# Patient Record
Sex: FEMALE | Race: ASIAN | HISPANIC OR LATINO | Employment: FULL TIME | URBAN - METROPOLITAN AREA
[De-identification: names, ages, dates, MRNs, and addresses within clinical notes are randomized per-mention and may not be internally consistent; named-entity substitution may affect disease eponyms.]

---

## 2017-06-11 ENCOUNTER — TRANSCRIBE ORDERS (OUTPATIENT)
Dept: LAB | Facility: HOSPITAL | Age: 72
End: 2017-06-11

## 2017-06-11 ENCOUNTER — APPOINTMENT (OUTPATIENT)
Dept: LAB | Facility: HOSPITAL | Age: 72
End: 2017-06-11
Payer: COMMERCIAL

## 2017-06-11 DIAGNOSIS — J45.909 UNCOMPLICATED ASTHMA: ICD-10-CM

## 2017-06-11 DIAGNOSIS — R73.01 IMPAIRED FASTING GLUCOSE: ICD-10-CM

## 2017-06-11 DIAGNOSIS — E78.5 HYPERLIPIDEMIA: ICD-10-CM

## 2017-06-11 DIAGNOSIS — K21.9 GASTRO-ESOPHAGEAL REFLUX DISEASE WITHOUT ESOPHAGITIS: ICD-10-CM

## 2017-06-11 DIAGNOSIS — M85.80 OTHER SPECIFIED DISORDERS OF BONE DENSITY AND STRUCTURE, UNSPECIFIED SITE: ICD-10-CM

## 2017-06-11 LAB
ALBUMIN SERPL BCP-MCNC: 3.6 G/DL (ref 3.5–5)
ALP SERPL-CCNC: 104 U/L (ref 46–116)
ALT SERPL W P-5'-P-CCNC: 26 U/L (ref 12–78)
ANION GAP SERPL CALCULATED.3IONS-SCNC: 5 MMOL/L (ref 4–13)
AST SERPL W P-5'-P-CCNC: 20 U/L (ref 5–45)
BILIRUB SERPL-MCNC: 0.45 MG/DL (ref 0.2–1)
BUN SERPL-MCNC: 17 MG/DL (ref 5–25)
CALCIUM SERPL-MCNC: 8.8 MG/DL (ref 8.3–10.1)
CHLORIDE SERPL-SCNC: 106 MMOL/L (ref 100–108)
CHOLEST SERPL-MCNC: 249 MG/DL (ref 50–200)
CO2 SERPL-SCNC: 30 MMOL/L (ref 21–32)
CREAT SERPL-MCNC: 0.73 MG/DL (ref 0.6–1.3)
EST. AVERAGE GLUCOSE BLD GHB EST-MCNC: 123 MG/DL
GFR SERPL CREATININE-BSD FRML MDRD: >60 ML/MIN/1.73SQ M
GLUCOSE P FAST SERPL-MCNC: 103 MG/DL (ref 65–99)
HBA1C MFR BLD: 5.9 % (ref 4.2–6.3)
HDLC SERPL-MCNC: 50 MG/DL (ref 40–60)
LDLC SERPL CALC-MCNC: 169 MG/DL (ref 0–100)
POTASSIUM SERPL-SCNC: 4.6 MMOL/L (ref 3.5–5.3)
PROT SERPL-MCNC: 7.5 G/DL (ref 6.4–8.2)
SODIUM SERPL-SCNC: 141 MMOL/L (ref 136–145)
TRIGL SERPL-MCNC: 148 MG/DL

## 2017-06-11 PROCEDURE — 80061 LIPID PANEL: CPT

## 2017-06-11 PROCEDURE — 83036 HEMOGLOBIN GLYCOSYLATED A1C: CPT

## 2017-06-11 PROCEDURE — 36415 COLL VENOUS BLD VENIPUNCTURE: CPT

## 2017-06-11 PROCEDURE — 80053 COMPREHEN METABOLIC PANEL: CPT

## 2017-06-12 ENCOUNTER — GENERIC CONVERSION - ENCOUNTER (OUTPATIENT)
Dept: OTHER | Facility: OTHER | Age: 72
End: 2017-06-12

## 2017-07-03 ENCOUNTER — ALLSCRIPTS OFFICE VISIT (OUTPATIENT)
Dept: OTHER | Facility: OTHER | Age: 72
End: 2017-07-03

## 2017-07-03 DIAGNOSIS — Z12.11 ENCOUNTER FOR SCREENING FOR MALIGNANT NEOPLASM OF COLON: ICD-10-CM

## 2017-07-03 DIAGNOSIS — M85.80 OTHER SPECIFIED DISORDERS OF BONE DENSITY AND STRUCTURE, UNSPECIFIED SITE: ICD-10-CM

## 2017-07-03 DIAGNOSIS — Z00.00 ENCOUNTER FOR GENERAL ADULT MEDICAL EXAMINATION WITHOUT ABNORMAL FINDINGS: ICD-10-CM

## 2017-07-03 DIAGNOSIS — K21.9 GASTRO-ESOPHAGEAL REFLUX DISEASE WITHOUT ESOPHAGITIS: ICD-10-CM

## 2017-07-03 DIAGNOSIS — E78.5 HYPERLIPIDEMIA: ICD-10-CM

## 2017-07-03 DIAGNOSIS — Z47.1 AFTERCARE FOLLOWING JOINT REPLACEMENT SURGERY: ICD-10-CM

## 2017-07-03 DIAGNOSIS — R73.01 IMPAIRED FASTING GLUCOSE: ICD-10-CM

## 2017-07-03 DIAGNOSIS — J45.909 UNCOMPLICATED ASTHMA: ICD-10-CM

## 2017-07-11 ENCOUNTER — GENERIC CONVERSION - ENCOUNTER (OUTPATIENT)
Dept: OTHER | Facility: OTHER | Age: 72
End: 2017-07-11

## 2017-07-17 ENCOUNTER — GENERIC CONVERSION - ENCOUNTER (OUTPATIENT)
Dept: OTHER | Facility: OTHER | Age: 72
End: 2017-07-17

## 2018-01-12 VITALS
TEMPERATURE: 98.3 F | WEIGHT: 136 LBS | SYSTOLIC BLOOD PRESSURE: 136 MMHG | BODY MASS INDEX: 25.03 KG/M2 | DIASTOLIC BLOOD PRESSURE: 72 MMHG | HEART RATE: 92 BPM | HEIGHT: 62 IN | OXYGEN SATURATION: 98 %

## 2018-01-12 NOTE — RESULT NOTES
Verified Results  (1) LIPID PANEL FASTING W DIRECT LDL REFLEX 11Jun2017 09:06AM Hermann Olguin Order Number: [id number]     Test Name Result Flag Reference   CHOLESTEROL 249 mg/dL H    LDL CHOLESTEROL CALCULATED 169 mg/dL H 0-100   - Patient Instructions: This is a fasting blood test  Water, black tea or black coffee only after 9:00pm the night before test   Drink 2 glasses of water the morning of test       Triglyceride:         Normal              <150 mg/dl       Borderline High    150-199 mg/dl       High               200-499 mg/dl       Very High          >499 mg/dl  Cholesterol:         Desirable        <200 mg/dl      Borderline High  200-239 mg/dl      High             >239 mg/dl  HDL Cholesterol:        High    >59 mg/dL      Low     <41 mg/dL  LDL Cholesterol:        Optimal          <100 mg/dl        Near Optimal     100-129 mg/dl        Above Optimal          Borderline High   130-159 mg/dl          High              160-189 mg/dl          Very High        >189 mg/dl  LDL CALCULATED:    This screening LDL is a calculated result  It does not have the accuracy of the Direct Measured LDL in the monitoring of patients with hyperlipidemia and/or statin therapy  Direct Measure LDL (LOL640) must be ordered separately in these patients  TRIGLYCERIDES 148 mg/dL  <=150   Specimen collection should occur prior to N-Acetylcysteine or Metamizole administration due to the potential for falsely depressed results  HDL,DIRECT 50 mg/dL  40-60   Specimen collection should occur prior to Metamizole administration due to the potential for falsely depressed results       (1) COMPREHENSIVE METABOLIC PANEL [id number] 31:79CO Hermann Olguin Order Number: [id number]     Test Name Result Flag Reference   SODIUM 141 mmol/L  136-145   POTASSIUM 4 6 mmol/L  3 5-5 3   CHLORIDE 106 mmol/L  100-108   CARBON DIOXIDE 30 mmol/L  21-32   ANION GAP (CALC) 5 mmol/L  4-13   BLOOD UREA NITROGEN 17 mg/dL  5-25   CREATININE 0 73 mg/dL  0 60-1 30   Standardized to IDMS reference method   CALCIUM 8 8 mg/dL  8 3-10 1   BILI, TOTAL 0 45 mg/dL  0 20-1 00   ALK PHOSPHATAS 104 U/L     ALT (SGPT) 26 U/L  12-78   AST(SGOT) 20 U/L  5-45   ALBUMIN 3 6 g/dL  3 5-5 0   TOTAL PROTEIN 7 5 g/dL  6 4-8 2   eGFR Non-African American      >60 0 ml/min/1 73sq [facility] Disease Education Program recommendations are as follows:  GFR calculation is accurate only with a steady state creatinine  Chronic Kidney disease less than 60 ml/min/1 73 sq  meters  Kidney failure less than 15 ml/min/1 73 sq  meters  GLUCOSE FASTING 103 mg/dL H 65-99     (1) HEMOGLOBIN A1C 11Jun2017 09:06AM Alex Diaz Order Number: [id number]     Test Name Result Flag Reference   HEMOGLOBIN A1C 5 9 %  4 2-6 3   EST  AVG   GLUCOSE 123 mg/dl         Signatures   Electronically signed by : PRABHU Calixto ; Jun 12 2017 11:18AM EST                       (Author)

## 2018-01-31 ENCOUNTER — OFFICE VISIT (OUTPATIENT)
Dept: FAMILY MEDICINE CLINIC | Facility: CLINIC | Age: 73
End: 2018-01-31
Payer: COMMERCIAL

## 2018-01-31 VITALS
HEART RATE: 94 BPM | WEIGHT: 138.8 LBS | BODY MASS INDEX: 26.21 KG/M2 | DIASTOLIC BLOOD PRESSURE: 82 MMHG | OXYGEN SATURATION: 97 % | RESPIRATION RATE: 16 BRPM | HEIGHT: 61 IN | TEMPERATURE: 98.3 F | SYSTOLIC BLOOD PRESSURE: 120 MMHG

## 2018-01-31 DIAGNOSIS — J45.901 EXACERBATION OF ASTHMA, UNSPECIFIED ASTHMA SEVERITY, UNSPECIFIED WHETHER PERSISTENT: Primary | ICD-10-CM

## 2018-01-31 DIAGNOSIS — J45.991 COUGH VARIANT ASTHMA: ICD-10-CM

## 2018-01-31 PROCEDURE — 99213 OFFICE O/P EST LOW 20 MIN: CPT | Performed by: FAMILY MEDICINE

## 2018-01-31 RX ORDER — ALBUTEROL SULFATE 90 UG/1
2 AEROSOL, METERED RESPIRATORY (INHALATION)
COMMUNITY
Start: 2011-12-21 | End: 2020-07-07 | Stop reason: SDUPTHER

## 2018-01-31 RX ORDER — PRAVASTATIN SODIUM 20 MG
1 TABLET ORAL
COMMUNITY
Start: 2011-08-04 | End: 2018-02-15 | Stop reason: SDUPTHER

## 2018-01-31 RX ORDER — ASCORBIC ACID 250 MG
TABLET,CHEWABLE ORAL
COMMUNITY
Start: 2014-07-29 | End: 2021-12-13

## 2018-01-31 RX ORDER — BROMPHENIRAMINE MALEATE, PSEUDOEPHEDRINE HYDROCHLORIDE, AND DEXTROMETHORPHAN HYDROBROMIDE 2; 30; 10 MG/5ML; MG/5ML; MG/5ML
5 SYRUP ORAL 3 TIMES DAILY PRN
Qty: 120 ML | Refills: 0 | Status: SHIPPED | OUTPATIENT
Start: 2018-01-31 | End: 2018-07-30 | Stop reason: ALTCHOICE

## 2018-01-31 RX ORDER — ASPIRIN 81 MG/1
TABLET, CHEWABLE ORAL
COMMUNITY
End: 2021-12-01

## 2018-01-31 RX ORDER — CALCIUM CARBONATE 500(1250)
1 TABLET ORAL DAILY
COMMUNITY
Start: 2016-04-18

## 2018-01-31 RX ORDER — BENZONATATE 100 MG/1
100 CAPSULE ORAL 3 TIMES DAILY PRN
Qty: 20 CAPSULE | Refills: 0 | Status: SHIPPED | OUTPATIENT
Start: 2018-01-31 | End: 2018-07-30 | Stop reason: ALTCHOICE

## 2018-01-31 RX ORDER — CHLORAL HYDRATE 500 MG
CAPSULE ORAL
COMMUNITY

## 2018-01-31 RX ORDER — METHYLPREDNISOLONE ACETATE 40 MG/ML
40 INJECTION, SUSPENSION INTRA-ARTICULAR; INTRALESIONAL; INTRAMUSCULAR; SOFT TISSUE ONCE
Status: COMPLETED | OUTPATIENT
Start: 2018-01-31 | End: 2018-01-31

## 2018-01-31 RX ORDER — OMEPRAZOLE 20 MG/1
1 CAPSULE, DELAYED RELEASE ORAL
COMMUNITY
Start: 2016-04-18 | End: 2018-02-15 | Stop reason: SDUPTHER

## 2018-01-31 RX ADMIN — METHYLPREDNISOLONE ACETATE 40 MG: 40 INJECTION, SUSPENSION INTRA-ARTICULAR; INTRALESIONAL; INTRAMUSCULAR; SOFT TISSUE at 11:48

## 2018-01-31 NOTE — PATIENT INSTRUCTIONS
Asma   CUIDADO AMBULATORIO:   Asma  es kristofer enfermedad pulmonar que dificulta la respiración  La inflamación crónica y las reacciones a los desencadenantes estrechan las vías respiratorias en los pulmones  El asma puede ser de peligro mortal si no se mantiene bajo control  El asma con tos variante  es un tipo de asma que causa kristofer tos seca que reaparece continuamente  La tos seca podría ser apodaca único síntoma o es posible que también sienta opresión en el pecho  Estos síntomas podrían ser provocados por el ejercicio o por la exposición a olores, alérgenos o infecciones del tracto respiratorio  El asma con tos variante se trata de la misma manera que el asma típico    Los síntomas más comunes incluyen los siguientes:   · Toser     · Sibilancias     · Dificultad para respirar     · Opresión en el pecho  Busque atención médica de inmediato si:   · Usted tiene falta de aliento severa  · Lyndsey labios y [place] se ponen azules o grises  · La piel alrededor de apodaca temi y costillas se hunde con cada respiración  · Usted tiene falta de [place], incluso después de gina apodaca medicamento a corto plazo según lo indicado  · Las lecturas numéricas del medidor de apodaca flujo astrid están en la leonardo jerri de apodaca plan de acción para el asma  Pregúntele a apodaca Keiry Kam vitaminas y minerales son adecuados para usted  · A usted se le acaba el medicamento antes de la fecha de apodaca próximo abastecimiento  · Lyndsey síntomas empeoran  · Usted necesita gina más medicamento que lo acostumbrado para controlar lyndsey síntomas  · Usted tiene preguntas o inquietudes acerca de apodaca condición o cuidado  El tratamiento para el asma  dependerá de la gravedad del asma  Es posible que los medicamentos disminuyan la inflamación, thony las vías respiratorias y faciliten apodaca respiración  Los medicamentos se pueden inhalar, gina en forma de píldora o ser inyectados  Los medicamentos a corto plazo alivian lyndsey síntomas con Cecil Pica   Los medicamentos a deonte plazo sirven para evitar ataques en un futuro  Es posible que además necesite medicamento para ayudar a controlar las alergias  Controle y evite ataques futuros de asma:   · Siga apodaca plan de acción para el asma  [place] es un plan por escrito que usted y apodaca médico wells creado  Explica cuáles medicamentos necesita usted y cuándo cambiar las dosis si fuera necesario  Además explica brent usted puede monitorear paul síntomas y usar un medidor del flujo astrid  El medidor mide qué tan rodney están funcionando los pulmones  · Controle otras afecciones de [place] , brent las [place], el reflujo estomacal y la apnea de sueño  · Identifique y evite factores desencadenantes  Estos podrían [employer], los ácaros del [facility], el moho y las cucarachas  · No fume o esté alrededor de personas que fuman  La nicotina y otras sustancias químicas que contienen los cigarrillos y cigarros pueden dañar los pulmones  Pida información a apodaca médico si usted actualmente fuma y necesita ayuda para dejar de fumar  Los cigarrillos electrónicos o tabaco sin humo todavía contienen nicotina  Consulte con apodaca médico antes de [employer]  · Pregunte sobre la vacuna contra la gripe  La gripe puede empeorar apodaca asma  Puede que usted necesite recibir kristofer vacuna anual contra la gripe  Acuda a paul consultas de control con apodaca médico según le indicaron  Usted necesitará regresar para asegurarse que apodaca medicamento está funcionando y paul síntomas están bajo control  Es posible que lo Nayeli Crate a un especialista del asma o de las [place]  A usted podrían pedirle que [address] [place] [address] Street un registro de los valores de apodaca flujo astrid y que lo traiga a paul citas  Anote paul preguntas para que se acuerde de hacerlas edwige paul visitas  © 2017 [address] Information is for End User's use only and may not be sold, redistributed or otherwise used for commercial purposes   All illustrations and images included in [facility] are the copyrighted property of A D A M , Inc  or [address]  Esta información es sólo para uso en educación  Apodaca intención no es darle un consejo médico sobre enfermedades o tratamientos  Colsulte con apodaca Michelle Donn farmacéutico antes de seguir cualquier régimen médico para saber si es seguro y efectivo para usted

## 2018-01-31 NOTE — PROGRESS NOTES
Assessment/Plan:    No problem-specific Assessment & Plan notes found for this encounter  Diagnoses and all orders for this visit:    Exacerbation of asthma, unspecified asthma severity, unspecified whether persistent  -     methylPREDNISolone acetate (DEPO-MEDROL) injection 40 mg; Inject 1 mL (40 mg total) into the shoulder, thigh, or buttocks once   -     brompheniramine-pseudoephedrine-DM 30-2-10 MG/5ML syrup; Take 5 mL by mouth 3 (three) times a day as needed for congestion or cough  -     benzonatate (TESSALON PERLES) 100 mg capsule; Take 1 capsule (100 mg total) by mouth 3 (three) times a day as needed for cough    Cough variant asthma  -     brompheniramine-pseudoephedrine-DM 30-2-10 MG/5ML syrup; Take 5 mL by mouth 3 (three) times a day as needed for congestion or cough  -     benzonatate (TESSALON PERLES) 100 mg capsule; Take 1 capsule (100 mg total) by mouth 3 (three) times a day as needed for cough    Other orders  -     aspirin 81 mg chewable tablet; Chew  -     Calcium 500 MG tablet; Take 1 tablet by mouth daily  -     Fluticasone Propionate, Inhal, (FLOVENT DISKUS) 250 MCG/BLIST AEPB; Inhale 2 (two) times a day  -     Omega-3 1000 MG CAPS; Take by mouth  -     omeprazole (PriLOSEC) 20 mg delayed release capsule; Take 1 capsule by mouth  -     pravastatin (PRAVACHOL) 20 mg tablet; Take 1 tablet by mouth  -     albuterol (VENTOLIN HFA) 90 mcg/act inhaler; Inhale 2 puffs  -     Ascorbic Acid (VITAMIN C) 250 MG CHEW; Chew  -     cholecalciferol (VITAMIN D3) 1,000 units tablet; Take 1 tablet by mouth daily        Subjective: Patient complain of cough with phlegm since Sunday  Patient says when she coughs she has pain in her ribs  Pain level 6/10     Patient ID: Porter Linares is a 71 y o  female  Since Sunday 3 days ago she started noticing a cough, she started taking mucinex  She went to work on Monday but left early bc she continue with cough and feelign tired   She continue taking mucinex and teas but no improvement  She did not get the flu shot this year  No fevers, no chills  No SOB  Nighttime wheezing  Using her usual flovent BID  But not using ventolin bc she didn't know when to using  Cough   This is a new problem  The current episode started in the past 7 days  Associated symptoms include nasal congestion and rhinorrhea  Pertinent negatives include no chest pain, chills, ear congestion, fever, sore throat, shortness of breath or sweats  Nothing aggravates the symptoms  She has tried OTC cough suppressant for the symptoms  The treatment provided no relief  Her past medical history is significant for asthma and environmental allergies  The following portions of the patient's history were reviewed and updated as appropriate: allergies, current medications, past family history, past medical history, past social history, past surgical history and problem list     Review of Systems   Constitutional: Negative for chills and fever  HENT: Positive for rhinorrhea  Negative for sore throat  Respiratory: Positive for cough  Negative for shortness of breath  Cardiovascular: Negative for chest pain  Allergic/Immunologic: Positive for environmental allergies  Objective:  /82   Pulse 94   Temp 98 3 °F (36 8 °C)   Resp 16   Ht 5' 1 42" (1 56 m)   Wt 63 kg (138 lb 12 8 oz)   LMP  (LMP Unknown)   SpO2 97%   Breastfeeding? No   BMI 25 87 kg/m²      Physical Exam   Constitutional: She is oriented to person, place, and time  She appears well-developed and well-nourished  HENT:   Head: Normocephalic  Right Ear: External ear normal    Left Ear: External ear normal    Mouth/Throat: Oropharynx is clear and moist    Eyes: Conjunctivae are normal    Cardiovascular: Normal rate, regular rhythm and normal heart sounds  Pulmonary/Chest: Effort normal and breath sounds normal  No respiratory distress  She has no wheezes  She has no rales     Neurological: She is alert and oriented to person, place, and time  Psychiatric: She has a normal mood and affect   Her behavior is normal

## 2018-02-04 ENCOUNTER — APPOINTMENT (OUTPATIENT)
Dept: LAB | Facility: HOSPITAL | Age: 73
End: 2018-02-04
Payer: COMMERCIAL

## 2018-02-04 DIAGNOSIS — Z00.00 ENCOUNTER FOR GENERAL ADULT MEDICAL EXAMINATION WITHOUT ABNORMAL FINDINGS: ICD-10-CM

## 2018-02-04 DIAGNOSIS — Z47.1 AFTERCARE FOLLOWING JOINT REPLACEMENT SURGERY: ICD-10-CM

## 2018-02-04 DIAGNOSIS — E78.5 HYPERLIPIDEMIA: ICD-10-CM

## 2018-02-04 DIAGNOSIS — Z12.11 ENCOUNTER FOR SCREENING FOR MALIGNANT NEOPLASM OF COLON: ICD-10-CM

## 2018-02-04 DIAGNOSIS — M85.80 OTHER SPECIFIED DISORDERS OF BONE DENSITY AND STRUCTURE: ICD-10-CM

## 2018-02-04 DIAGNOSIS — K21.9 GASTRO-ESOPHAGEAL REFLUX DISEASE WITHOUT ESOPHAGITIS: ICD-10-CM

## 2018-02-04 DIAGNOSIS — R73.01 IMPAIRED FASTING GLUCOSE: ICD-10-CM

## 2018-02-04 DIAGNOSIS — J45.909 UNCOMPLICATED ASTHMA: ICD-10-CM

## 2018-02-04 LAB
ALBUMIN SERPL BCP-MCNC: 3.7 G/DL (ref 3.5–5)
ALP SERPL-CCNC: 122 U/L (ref 46–116)
ALT SERPL W P-5'-P-CCNC: 56 U/L (ref 12–78)
ANION GAP SERPL CALCULATED.3IONS-SCNC: 8 MMOL/L (ref 4–13)
AST SERPL W P-5'-P-CCNC: 39 U/L (ref 5–45)
BILIRUB SERPL-MCNC: 0.47 MG/DL (ref 0.2–1)
BUN SERPL-MCNC: 17 MG/DL (ref 5–25)
CALCIUM SERPL-MCNC: 8.6 MG/DL (ref 8.3–10.1)
CHLORIDE SERPL-SCNC: 104 MMOL/L (ref 100–108)
CHOLEST SERPL-MCNC: 210 MG/DL (ref 50–200)
CO2 SERPL-SCNC: 26 MMOL/L (ref 21–32)
CREAT SERPL-MCNC: 0.76 MG/DL (ref 0.6–1.3)
GFR SERPL CREATININE-BSD FRML MDRD: 79 ML/MIN/1.73SQ M
GLUCOSE P FAST SERPL-MCNC: 95 MG/DL (ref 65–99)
HDLC SERPL-MCNC: 45 MG/DL (ref 40–60)
LDLC SERPL CALC-MCNC: 130 MG/DL (ref 0–100)
POTASSIUM SERPL-SCNC: 4.2 MMOL/L (ref 3.5–5.3)
PROT SERPL-MCNC: 8.2 G/DL (ref 6.4–8.2)
SODIUM SERPL-SCNC: 138 MMOL/L (ref 136–145)
TRIGL SERPL-MCNC: 175 MG/DL

## 2018-02-04 PROCEDURE — 80053 COMPREHEN METABOLIC PANEL: CPT

## 2018-02-04 PROCEDURE — 36415 COLL VENOUS BLD VENIPUNCTURE: CPT

## 2018-02-04 PROCEDURE — 80061 LIPID PANEL: CPT

## 2018-02-15 ENCOUNTER — OFFICE VISIT (OUTPATIENT)
Dept: FAMILY MEDICINE CLINIC | Facility: CLINIC | Age: 73
End: 2018-02-15
Payer: COMMERCIAL

## 2018-02-15 VITALS
OXYGEN SATURATION: 97 % | HEIGHT: 61 IN | BODY MASS INDEX: 25.57 KG/M2 | TEMPERATURE: 98.1 F | RESPIRATION RATE: 18 BRPM | WEIGHT: 135.4 LBS | HEART RATE: 96 BPM | SYSTOLIC BLOOD PRESSURE: 140 MMHG | DIASTOLIC BLOOD PRESSURE: 70 MMHG

## 2018-02-15 DIAGNOSIS — K21.9 GASTROESOPHAGEAL REFLUX DISEASE, ESOPHAGITIS PRESENCE NOT SPECIFIED: ICD-10-CM

## 2018-02-15 DIAGNOSIS — R73.01 IFG (IMPAIRED FASTING GLUCOSE): Primary | ICD-10-CM

## 2018-02-15 DIAGNOSIS — E78.49 OTHER HYPERLIPIDEMIA: ICD-10-CM

## 2018-02-15 DIAGNOSIS — Z23 INFLUENZA VACCINE NEEDED: ICD-10-CM

## 2018-02-15 DIAGNOSIS — M85.80 OSTEOPENIA, UNSPECIFIED LOCATION: ICD-10-CM

## 2018-02-15 PROBLEM — J45.991 COUGH VARIANT ASTHMA: Status: RESOLVED | Noted: 2018-01-31 | Resolved: 2018-02-15

## 2018-02-15 PROCEDURE — 1036F TOBACCO NON-USER: CPT | Performed by: FAMILY MEDICINE

## 2018-02-15 PROCEDURE — 90471 IMMUNIZATION ADMIN: CPT | Performed by: FAMILY MEDICINE

## 2018-02-15 PROCEDURE — 99214 OFFICE O/P EST MOD 30 MIN: CPT | Performed by: FAMILY MEDICINE

## 2018-02-15 PROCEDURE — 90662 IIV NO PRSV INCREASED AG IM: CPT | Performed by: FAMILY MEDICINE

## 2018-02-15 RX ORDER — PRAVASTATIN SODIUM 20 MG
20 TABLET ORAL DAILY
Qty: 90 TABLET | Refills: 1 | Status: SHIPPED | OUTPATIENT
Start: 2018-02-15 | End: 2018-08-20 | Stop reason: SDUPTHER

## 2018-02-15 RX ORDER — OMEPRAZOLE 20 MG/1
20 CAPSULE, DELAYED RELEASE ORAL EVERY OTHER DAY
Qty: 45 CAPSULE | Refills: 1 | Status: SHIPPED | OUTPATIENT
Start: 2018-02-15 | End: 2018-08-20 | Stop reason: SDUPTHER

## 2018-02-15 NOTE — PATIENT INSTRUCTIONS
Enfermedad por reflujo gastroesofágico   CUIDADO AMBULATORIO:   La enfermedad por reflujo gastroesofágico  (Javier Brash) ocurre cuando el ácido y los alimentos en el estómago regresan al esófago  La enfermedad por reflujo gastroesofágico es el reflujo que se produce más de [address] a la semana edwige varias semanas  Generalmente causa acidez y otros síntomas  La ERGE puede causar otros problemas de osmin con el tiempo si no es tratada  Los síntomas comunes incluyen:  La acidez es el síntoma más común de la Javier Brash  Usted podría sentir dolor quemante en el pecho o debajo del esternón  Silvana ocurre generalmente después de las comidas y se extiende al temi, a la mandíbula o al hombro  El dolor se renetta cuando cambia de posición  Usted también podría presentar alguno de los siguientes:  · Sabor amargo o ácido en apodaca boca    · Tos seca    · Dificultad para tragar o dolor al tragar    · Ronquera o dolor de garganta    · Eructar o tener hipo con frecuencia    · Sensación de llenura pronto después de empezar a comer  Busque atención médica de inmediato si:  · Usted siente Ronald Cobian y no puede eructar o vomitar  · Usted siente dolor ne en el pecho y dificultad repentina para respirar  · Lyndsey evacuaciones intestinales son de color jas, con [place], o de apariencia alquitranada  · Apodaca vómito parece brent café molido o contiene cheo  Pregúntele a apodaca Santiago Banker vitaminas y minerales son adecuados para usted  · Vomita grandes cantidades, o vomita con frecuencia  · Tiene dificultad para respirar después de vomitar  · Tiene dificultad para tragar o dolor al tragar  · Usted pierde peso sin proponérselo  · Lyndsey síntomas empeoran o no mejoran con el tratamiento  · Usted tiene preguntas o inquietudes acerca de apodaca condición o cuidado    El tratamiento para la ERGE:  Es probable que apodaca médico le recete medicamentos para disminuir el ácido estomacal  También podría recetarle medicamentos que [facility] a apodaca esófago y estómago a  los alimentos y líquidos a paul intestinos  Se podría requerir de [place] de que otros tratamientos no hayan funcionado  Usted también podría necesitar de cirugía para envolver la parte superior del estómago alrededor del esfínter esofágico  [place] fortalecerá el esfínter y prevendrá el reflujo  Control de la ERGE:   · No consuma alimentos o bebidas que puedan aumentar la [place]  Estos incluyen chocolate, menta, comidas fritas o grasosas, bebidas que contienen cafeína o bebidas gaseosas  Otros alimentos incluyen comidas picantes, cebollas, tomates y alimentos a base de tomate  No consuma alimentos y bebidas que puedan irritar apodaca esófago, brent las frutas cítricas, los jugos y las bebidas alcohólicas  · No ingiera comidas abundantes  Cuando usted come [employer] a la vez, apodaca estómago necesita más ácido para digerirla  Consuma 6 comidas pequeñas al día en vez de 3 comidas grandes y coma lentamente  No consuma alimentos entre 2 y 3 horas antes de [place]  · Eleve la cabecera de apodaca cama  Coloque bloques de 6 pulgadas debajo de la cabecera de la estructura de apodaca cama  También podría usar más kristofer almohada para apoyar apodaca chrystal y hombros mientras duerme  · Mantenga un peso saludable  Si usted tiene sobrepeso, la pérdida de peso podría ayudar a aliviar los síntomas de la [id number]  · No fume  Fumar debilita el esfínter esofágico inferior y [place] el riesgo de [place]  Pida información a apodaca médico si usted actualmente fuma y necesita ayuda para dejar de fumar  Los cigarrillos electrónicos o tabaco sin humo todavía contienen nicotina  Consulte con apodaca médico antes de [employer]  · No use ropa que [place] alrededor de la cintura  La ropa apretada puede ejercer presión [employer] y causar o empeorar los síntomas de la [place]  Acuda a paul consultas de control con apodaca médico según le indicaron    Anote paul preguntas para que se acuerde de hacerlas edwige paul visitas  © 2017 [address]  Information is for End User's use only and may not be sold, redistributed or otherwise used for commercial purposes  All illustrations and images included in CareNotes® are the copyrighted property of A D A M , Inc  or Keshawn Linconl  Esta información es sólo para uso en educación  Apodaca intención no es darle un consejo médico sobre enfermedades o tratamientos  Colsulte con apodaca Judith Bjornstad farmacéutico antes de seguir cualquier régimen médico para saber si es seguro y efectivo para usted

## 2018-02-15 NOTE — PROGRESS NOTES
Assessment/Plan:    No problem-specific Assessment & Plan notes found for this encounter  Diagnoses and all orders for this visit:    IFG (impaired fasting glucose)    Other hyperlipidemia  -     pravastatin (PRAVACHOL) 20 mg tablet; Take 1 tablet (20 mg total) by mouth daily    Osteopenia, unspecified location    Gastroesophageal reflux disease, esophagitis presence not specified  -     omeprazole (PriLOSEC) 20 mg delayed release capsule; Take 1 capsule (20 mg total) by mouth every other day    Influenza vaccine needed  -     Flu Vaccine High Dose Split Preservative Free IM      Follow up 6mo for awv  Subjective: Patient is here for a 6 month follow up with labs   Patient refused the flu vac   Patient given the PHQ 9 today-0     Patient ID: Phillip Chavarria is a 71 y o  female  HPI  Pt presents for follow up chronic and labs  Labs reviewed and discussed  Stable    IFG - A1c 5 9, cont with lifestyle mod  HLD- on pravastatin and omega 3  Reflux- on omeprazole 20mg qd, stable  The following portions of the patient's history were reviewed and updated as appropriate: allergies, current medications, past family history, past medical history, past social history, past surgical history and problem list     Review of Systems   Constitutional: Negative for activity change and appetite change  Respiratory: Negative  Cardiovascular: Negative  Gastrointestinal: Negative  Genitourinary: Negative  Musculoskeletal: Negative for arthralgias  Skin: Negative for rash  Psychiatric/Behavioral: Negative  Objective:    Vitals:    02/15/18 1413   BP: 140/70   Pulse: 96   Resp: 18   Temp: 98 1 °F (36 7 °C)   SpO2: 97%        Physical Exam   Constitutional: She is oriented to person, place, and time  She appears well-developed and well-nourished  HENT:   Head: Normocephalic  Eyes: Conjunctivae are normal    Cardiovascular: Normal rate, regular rhythm and normal heart sounds      Pulmonary/Chest: Effort normal and breath sounds normal  No respiratory distress  She has no wheezes  She has no rales  Abdominal: Soft  Bowel sounds are normal  She exhibits no distension  There is no tenderness  Neurological: She is alert and oriented to person, place, and time  Psychiatric: She has a normal mood and affect   Her behavior is normal

## 2018-06-21 DIAGNOSIS — J45.909 UNCOMPLICATED ASTHMA, UNSPECIFIED ASTHMA SEVERITY, UNSPECIFIED WHETHER PERSISTENT: Primary | ICD-10-CM

## 2018-06-21 RX ORDER — FLUTICASONE PROPIONATE 250 UG/1
POWDER, METERED RESPIRATORY (INHALATION)
Qty: 1 EACH | Refills: 5 | Status: SHIPPED | OUTPATIENT
Start: 2018-06-21 | End: 2019-02-14 | Stop reason: SDUPTHER

## 2018-07-27 NOTE — PROGRESS NOTES
Assessment/Plan:    No problem-specific Assessment & Plan notes found for this encounter  Diagnoses and all orders for this visit:    Osteopenia, unspecified location  -     DXA body comp analysis; Future  -     Lipid Panel with Direct LDL reflex; Future  -     Comprehensive metabolic panel; Future  -     CBC and differential; Future  -     TSH, 3rd generation with T4 reflex; Future    IFG (impaired fasting glucose)  -     Lipid Panel with Direct LDL reflex; Future  -     Comprehensive metabolic panel; Future  -     CBC and differential; Future  -     HEMOGLOBIN A1C W/ EAG ESTIMATION; Future  -     TSH, 3rd generation with T4 reflex; Future    Gastroesophageal reflux disease, esophagitis presence not specified  -     Lipid Panel with Direct LDL reflex; Future  -     Comprehensive metabolic panel; Future  -     CBC and differential; Future  -     TSH, 3rd generation with T4 reflex; Future    Other hyperlipidemia  -     Lipid Panel with Direct LDL reflex; Future  -     Comprehensive metabolic panel; Future  -     CBC and differential; Future  -     TSH, 3rd generation with T4 reflex; Future    Neck pain  -     meloxicam (MOBIC) 7 5 mg tablet; Take 1 tablet (7 5 mg total) by mouth daily  -     Lipid Panel with Direct LDL reflex; Future  -     Comprehensive metabolic panel; Future  -     CBC and differential; Future  -     TSH, 3rd generation with T4 reflex; Future        Follow up 6 month  Subjective:   Pt here for 6 month follow up visit  No labs ordered at last visit  Pt complaining of pain in shoulder and back for about 6 months worse in the morning  Pt complaining of some eye swelling in the morning only  Pt asking for medication for her arthritis     Patient ID: Godfrey Joiner is a 68 y o  female  HPI     Patient presents for chronic condition follow-up  His last labs were in February 2018 and stable  Patient is due for a DEXA scan last 1 was in 2005    Osteopenia- she is currently taking calcium and vitamin D      HLD- pravastatin 20mg qhs  GERD- omeprazole PRN, stable  Asthma controlled on flovent bid  She complains of couple months of upper back, left neck pain  She has tried her daughter's meloxicam and helped  She otherwise is doing well, she still works  She is active  She refuses colonoscopy, discussed risk and benefits, she understand  Prefers cologard, has been already order, needs to do sample  The following portions of the patient's history were reviewed and updated as appropriate: allergies, current medications, past family history, past medical history, past social history, past surgical history and problem list     Review of Systems   Constitutional: Negative for activity change and appetite change  Respiratory: Negative  Cardiovascular: Negative  Gastrointestinal: Negative  Genitourinary: Negative  Musculoskeletal: Positive for arthralgias  Skin: Negative for rash  Psychiatric/Behavioral: Negative  Objective:      /68   Pulse 92   Temp 98 7 °F (37 1 °C)   Ht 5' 1 61" (1 565 m)   Wt 62 6 kg (138 lb)   LMP  (LMP Unknown)   SpO2 98%   BMI 25 56 kg/m²          Physical Exam   Constitutional: She is oriented to person, place, and time  She appears well-developed and well-nourished  No distress  HENT:   Head: Normocephalic  Eyes: Conjunctivae are normal    Cardiovascular: Normal rate, regular rhythm and normal heart sounds  Pulmonary/Chest: Effort normal and breath sounds normal  No respiratory distress  She has no wheezes  She has no rales  Musculoskeletal: Normal range of motion  She exhibits no edema  Neurological: She is alert and oriented to person, place, and time  Psychiatric: She has a normal mood and affect   Her behavior is normal

## 2018-07-30 ENCOUNTER — OFFICE VISIT (OUTPATIENT)
Dept: FAMILY MEDICINE CLINIC | Facility: CLINIC | Age: 73
End: 2018-07-30
Payer: COMMERCIAL

## 2018-07-30 VITALS
WEIGHT: 138 LBS | HEIGHT: 62 IN | SYSTOLIC BLOOD PRESSURE: 140 MMHG | OXYGEN SATURATION: 98 % | TEMPERATURE: 98.7 F | HEART RATE: 92 BPM | DIASTOLIC BLOOD PRESSURE: 68 MMHG | BODY MASS INDEX: 25.4 KG/M2

## 2018-07-30 DIAGNOSIS — E78.49 OTHER HYPERLIPIDEMIA: ICD-10-CM

## 2018-07-30 DIAGNOSIS — M85.80 OSTEOPENIA, UNSPECIFIED LOCATION: Primary | ICD-10-CM

## 2018-07-30 DIAGNOSIS — K21.9 GASTROESOPHAGEAL REFLUX DISEASE, ESOPHAGITIS PRESENCE NOT SPECIFIED: ICD-10-CM

## 2018-07-30 DIAGNOSIS — R73.01 IFG (IMPAIRED FASTING GLUCOSE): ICD-10-CM

## 2018-07-30 DIAGNOSIS — M54.2 NECK PAIN: ICD-10-CM

## 2018-07-30 PROCEDURE — 1160F RVW MEDS BY RX/DR IN RCRD: CPT | Performed by: FAMILY MEDICINE

## 2018-07-30 PROCEDURE — 3008F BODY MASS INDEX DOCD: CPT | Performed by: FAMILY MEDICINE

## 2018-07-30 PROCEDURE — 99214 OFFICE O/P EST MOD 30 MIN: CPT | Performed by: FAMILY MEDICINE

## 2018-07-30 RX ORDER — MELOXICAM 7.5 MG/1
7.5 TABLET ORAL DAILY
Qty: 30 TABLET | Refills: 1 | Status: SHIPPED | OUTPATIENT
Start: 2018-07-30 | End: 2018-11-02 | Stop reason: SDUPTHER

## 2018-08-02 DIAGNOSIS — Z12.11 SCREENING FOR MALIGNANT NEOPLASM OF COLON: Primary | ICD-10-CM

## 2018-08-20 DIAGNOSIS — E78.49 OTHER HYPERLIPIDEMIA: ICD-10-CM

## 2018-08-20 DIAGNOSIS — K21.9 GASTROESOPHAGEAL REFLUX DISEASE, ESOPHAGITIS PRESENCE NOT SPECIFIED: ICD-10-CM

## 2018-08-20 RX ORDER — PRAVASTATIN SODIUM 20 MG
20 TABLET ORAL DAILY
Qty: 90 TABLET | Refills: 1 | Status: SHIPPED | OUTPATIENT
Start: 2018-08-20 | End: 2019-05-20 | Stop reason: HOSPADM

## 2018-08-20 RX ORDER — OMEPRAZOLE 20 MG/1
20 CAPSULE, DELAYED RELEASE ORAL EVERY OTHER DAY
Qty: 45 CAPSULE | Refills: 1 | Status: SHIPPED | OUTPATIENT
Start: 2018-08-20 | End: 2019-05-20 | Stop reason: SDUPTHER

## 2018-11-02 DIAGNOSIS — M54.2 NECK PAIN: ICD-10-CM

## 2018-11-02 RX ORDER — MELOXICAM 7.5 MG/1
TABLET ORAL
Qty: 30 TABLET | Refills: 1 | Status: SHIPPED | OUTPATIENT
Start: 2018-11-02 | End: 2019-02-14 | Stop reason: SDUPTHER

## 2019-02-14 DIAGNOSIS — M54.2 NECK PAIN: ICD-10-CM

## 2019-02-14 DIAGNOSIS — J45.909 UNCOMPLICATED ASTHMA, UNSPECIFIED ASTHMA SEVERITY, UNSPECIFIED WHETHER PERSISTENT: ICD-10-CM

## 2019-02-14 RX ORDER — FLUTICASONE PROPIONATE 250 UG/1
POWDER, METERED RESPIRATORY (INHALATION)
Qty: 1 EACH | Refills: 5 | Status: SHIPPED | OUTPATIENT
Start: 2019-02-14 | End: 2019-05-20 | Stop reason: SDUPTHER

## 2019-02-14 RX ORDER — MELOXICAM 7.5 MG/1
7.5 TABLET ORAL DAILY
Qty: 30 TABLET | Refills: 0 | Status: SHIPPED | OUTPATIENT
Start: 2019-02-14 | End: 2019-04-18 | Stop reason: ALTCHOICE

## 2019-04-18 ENCOUNTER — OFFICE VISIT (OUTPATIENT)
Dept: FAMILY MEDICINE CLINIC | Facility: CLINIC | Age: 74
End: 2019-04-18
Payer: COMMERCIAL

## 2019-04-18 VITALS
WEIGHT: 135.4 LBS | SYSTOLIC BLOOD PRESSURE: 160 MMHG | HEIGHT: 61 IN | HEART RATE: 98 BPM | DIASTOLIC BLOOD PRESSURE: 90 MMHG | OXYGEN SATURATION: 98 % | TEMPERATURE: 98 F | BODY MASS INDEX: 25.57 KG/M2 | RESPIRATION RATE: 16 BRPM

## 2019-04-18 DIAGNOSIS — Z23 NEED FOR IMMUNIZATION AGAINST INFLUENZA: ICD-10-CM

## 2019-04-18 DIAGNOSIS — Z12.11 SCREENING FOR COLON CANCER: ICD-10-CM

## 2019-04-18 DIAGNOSIS — M19.042 PRIMARY OSTEOARTHRITIS OF BOTH HANDS: ICD-10-CM

## 2019-04-18 DIAGNOSIS — Z11.59 ENCOUNTER FOR HEPATITIS C SCREENING TEST FOR LOW RISK PATIENT: ICD-10-CM

## 2019-04-18 DIAGNOSIS — M85.80 OSTEOPENIA, UNSPECIFIED LOCATION: ICD-10-CM

## 2019-04-18 DIAGNOSIS — M19.041 PRIMARY OSTEOARTHRITIS OF BOTH HANDS: ICD-10-CM

## 2019-04-18 DIAGNOSIS — R73.01 IFG (IMPAIRED FASTING GLUCOSE): ICD-10-CM

## 2019-04-18 DIAGNOSIS — Z12.39 SCREENING FOR BREAST CANCER: ICD-10-CM

## 2019-04-18 DIAGNOSIS — Z23 NEED FOR VACCINATION AGAINST STREPTOCOCCUS PNEUMONIAE: ICD-10-CM

## 2019-04-18 DIAGNOSIS — E78.2 MIXED HYPERLIPIDEMIA: Primary | ICD-10-CM

## 2019-04-18 PROCEDURE — 90670 PCV13 VACCINE IM: CPT

## 2019-04-18 PROCEDURE — 99214 OFFICE O/P EST MOD 30 MIN: CPT | Performed by: FAMILY MEDICINE

## 2019-04-18 PROCEDURE — 90472 IMMUNIZATION ADMIN EACH ADD: CPT

## 2019-04-18 PROCEDURE — 90662 IIV NO PRSV INCREASED AG IM: CPT

## 2019-04-18 PROCEDURE — 90471 IMMUNIZATION ADMIN: CPT

## 2019-04-18 PROCEDURE — 1101F PT FALLS ASSESS-DOCD LE1/YR: CPT | Performed by: FAMILY MEDICINE

## 2019-04-18 RX ORDER — NAPROXEN 375 MG/1
375 TABLET ORAL 2 TIMES DAILY WITH MEALS
Qty: 60 TABLET | Refills: 0 | Status: SHIPPED | OUTPATIENT
Start: 2019-04-18 | End: 2019-05-20 | Stop reason: ALTCHOICE

## 2019-04-22 ENCOUNTER — APPOINTMENT (OUTPATIENT)
Dept: LAB | Facility: HOSPITAL | Age: 74
End: 2019-04-22
Payer: COMMERCIAL

## 2019-04-22 DIAGNOSIS — Z12.11 SCREENING FOR COLON CANCER: ICD-10-CM

## 2019-04-22 LAB — HEMOCCULT STL QL IA: NEGATIVE

## 2019-04-22 PROCEDURE — G0328 FECAL BLOOD SCRN IMMUNOASSAY: HCPCS

## 2019-05-17 ENCOUNTER — HOSPITAL ENCOUNTER (OUTPATIENT)
Dept: RADIOLOGY | Age: 74
Discharge: HOME | End: 2019-05-17
Payer: COMMERCIAL

## 2019-05-17 VITALS — BODY MASS INDEX: 25.49 KG/M2 | HEIGHT: 61 IN | WEIGHT: 135 LBS

## 2019-05-17 DIAGNOSIS — Z12.39 SCREENING FOR BREAST CANCER: ICD-10-CM

## 2019-05-17 DIAGNOSIS — M85.80 OSTEOPENIA, UNSPECIFIED LOCATION: ICD-10-CM

## 2019-05-17 PROCEDURE — 77063 BREAST TOMOSYNTHESIS BI: CPT

## 2019-05-17 PROCEDURE — 77080 DXA BONE DENSITY AXIAL: CPT

## 2019-05-17 PROCEDURE — 77067 SCR MAMMO BI INCL CAD: CPT

## 2019-05-19 ENCOUNTER — APPOINTMENT (OUTPATIENT)
Dept: LAB | Facility: HOSPITAL | Age: 74
End: 2019-05-19
Payer: COMMERCIAL

## 2019-05-19 DIAGNOSIS — E78.49 OTHER HYPERLIPIDEMIA: ICD-10-CM

## 2019-05-19 DIAGNOSIS — K21.9 GASTROESOPHAGEAL REFLUX DISEASE, ESOPHAGITIS PRESENCE NOT SPECIFIED: ICD-10-CM

## 2019-05-19 DIAGNOSIS — Z11.59 ENCOUNTER FOR HEPATITIS C SCREENING TEST FOR LOW RISK PATIENT: ICD-10-CM

## 2019-05-19 DIAGNOSIS — E78.2 MIXED HYPERLIPIDEMIA: ICD-10-CM

## 2019-05-19 DIAGNOSIS — M85.80 OSTEOPENIA, UNSPECIFIED LOCATION: ICD-10-CM

## 2019-05-19 DIAGNOSIS — R73.01 IFG (IMPAIRED FASTING GLUCOSE): ICD-10-CM

## 2019-05-19 DIAGNOSIS — M54.2 NECK PAIN: ICD-10-CM

## 2019-05-19 LAB
ALBUMIN SERPL BCP-MCNC: 3.6 G/DL (ref 3.5–5)
ALP SERPL-CCNC: 119 U/L (ref 46–116)
ALT SERPL W P-5'-P-CCNC: 27 U/L (ref 12–78)
ANION GAP SERPL CALCULATED.3IONS-SCNC: 5 MMOL/L (ref 4–13)
AST SERPL W P-5'-P-CCNC: 21 U/L (ref 5–45)
BASOPHILS # BLD AUTO: 0.05 THOUSANDS/ΜL (ref 0–0.1)
BASOPHILS NFR BLD AUTO: 1 % (ref 0–1)
BILIRUB SERPL-MCNC: 0.45 MG/DL (ref 0.2–1)
BUN SERPL-MCNC: 16 MG/DL (ref 5–25)
CALCIUM SERPL-MCNC: 8.8 MG/DL (ref 8.3–10.1)
CHLORIDE SERPL-SCNC: 104 MMOL/L (ref 100–108)
CHOLEST SERPL-MCNC: 240 MG/DL (ref 50–200)
CO2 SERPL-SCNC: 30 MMOL/L (ref 21–32)
CREAT SERPL-MCNC: 0.82 MG/DL (ref 0.6–1.3)
EOSINOPHIL # BLD AUTO: 0.32 THOUSAND/ΜL (ref 0–0.61)
EOSINOPHIL NFR BLD AUTO: 5 % (ref 0–6)
ERYTHROCYTE [DISTWIDTH] IN BLOOD BY AUTOMATED COUNT: 13.3 % (ref 11.6–15.1)
EST. AVERAGE GLUCOSE BLD GHB EST-MCNC: 134 MG/DL
GFR SERPL CREATININE-BSD FRML MDRD: 71 ML/MIN/1.73SQ M
GLUCOSE P FAST SERPL-MCNC: 105 MG/DL (ref 65–99)
HBA1C MFR BLD: 6.3 % (ref 4.2–6.3)
HCT VFR BLD AUTO: 46.6 % (ref 34.8–46.1)
HCV AB SER QL: NORMAL
HDLC SERPL-MCNC: 49 MG/DL (ref 40–60)
HGB BLD-MCNC: 14.8 G/DL (ref 11.5–15.4)
IMM GRANULOCYTES # BLD AUTO: 0.02 THOUSAND/UL (ref 0–0.2)
IMM GRANULOCYTES NFR BLD AUTO: 0 % (ref 0–2)
LDLC SERPL CALC-MCNC: 159 MG/DL (ref 0–100)
LYMPHOCYTES # BLD AUTO: 2.38 THOUSANDS/ΜL (ref 0.6–4.47)
LYMPHOCYTES NFR BLD AUTO: 38 % (ref 14–44)
MCH RBC QN AUTO: 29.3 PG (ref 26.8–34.3)
MCHC RBC AUTO-ENTMCNC: 31.8 G/DL (ref 31.4–37.4)
MCV RBC AUTO: 92 FL (ref 82–98)
MONOCYTES # BLD AUTO: 0.45 THOUSAND/ΜL (ref 0.17–1.22)
MONOCYTES NFR BLD AUTO: 7 % (ref 4–12)
NEUTROPHILS # BLD AUTO: 3.12 THOUSANDS/ΜL (ref 1.85–7.62)
NEUTS SEG NFR BLD AUTO: 49 % (ref 43–75)
NRBC BLD AUTO-RTO: 0 /100 WBCS
PLATELET # BLD AUTO: 319 THOUSANDS/UL (ref 149–390)
PMV BLD AUTO: 10.6 FL (ref 8.9–12.7)
POTASSIUM SERPL-SCNC: 4.6 MMOL/L (ref 3.5–5.3)
PROT SERPL-MCNC: 8 G/DL (ref 6.4–8.2)
RBC # BLD AUTO: 5.05 MILLION/UL (ref 3.81–5.12)
SODIUM SERPL-SCNC: 139 MMOL/L (ref 136–145)
TRIGL SERPL-MCNC: 162 MG/DL
TSH SERPL DL<=0.05 MIU/L-ACNC: 1.15 UIU/ML (ref 0.36–3.74)
WBC # BLD AUTO: 6.34 THOUSAND/UL (ref 4.31–10.16)

## 2019-05-19 PROCEDURE — 80053 COMPREHEN METABOLIC PANEL: CPT

## 2019-05-19 PROCEDURE — 84443 ASSAY THYROID STIM HORMONE: CPT

## 2019-05-19 PROCEDURE — 86803 HEPATITIS C AB TEST: CPT

## 2019-05-19 PROCEDURE — 80061 LIPID PANEL: CPT

## 2019-05-19 PROCEDURE — 85025 COMPLETE CBC W/AUTO DIFF WBC: CPT

## 2019-05-19 PROCEDURE — 83036 HEMOGLOBIN GLYCOSYLATED A1C: CPT

## 2019-05-19 PROCEDURE — 36415 COLL VENOUS BLD VENIPUNCTURE: CPT

## 2019-05-20 ENCOUNTER — OFFICE VISIT (OUTPATIENT)
Dept: FAMILY MEDICINE CLINIC | Facility: CLINIC | Age: 74
End: 2019-05-20
Payer: COMMERCIAL

## 2019-05-20 VITALS
TEMPERATURE: 98.9 F | RESPIRATION RATE: 18 BRPM | SYSTOLIC BLOOD PRESSURE: 150 MMHG | OXYGEN SATURATION: 96 % | HEIGHT: 61 IN | DIASTOLIC BLOOD PRESSURE: 72 MMHG | BODY MASS INDEX: 25.68 KG/M2 | HEART RATE: 103 BPM | WEIGHT: 136 LBS

## 2019-05-20 DIAGNOSIS — M19.041 PRIMARY OSTEOARTHRITIS OF BOTH HANDS: ICD-10-CM

## 2019-05-20 DIAGNOSIS — R73.01 IFG (IMPAIRED FASTING GLUCOSE): ICD-10-CM

## 2019-05-20 DIAGNOSIS — J45.909 UNCOMPLICATED ASTHMA, UNSPECIFIED ASTHMA SEVERITY, UNSPECIFIED WHETHER PERSISTENT: ICD-10-CM

## 2019-05-20 DIAGNOSIS — M19.042 PRIMARY OSTEOARTHRITIS OF BOTH HANDS: ICD-10-CM

## 2019-05-20 DIAGNOSIS — E78.49 OTHER HYPERLIPIDEMIA: Primary | ICD-10-CM

## 2019-05-20 DIAGNOSIS — K21.9 GASTROESOPHAGEAL REFLUX DISEASE, ESOPHAGITIS PRESENCE NOT SPECIFIED: ICD-10-CM

## 2019-05-20 PROCEDURE — 1036F TOBACCO NON-USER: CPT | Performed by: FAMILY MEDICINE

## 2019-05-20 PROCEDURE — 3008F BODY MASS INDEX DOCD: CPT | Performed by: FAMILY MEDICINE

## 2019-05-20 PROCEDURE — 99214 OFFICE O/P EST MOD 30 MIN: CPT | Performed by: FAMILY MEDICINE

## 2019-05-20 PROCEDURE — 1160F RVW MEDS BY RX/DR IN RCRD: CPT | Performed by: FAMILY MEDICINE

## 2019-05-20 RX ORDER — ATORVASTATIN CALCIUM 20 MG/1
20 TABLET, FILM COATED ORAL DAILY
Qty: 90 TABLET | Refills: 1 | Status: SHIPPED | OUTPATIENT
Start: 2019-05-20 | End: 2019-11-21 | Stop reason: SDUPTHER

## 2019-05-20 RX ORDER — NAPROXEN 500 MG/1
500 TABLET ORAL 2 TIMES DAILY WITH MEALS
Qty: 180 TABLET | Refills: 1 | Status: SHIPPED | OUTPATIENT
Start: 2019-05-20 | End: 2019-08-15 | Stop reason: SDUPTHER

## 2019-05-20 RX ORDER — OMEPRAZOLE 20 MG/1
20 CAPSULE, DELAYED RELEASE ORAL EVERY OTHER DAY
Qty: 90 CAPSULE | Refills: 1 | Status: SHIPPED | OUTPATIENT
Start: 2019-05-20 | End: 2021-12-01

## 2019-06-14 ENCOUNTER — TELEPHONE (OUTPATIENT)
Dept: FAMILY MEDICINE CLINIC | Facility: CLINIC | Age: 74
End: 2019-06-14

## 2019-07-19 NOTE — PROGRESS NOTES
Assessment/Plan:    Osteopenia  Patient has a high deductible insurance, and did not meet the deductible  Thus, the Prolia is too expensive  However, patient notes she had Medicare but has not used it because she has insurance with her employer  She will provide her Medicare information so that we may try to prior authorize the Prolia through Medicare  In the meantime, will start patient on Fosamax 70 mg weekly  Encouraged her to continue OTC calcium and vitamin D BID  Chronic bilateral low back pain without sciatica  Will try a course of steroids for the chronic low back pain  Recommend low back exercises, and a handout in Chinese was provided  While on the steroid, patient may take Tylenol OTC as needed  Once steroid is completed, may return to using Naproxen BID PRN  Offered referral to PT, but patient declines  Diagnoses and all orders for this visit:    Osteopenia, unspecified location  -     alendronate (FOSAMAX) 70 mg tablet; Take 1 tablet (70 mg total) by mouth every 7 days    Chronic bilateral low back pain without sciatica  -     methylPREDNISolone 4 MG tablet therapy pack; Use as directed on package          Subjective: Patient is here to discuss treatment for osteopenia  There are no preventive care reminders to display for this patient  Patient ID: Liliana Simmonds is a 69 y o  female  Patient presents to clinic today for a follow-up visit  She was noted to have severe osteopenia with a T-score of -2 4 of the right femoral neck  She is currently taking calcium with vitamin D OTC  Attempted to obtain Prolia for her severe osteopenia, but her insurance has a high deductible that has not been met, and patient would have to pay out of pocket for the medication which is something she is not able to do  Patient also has trouble with low back pain  She has been taking Naproxen BID without much improvement in her symptoms  She does not want to go to PT    She would like to try a different medication for this issue  Patient is currently working  The following portions of the patient's history were reviewed and updated as appropriate: allergies, current medications, past family history, past medical history, past social history, past surgical history and problem list     Review of Systems   Constitutional: Negative for unexpected weight change  Musculoskeletal: Positive for back pain  Objective:      /78 (BP Location: Left arm, Patient Position: Sitting, Cuff Size: Standard)   Pulse 92   Temp 98 2 °F (36 8 °C) (Oral)   Resp 17   Ht 5' 1" (1 549 m)   Wt 62 1 kg (137 lb)   LMP  (LMP Unknown)   SpO2 98%   BMI 25 89 kg/m²          Physical Exam   Constitutional: She is oriented to person, place, and time  She appears well-developed and well-nourished  She is cooperative  HENT:   Head: Normocephalic and atraumatic  Right Ear: Hearing and external ear normal    Left Ear: Hearing and external ear normal    Eyes: Conjunctivae and lids are normal    Cardiovascular: Normal rate  Pulmonary/Chest: Effort normal    Musculoskeletal: Normal range of motion  Lumbar back: She exhibits tenderness (bilateral SI joints)  She exhibits no bony tenderness (over lumbar spine)  Neurological: She is alert and oriented to person, place, and time  She exhibits normal muscle tone  Gait normal    Skin: Skin is warm and dry  Psychiatric: She has a normal mood and affect  Her speech is normal and behavior is normal    Nursing note and vitals reviewed  I have spent 25 minutes with Patient  today in which greater than 50% of this time was spent in counseling/coordination of care regarding Diagnostic results, Prognosis, Risks and benefits of tx options, Intructions for management, Patient and family education and Impressions

## 2019-07-24 ENCOUNTER — OFFICE VISIT (OUTPATIENT)
Dept: FAMILY MEDICINE CLINIC | Facility: CLINIC | Age: 74
End: 2019-07-24
Payer: COMMERCIAL

## 2019-07-24 VITALS
RESPIRATION RATE: 17 BRPM | HEIGHT: 61 IN | HEART RATE: 92 BPM | DIASTOLIC BLOOD PRESSURE: 78 MMHG | SYSTOLIC BLOOD PRESSURE: 150 MMHG | OXYGEN SATURATION: 98 % | TEMPERATURE: 98.2 F | WEIGHT: 137 LBS | BODY MASS INDEX: 25.86 KG/M2

## 2019-07-24 DIAGNOSIS — M85.80 OSTEOPENIA, UNSPECIFIED LOCATION: Primary | ICD-10-CM

## 2019-07-24 DIAGNOSIS — M54.50 CHRONIC BILATERAL LOW BACK PAIN WITHOUT SCIATICA: ICD-10-CM

## 2019-07-24 DIAGNOSIS — G89.29 CHRONIC BILATERAL LOW BACK PAIN WITHOUT SCIATICA: ICD-10-CM

## 2019-07-24 PROCEDURE — 1036F TOBACCO NON-USER: CPT | Performed by: FAMILY MEDICINE

## 2019-07-24 PROCEDURE — 1160F RVW MEDS BY RX/DR IN RCRD: CPT | Performed by: FAMILY MEDICINE

## 2019-07-24 PROCEDURE — 99214 OFFICE O/P EST MOD 30 MIN: CPT | Performed by: FAMILY MEDICINE

## 2019-07-24 PROCEDURE — 3008F BODY MASS INDEX DOCD: CPT | Performed by: FAMILY MEDICINE

## 2019-07-24 RX ORDER — METHYLPREDNISOLONE 4 MG/1
TABLET ORAL
Qty: 21 EACH | Refills: 0 | Status: SHIPPED | OUTPATIENT
Start: 2019-07-24 | End: 2020-07-07 | Stop reason: ALTCHOICE

## 2019-07-24 RX ORDER — ALENDRONATE SODIUM 70 MG/1
70 TABLET ORAL
Qty: 12 TABLET | Refills: 3 | Status: SHIPPED | OUTPATIENT
Start: 2019-07-24 | End: 2020-06-29

## 2019-07-24 NOTE — PATIENT INSTRUCTIONS
Ejercicios para la espalda baja   CUIDADO AMBULATORIO:   Los ejercicios para la espalda baja  ayudan a sanar y a fortalecer los músculos de apodaca espalda para evitar otra lesión  Pregúntele a apodaca médico si usted necesita acudir con un fisioterapeuta para que le indique ejercicios más avanzado  Busque atención médica de inmediato si:   · Usted tiene dolor severo que le impide moverse  Pregúntele a apodaca Marvie Backers vitaminas y minerales son adecuados para usted  · Apodaca dolor empeora  · Usted tiene un dolor nuevo  · Usted tiene preguntas o inquietudes acerca de apodaca condición o cuidado  Realice los ejercicios para la espalda baja de manera sam:   · Zuleyma paul ejercicios sobre kristofer colchoneta o superficie firme  (no en la cama) para lakia soporte a la columna y evitar dolor en la parte baja de la espalda  · Muévase lenta y suavemente  Evite movimientos rápidos o bruscos  · Respire normalmente  No contenga la respiración  · Deténgase si siente dolor  Es normal que sienta cierta molestia al principio  Practicar los ejercicios con regularidad ayudará a disminuir apodaca incomodidad con el paso del [place]  Ejercicios para la espalda baja: Apodaca médico podría recomendarle que realice ejercicios para la espalda de 10 a 30 minutos cada día  También podría recomendarle que zuleyma ejercicios 1 a 3 veces cada día  Pregunte a apodaca médico cuáles ejercicios son los mejores para usted y con qué frecuencia hacerlos  · Bombeo del tobillo:  Acuéstese boca arriba  Levante apodaca pie (con paul dedos apuntando hacia apodaca chrystal)  Luego, baje apodaca pie (con los dedos apuntando lejos de usted)  Repita paige ejercicio 10 veces en cada lado  · Deslizamiento de talón:  Acuéstese boca arriba  Muy despacio doble kristofer pierna y luego enderécela  Luego, doble la otra pierna y enderécela  Repita 10 veces en cada lado  · Inclinación pélvica:  Acuéstese boca arriba con paul rodillas dobladas y paul pies planos sobre el piso   Coloque paul brazos en kristofer posición relajada junto a apodaca cuerpo  Contraiga los músculos de apodaca abdomen y aplane apodaca espalda contra el piso  Sostenga está posición por 5 segundos  Repita 5 veces  · Estiramiento de la espalda:  Acuéstese boca arriba con paul edgar detrás de apodaca chrystal  Doble paul rodillas y gire la mitad de apodaca cuerpo hacia un lado  Mantenga esta posición por 10 segundos  Repita 3 veces en cada lado  · Levantamiento de la pierna estirada:  Acuéstese bocjake Hung con kristofer pierna estirada  Doble la otra rodilla  Contraiga apodaca abdomen y luego levante lentamente la pierna estirada entre 6 a 12 pulgadas del piso  Mantenga esta posición por 1 a 5 segundos  Baje apodaca pierna lentamente  Repita 10 veces en cada pierna  · Rodillas al pecho:  Acuéstese boca arriba con paul rodillas dobladas y paul pies planos sobre el piso  [place] kristofer de las rodillas hacia apodaca pecho y sosténgala por 5 segundos  Regrese apodaca pierna a la posición inicial  Levante la otra rodilla hacia el pecho y sosténgala por 5 segundos  Ghada esto 5 veces en cada lado  · Posición corby camello:  Coloque paul edgar y [employer]  Arquee apodaca espalda Kathy Livan, hacia el techo y [place]  Arquee apodaca lio dorsal lo más posible  Sostenga está posición por 5 segundos  Levante apodaca chrystal hacia arriba y baje apodaca pecho hacia el piso  Sostenga está posición por 5 segundos  Ghada 3 series o brent se le indique  · Posición de cuclillas contra la pared:  Párese con apodaca espalda contra la pared  Contraiga los músculos de apodaca abdomen  Lentamente deslice apodaca cuerpo hasta que paul rodillas queden dobladas en un ángulo de 45 grados  Mantenga esta posición por 5 segundos  Deslice lentamente apodaca espalda hacia arriba hasta quedar de pie  Repita 10 veces  · Posición de acurrucarse:  Acuéstese boca arriba con paul rodillas dobladas y paul pies planos sobre el piso   Coloque palu edgar con las esteban hacia abajo debajo de la curva de la parte baja de myles espalda  Después, con paul codos [employer], levante paul hombros y [place] a 3 pulgadas  Mantenga myles chrystal a la misma altura de paul hombros  Mantenga esta posición por 5 segundos  Cuando usted pueda hacer paige ejercicio sin sentir dolor por 10 a 15 segundos, puede entonces añadir kristofer rotación  Mientras paul hombros y myles pecho estén levantados del piso, voltee levemente hacia la izquierda y [place]  Repita en el otro lado  · Ejercicio pájaro beverley:  Coloque paul edgar y rodillas sobre el piso  Mantenga paul muñecas directamente debajo de paul hombros y paul rodillas directamente debajo de paul caderas  Contraiga myles ombligo hacia adentro en dirección a myles columna  No estire ni arquee myles espalda  Ponga tensos paul músculos abdominales  Levante un brazo extendido para que se alinee con myles chrystal  Luego, levante la pierna opuesta a myles brazo  Mantenga esta posición por 15 segundos  Baje myles Catia Chester y pierna lentamente y [place] de lado  Ghada 5 series  © 2017 [address] [employer]  Information is for End User's use only and may not be sold, redistributed or otherwise used for commercial purposes  All illustrations and images included in CareNotes® are the copyrighted property of A D A M , Inc  or Keshawn Lincoln  Esta información es sólo para uso en educación  Myles intención no es darle un consejo médico sobre enfermedades o tratamientos  Colsulte con myles [employer] farmacéutico antes de seguir cualquier régimen médico para saber si es seguro y efectivo para usted

## 2019-07-24 NOTE — ASSESSMENT & PLAN NOTE
Patient has a high deductible insurance, and did not meet the deductible  Thus, the Prolia is too expensive  However, patient notes she had Medicare but has not used it because she has insurance with her employer  She will provide her Medicare information so that we may try to prior authorize the Prolia through Medicare  In the meantime, will start patient on Fosamax 70 mg weekly  Encouraged her to continue OTC calcium and vitamin D BID

## 2019-07-24 NOTE — ASSESSMENT & PLAN NOTE
Will try a course of steroids for the chronic low back pain  Recommend low back exercises, and a handout in [place] was provided  While on the steroid, patient may take Tylenol OTC as needed  Once steroid is completed, may return to using Naproxen BID PRN  Offered referral to PT, but patient declines

## 2019-08-15 DIAGNOSIS — M19.042 PRIMARY OSTEOARTHRITIS OF BOTH HANDS: ICD-10-CM

## 2019-08-15 DIAGNOSIS — M19.041 PRIMARY OSTEOARTHRITIS OF BOTH HANDS: ICD-10-CM

## 2019-08-15 RX ORDER — NAPROXEN 500 MG/1
500 TABLET ORAL 2 TIMES DAILY WITH MEALS
Qty: 180 TABLET | Refills: 1 | Status: SHIPPED | OUTPATIENT
Start: 2019-08-15 | End: 2021-12-13

## 2019-10-30 ENCOUNTER — TELEPHONE (OUTPATIENT)
Dept: FAMILY MEDICINE CLINIC | Facility: CLINIC | Age: 74
End: 2019-10-30

## 2019-10-30 NOTE — TELEPHONE ENCOUNTER
Patient states she is taking the  naproxen (NAPROSYN) 500 mg tablet as 2 tabs in am and 2 tabs in pm for the pain, she was taking it as prescribed but it was not effective    Can we prescribe it as taking 2 tabs in am and 2 tabs in pm

## 2019-10-30 NOTE — TELEPHONE ENCOUNTER
Absolutely not! Patient should not be taking that much Naproxen  Patient should not take more than 500 mg of Naproxen BID  If she still has pain on that, she may add Tylenol OTC as needed  If the pain persists, she should schedule an acute visit for that issue

## 2019-11-01 NOTE — TELEPHONE ENCOUNTER
Spoke to pt explained to her she cant take 4 naproxen only bid and take tylenol with med    Pt has a f/u 11/20

## 2019-11-20 ENCOUNTER — OFFICE VISIT (OUTPATIENT)
Dept: FAMILY MEDICINE CLINIC | Facility: CLINIC | Age: 74
End: 2019-11-20
Payer: COMMERCIAL

## 2019-11-20 VITALS
BODY MASS INDEX: 26.24 KG/M2 | RESPIRATION RATE: 18 BRPM | HEART RATE: 86 BPM | HEIGHT: 61 IN | TEMPERATURE: 98.6 F | DIASTOLIC BLOOD PRESSURE: 80 MMHG | OXYGEN SATURATION: 98 % | SYSTOLIC BLOOD PRESSURE: 130 MMHG | WEIGHT: 139 LBS

## 2019-11-20 DIAGNOSIS — M25.562 CHRONIC PAIN OF BOTH KNEES: ICD-10-CM

## 2019-11-20 DIAGNOSIS — M54.50 CHRONIC MIDLINE LOW BACK PAIN WITHOUT SCIATICA: Primary | ICD-10-CM

## 2019-11-20 DIAGNOSIS — Z23 NEED FOR IMMUNIZATION AGAINST INFLUENZA: ICD-10-CM

## 2019-11-20 DIAGNOSIS — G89.29 CHRONIC PAIN OF BOTH KNEES: ICD-10-CM

## 2019-11-20 DIAGNOSIS — G89.29 CHRONIC MIDLINE LOW BACK PAIN WITHOUT SCIATICA: Primary | ICD-10-CM

## 2019-11-20 DIAGNOSIS — M25.561 CHRONIC PAIN OF BOTH KNEES: ICD-10-CM

## 2019-11-20 PROCEDURE — 99214 OFFICE O/P EST MOD 30 MIN: CPT | Performed by: FAMILY MEDICINE

## 2019-11-20 PROCEDURE — 1160F RVW MEDS BY RX/DR IN RCRD: CPT

## 2019-11-20 PROCEDURE — 90682 RIV4 VACC RECOMBINANT DNA IM: CPT

## 2019-11-20 PROCEDURE — 90471 IMMUNIZATION ADMIN: CPT

## 2019-11-20 RX ORDER — CELECOXIB 100 MG/1
100 CAPSULE ORAL 2 TIMES DAILY PRN
Qty: 60 CAPSULE | Refills: 1 | Status: SHIPPED | OUTPATIENT
Start: 2019-11-20 | End: 2021-12-01

## 2019-11-20 NOTE — PATIENT INSTRUCTIONS
Artritis   CUIDADO AMBULATORIO:   Artritis  es kristofer enfermedad que provoca inflamación en más de kristofer articulación  Existen varios tipos de artritis, brent la osteoartritis, la artritis reumatoide y la artritis séptica  Algunos tipos se presentan con inflamación en las articulaciones  Otros tipos desgastan el [place] [place] [address] articulaciones  [place] hace que los huesos de las articulaciones se rocen entre sí cuando usted mueve la articulación  [place] síntomas pueden ser constantes o pueden ser intermitentes  La artritis por lo general empeora con el paso del tiempo y puede provocar daños permanentes en la articulación  Signos y síntomas comunes de la artritis:   · Dolor, inflamación o rigidez en la articulación    · Limitación en el rango de movimiento de la articulación    · Calor o enrojecimiento en la articulación    · Sensibilidad al tocar la articulación    · Articulaciones rígidas en las mañanas que luego se relajan con el movimiento    · Un leonardo crujiente o chasquido al  la articulación    · Kiley Cordial o escalofríos  Busque atención médica de inmediato si:   · Usted tiene fiebre y un ne dolor o inflamación en la articulación  · Usted no puede  la articulación afectada  · Usted tiene dolor articular intenso que no puede tolerar  Pregúntele a apodaca Kristen Hefty vitaminas y minerales son adecuados para usted  · Apodaca dolor o inflamación no mejoran con el tratamiento  · Usted tiene preguntas o inquietudes acerca de apodaca condición o cuidado  El tratamiento  dependerá del tipo de artritis que tenga y de apodaca severidad  Es posible que usted necesite alguno de los siguientes:  · El acetaminofén  renetta el dolor y baja la fiebre  Está disponible sin receta médica  Pregunte la cantidad y la frecuencia con que debe tomarlos  Dg 645  El acetaminofén puede causar daño en el hígado cuando no se leonarda de forma correcta      · AINEs (Analgésicos antiinflamatorios no esteroides) brent el ibuprofeno, ayudan a disminuir la inflamación, el dolor y la [place]  Rosalva medicamento esta disponible con o sin kristofer receta médica  Los AINEs pueden causar sangrado estomacal o problemas renales en ciertas personas  Si usted leonarda un medicamento anticoagulante, siempre pregúntele a apodaca médico si los GUNJAN son seguros para usted  Siempre issa la etiqueta de rsoalva medicamento y [place] instrucciones  · Medicamentos esteroideos  sirve para reducir la inflamación y el dolor  · Cirugía  puede ser necesaria para reparar o reemplazar la articulación lesionada  Controle la artritis:   · Descanse apodaca articulación adolorida para que pueda recuperarse  Apodaca médico podría recomendarle que use muletas o un caminador si la articulación afectada está en kristofer pierna  · Aplique hielo o calor a apodaca articulación  Aplique hielo a apodaca articulación  El hielo también puede contribuir a evitar el daño de los tejidos  Use un paquete de hielo o ponga hielo molido dentro de [employer]  Cúbrala con kristofer toalla y póngasela en la articulación adolorida de 15 a 20 minutos [address] indicaciones  Usted puede aplicarse calor edwige 20 minutos cada 2 horas  Para el tratamiento con calor puede usar compresas calientes o kristofer lámpara de calor  · Eleve apodaca articulación  [place] le ayudará a disminuir la inflamación y el dolor  Eleve apodaca articulación por encima del nivel del corazón con la frecuencia que pueda  [place] apodaca articulación adolorida sobre almohadas para mantenerla cómodamente por encima del nivel del corazón  · Asista a terapia física u ocupacional brent se le indique  Un fisioterapeuta le puede enseñar ejercicios para mejorar la flexibilidad y el rango de [place]  También le pueden mostrar ejercicios que no implican soporte de peso y que son adecuados para las articulaciones brent la natación  El ejercicio puede ayudarle con la flexibilidad de las articulaciones y a reducir el dolor   Un terapeuta ocupacional puede ayudarle para que aprenda a hacer paul actividades cotidianas cuando paul articulaciones estén rígidas o adoloridas  · Mantenga un peso saludable  El exceso de peso ejerce presión a paul articulaciones  Pregunte a apodcaa proveedor cuál debería de ser [employer]  Si necesita bajar de [place], él puede ayudarle a crear un plan para perder peso  La pérdida de [employer] puede ayudar a reducir el dolor y aumentar apodaca habilidad para llevar a cabo paul actividades  La cantidad de ejercicio que necesita puede variar cada día dependiendo de paul síntomas  · Use zapatos sin tacones o de tacones bajos  [place] le servirá para aliviar el dolor y a disminuir la presión que se ejerce en las articulaciones de apodaca Dylan Beal y caderas  · Use dispositivos de apoyo brent se le indique  Es posible que le indiquen que use férulas en las edgar para que paul articulaciones descansen y baje la inflamación  Mientras duerme, use kristofer almohada lo suficientemente firme para que le proporcione soporte a apodaca temi y [place]  Otro equipo  que podría ayudarlo a moverse y a evitar caídas:  · Los zapatos o plantillas ortopédicos  sirven para proporcionar soporte a apodaca pies cuando camina  · Unas muletas, un bastón o un caminador  podrían ayudar a reducir el riesgo de [place] Kimmy caída  También disminuyen la presión [employer] articulaciones afectadas  · Los dispositivos para evitar las caídas  incluyen los asientos elevados para el inodoro y las barras de apoyo de la ronna del baño para ayudarlo a levantarse cuando está sentado  Se pueden colocar barandas en las áreas donde usted necesite equilibrio y [de-identified]  Programe kristofer aleshia con apodaca médico o reumatólogo brent se le indique:  Anote paul preguntas para que se acuerde de hacerlas edwige paul visitas  © 2017 [address] Information is for End User's use only and may not be sold, redistributed or otherwise used for commercial purposes   All illustrations and images included in CareNotes® are the copyrighted property of A D A Radha PELLETIER  Esta información es sólo para uso en educación  Apodaca intención no es darle un consejo médico sobre enfermedades o tratamientos  Colsulte con apodaca [place]a [place] farmacéutico antes de seguir cualquier régimen médico para saber si es seguro y efectivo para usted

## 2019-11-20 NOTE — PROGRESS NOTES
Assessment/Plan:    Chronic midline low back pain without sciatica  Patient was prescribed naproxen 500 mg BID for pain  Patient did not feel like this helped enough, so she started taking two of the 500 mg naproxen tablets twice a day  Discussed with patient that she should not take more naproxen than prescribed  Will discontinue the naproxen  Patient has also been on meloxicam and ibuprofen in the past   Will switch to Celebrex 100 mg BID PRN  Discussed with patient that she may take Tylenol OTC as needed with the Celebrex, but no other NSAIDs  Will also refer to PT and orthopedics for further evaluation and management  Chronic pain of both knees  Will switch from naproxen to Celebrex 100 mg BID PRN and OTC Tylenol as needed for pain  Discussed applying heat 20 minutes three times a day  Will refer to PT for evaluation and management  Will also refer to orthopedics for further management recommendations  Diagnoses and all orders for this visit:    Chronic midline low back pain without sciatica  -     celecoxib (CeleBREX) 100 mg capsule; Take 1 capsule (100 mg total) by mouth 2 (two) times a day as needed for mild pain  -     Ambulatory referral to Physical Therapy; Future  -     Ambulatory referral to Orthopedic Surgery; Future    Chronic pain of both knees  -     celecoxib (CeleBREX) 100 mg capsule; Take 1 capsule (100 mg total) by mouth 2 (two) times a day as needed for mild pain  -     Ambulatory referral to Physical Therapy; Future  -     Ambulatory referral to Orthopedic Surgery; Future    Need for immunization against influenza  -     Cancel: influenza vaccine, 1169-1039, high-dose, PF 0 5 mL (FLUZONE HIGH-DOSE)  -     influenza vaccine, [id number], quadrivalent, recombinant, PF, 0 5 mL, for patients 18 yr+ (FLUBLOK)          Subjective:      Patient ID: Po Cabrera is a 69 y o  female  Back Pain   This is a chronic problem  The current episode started more than 1 year ago   The problem occurs constantly  The pain is present in the lumbar spine and sacro-iliac  The pain does not radiate  The pain is moderate  The pain is the same all the time  Pertinent negatives include no fever  She has tried NSAIDs and analgesics for the symptoms  The treatment provided moderate relief  Knee Pain    The pain is present in the left knee and right knee  The quality of the pain is described as aching  The pain is moderate  The pain has been constant since onset  Associated symptoms include muscle weakness (sometimes feels like left knee is weak)  Pertinent negatives include no inability to bear weight  The symptoms are aggravated by movement  She has tried NSAIDs and acetaminophen for the symptoms  The treatment provided moderate relief  The following portions of the patient's history were reviewed and updated as appropriate: allergies, current medications, past family history, past medical history, past social history, past surgical history and problem list     Review of Systems   Constitutional: Negative for fever  Musculoskeletal: Positive for arthralgias (bilateral knees) and back pain  Objective:      /80 (BP Location: Left arm, Patient Position: Sitting, Cuff Size: Standard)   Pulse 86   Temp 98 6 °F (37 °C) (Oral)   Resp 18   Ht 5' 1" (1 549 m)   Wt 63 kg (139 lb)   LMP  (LMP Unknown)   SpO2 98%   BMI 26 26 kg/m²          Physical Exam   Constitutional: She is oriented to person, place, and time  She appears well-developed and well-nourished  She is cooperative  HENT:   Head: Normocephalic and atraumatic  Right Ear: Hearing and external ear normal    Left Ear: Hearing and external ear normal    Eyes: Conjunctivae and lids are normal    Cardiovascular: Normal rate  Pulmonary/Chest: Effort normal  No respiratory distress  Musculoskeletal: Normal range of motion  Neurological: She is alert and oriented to person, place, and time  She exhibits normal muscle tone   Gait normal    Skin: Skin is warm, dry and intact  Psychiatric: She has a normal mood and affect  Her speech is normal and behavior is normal    Nursing note and vitals reviewed        Lab Results   Component Value Date     07/18/2015    SODIUM 139 05/19/2019    K 4 6 05/19/2019     05/19/2019    CO2 30 05/19/2019    ANIONGAP 6 07/18/2015    AGAP 5 05/19/2019    BUN 16 05/19/2019    CREATININE 0 82 05/19/2019    GLUC 103 10/22/2016    GLUF 105 (H) 05/19/2019    CALCIUM 8 8 05/19/2019    AST 21 05/19/2019    ALT 27 05/19/2019    ALKPHOS 119 (H) 05/19/2019    PROT 7 6 07/18/2015    TP 8 0 05/19/2019    BILITOT 0 47 07/18/2015    TBILI 0 45 05/19/2019    EGFR 71 05/19/2019

## 2019-11-21 DIAGNOSIS — E78.49 OTHER HYPERLIPIDEMIA: ICD-10-CM

## 2019-11-21 PROBLEM — M25.562 CHRONIC PAIN OF BOTH KNEES: Status: ACTIVE | Noted: 2019-11-21

## 2019-11-21 PROBLEM — M25.561 CHRONIC PAIN OF BOTH KNEES: Status: ACTIVE | Noted: 2019-11-21

## 2019-11-21 PROBLEM — G89.29 CHRONIC PAIN OF BOTH KNEES: Status: ACTIVE | Noted: 2019-11-21

## 2019-11-21 RX ORDER — ATORVASTATIN CALCIUM 20 MG/1
TABLET, FILM COATED ORAL
Qty: 30 TABLET | Refills: 0 | Status: SHIPPED | OUTPATIENT
Start: 2019-11-21 | End: 2019-12-15 | Stop reason: SDUPTHER

## 2019-11-21 NOTE — ASSESSMENT & PLAN NOTE
Patient was prescribed naproxen 500 mg BID for pain  Patient did not feel like this helped enough, so she started taking two of the 500 mg naproxen tablets twice a day  Discussed with patient that she should not take more naproxen than prescribed  Will discontinue the naproxen  Patient has also been on meloxicam and ibuprofen in the past   Will switch to Celebrex 100 mg BID PRN  Discussed with patient that she may take Tylenol OTC as needed with the Celebrex, but no other NSAIDs  Will also refer to PT and orthopedics for further evaluation and management

## 2019-11-21 NOTE — ASSESSMENT & PLAN NOTE
Will switch from naproxen to Celebrex 100 mg BID PRN and OTC Tylenol as needed for pain  Discussed applying heat 20 minutes three times a day  Will refer to PT for evaluation and management  Will also refer to orthopedics for further management recommendations

## 2019-11-24 ENCOUNTER — APPOINTMENT (OUTPATIENT)
Dept: LAB | Facility: HOSPITAL | Age: 74
End: 2019-11-24
Payer: COMMERCIAL

## 2019-11-24 DIAGNOSIS — E78.49 OTHER HYPERLIPIDEMIA: ICD-10-CM

## 2019-11-24 DIAGNOSIS — R73.01 IFG (IMPAIRED FASTING GLUCOSE): ICD-10-CM

## 2019-11-24 LAB
ALBUMIN SERPL BCP-MCNC: 3.5 G/DL (ref 3.5–5)
ALP SERPL-CCNC: 118 U/L (ref 46–116)
ALT SERPL W P-5'-P-CCNC: 54 U/L (ref 12–78)
ANION GAP SERPL CALCULATED.3IONS-SCNC: 5 MMOL/L (ref 4–13)
AST SERPL W P-5'-P-CCNC: 28 U/L (ref 5–45)
BILIRUB SERPL-MCNC: 0.36 MG/DL (ref 0.2–1)
BUN SERPL-MCNC: 20 MG/DL (ref 5–25)
CALCIUM SERPL-MCNC: 9.7 MG/DL (ref 8.3–10.1)
CHLORIDE SERPL-SCNC: 106 MMOL/L (ref 100–108)
CHOLEST SERPL-MCNC: 183 MG/DL (ref 50–200)
CO2 SERPL-SCNC: 29 MMOL/L (ref 21–32)
CREAT SERPL-MCNC: 0.85 MG/DL (ref 0.6–1.3)
EST. AVERAGE GLUCOSE BLD GHB EST-MCNC: 128 MG/DL
GFR SERPL CREATININE-BSD FRML MDRD: 68 ML/MIN/1.73SQ M
GLUCOSE P FAST SERPL-MCNC: 95 MG/DL (ref 65–99)
HBA1C MFR BLD: 6.1 % (ref 4.2–6.3)
HDLC SERPL-MCNC: 57 MG/DL
LDLC SERPL CALC-MCNC: 104 MG/DL (ref 0–100)
POTASSIUM SERPL-SCNC: 4.7 MMOL/L (ref 3.5–5.3)
PROT SERPL-MCNC: 8.1 G/DL (ref 6.4–8.2)
SODIUM SERPL-SCNC: 140 MMOL/L (ref 136–145)
TRIGL SERPL-MCNC: 112 MG/DL

## 2019-11-24 PROCEDURE — 36415 COLL VENOUS BLD VENIPUNCTURE: CPT

## 2019-11-24 PROCEDURE — 83036 HEMOGLOBIN GLYCOSYLATED A1C: CPT

## 2019-11-24 PROCEDURE — 80053 COMPREHEN METABOLIC PANEL: CPT

## 2019-11-24 PROCEDURE — 80061 LIPID PANEL: CPT

## 2019-12-15 DIAGNOSIS — E78.49 OTHER HYPERLIPIDEMIA: ICD-10-CM

## 2019-12-16 RX ORDER — ATORVASTATIN CALCIUM 20 MG/1
TABLET, FILM COATED ORAL
Qty: 30 TABLET | Refills: 2 | Status: SHIPPED | OUTPATIENT
Start: 2019-12-16 | End: 2020-04-27

## 2019-12-23 DIAGNOSIS — M19.041 PRIMARY OSTEOARTHRITIS OF BOTH HANDS: ICD-10-CM

## 2019-12-23 DIAGNOSIS — M19.042 PRIMARY OSTEOARTHRITIS OF BOTH HANDS: ICD-10-CM

## 2019-12-23 DIAGNOSIS — E78.49 OTHER HYPERLIPIDEMIA: ICD-10-CM

## 2019-12-23 RX ORDER — NAPROXEN 500 MG/1
TABLET ORAL
Qty: 180 TABLET | Refills: 1 | OUTPATIENT
Start: 2019-12-23

## 2019-12-23 RX ORDER — ATORVASTATIN CALCIUM 20 MG/1
TABLET, FILM COATED ORAL
Qty: 90 TABLET | Refills: 1 | OUTPATIENT
Start: 2019-12-23

## 2019-12-23 NOTE — TELEPHONE ENCOUNTER
Naproxen discontinued at the last visit, as patient was switched to Celebrex  Atorvastatin was refilled on 12/15/19 as previously requested, so should not need further refills at this time  Please be sure that naproxen is discontinued at the pharmacy and that they received the last atorvastatin Rx  Thanks

## 2020-04-25 DIAGNOSIS — E78.49 OTHER HYPERLIPIDEMIA: ICD-10-CM

## 2020-04-27 RX ORDER — ATORVASTATIN CALCIUM 20 MG/1
TABLET, FILM COATED ORAL
Qty: 30 TABLET | Refills: 0 | Status: SHIPPED | OUTPATIENT
Start: 2020-04-27 | End: 2020-05-21

## 2020-05-21 DIAGNOSIS — E78.49 OTHER HYPERLIPIDEMIA: ICD-10-CM

## 2020-05-21 RX ORDER — ATORVASTATIN CALCIUM 20 MG/1
TABLET, FILM COATED ORAL
Qty: 30 TABLET | Refills: 0 | Status: SHIPPED | OUTPATIENT
Start: 2020-05-21 | End: 2020-06-22

## 2020-06-11 DIAGNOSIS — J45.909 UNCOMPLICATED ASTHMA, UNSPECIFIED ASTHMA SEVERITY, UNSPECIFIED WHETHER PERSISTENT: ICD-10-CM

## 2020-06-11 RX ORDER — FLUTICASONE PROPIONATE 250 UG/1
POWDER, METERED RESPIRATORY (INHALATION)
Qty: 60 EACH | Refills: 0 | Status: SHIPPED | OUTPATIENT
Start: 2020-06-11 | End: 2020-07-07 | Stop reason: SDUPTHER

## 2020-06-20 DIAGNOSIS — E78.49 OTHER HYPERLIPIDEMIA: ICD-10-CM

## 2020-06-22 RX ORDER — ATORVASTATIN CALCIUM 20 MG/1
TABLET, FILM COATED ORAL
Qty: 90 TABLET | Refills: 1 | Status: SHIPPED | OUTPATIENT
Start: 2020-06-22 | End: 2021-12-01 | Stop reason: SDUPTHER

## 2020-06-29 DIAGNOSIS — M85.80 OSTEOPENIA, UNSPECIFIED LOCATION: ICD-10-CM

## 2020-06-29 RX ORDER — ALENDRONATE SODIUM 70 MG/1
TABLET ORAL
Qty: 12 TABLET | Refills: 0 | Status: SHIPPED | OUTPATIENT
Start: 2020-06-29 | End: 2020-10-01

## 2020-07-07 ENCOUNTER — OFFICE VISIT (OUTPATIENT)
Dept: FAMILY MEDICINE CLINIC | Facility: CLINIC | Age: 75
End: 2020-07-07
Payer: COMMERCIAL

## 2020-07-07 VITALS
BODY MASS INDEX: 26.06 KG/M2 | OXYGEN SATURATION: 98 % | RESPIRATION RATE: 16 BRPM | WEIGHT: 138 LBS | SYSTOLIC BLOOD PRESSURE: 140 MMHG | HEIGHT: 61 IN | HEART RATE: 99 BPM | TEMPERATURE: 99.5 F | DIASTOLIC BLOOD PRESSURE: 80 MMHG

## 2020-07-07 DIAGNOSIS — J45.909 UNCOMPLICATED ASTHMA, UNSPECIFIED ASTHMA SEVERITY, UNSPECIFIED WHETHER PERSISTENT: ICD-10-CM

## 2020-07-07 DIAGNOSIS — Z12.11 SCREENING FOR COLON CANCER: ICD-10-CM

## 2020-07-07 DIAGNOSIS — K21.9 GASTROESOPHAGEAL REFLUX DISEASE, ESOPHAGITIS PRESENCE NOT SPECIFIED: ICD-10-CM

## 2020-07-07 DIAGNOSIS — E78.49 OTHER HYPERLIPIDEMIA: Primary | ICD-10-CM

## 2020-07-07 DIAGNOSIS — R73.03 PREDIABETES: ICD-10-CM

## 2020-07-07 DIAGNOSIS — E55.9 VITAMIN D INSUFFICIENCY: ICD-10-CM

## 2020-07-07 PROCEDURE — 1036F TOBACCO NON-USER: CPT | Performed by: FAMILY MEDICINE

## 2020-07-07 PROCEDURE — 99214 OFFICE O/P EST MOD 30 MIN: CPT | Performed by: FAMILY MEDICINE

## 2020-07-07 PROCEDURE — 4040F PNEUMOC VAC/ADMIN/RCVD: CPT | Performed by: FAMILY MEDICINE

## 2020-07-07 PROCEDURE — 3288F FALL RISK ASSESSMENT DOCD: CPT | Performed by: FAMILY MEDICINE

## 2020-07-07 PROCEDURE — 3008F BODY MASS INDEX DOCD: CPT | Performed by: FAMILY MEDICINE

## 2020-07-07 PROCEDURE — 1101F PT FALLS ASSESS-DOCD LE1/YR: CPT | Performed by: FAMILY MEDICINE

## 2020-07-07 PROCEDURE — 1160F RVW MEDS BY RX/DR IN RCRD: CPT | Performed by: FAMILY MEDICINE

## 2020-07-07 RX ORDER — ALBUTEROL SULFATE 90 UG/1
2 AEROSOL, METERED RESPIRATORY (INHALATION) EVERY 6 HOURS PRN
Qty: 1 INHALER | Refills: 1 | Status: SHIPPED | OUTPATIENT
Start: 2020-07-07

## 2020-07-07 NOTE — PATIENT INSTRUCTIONS
Hiperlipidemia   LO QUE NECESITA SABER:   ¿Qué es la hiperlipidemia? La hiperlipidemia son los [address] de lípidos ([place]) en apodaca cheo  Estos lípidos incluyen al colesterol o triglicéridos  Los lípidos son producidos por apodaca cuerpo  También provienen de los [place] usted consume  [place] cuerpo necesita lípidos para funcionar correctamente, adrien los niveles altos aumentan apodaca riesgo de enfermedad cardíaca, ataque al corazón y de un derrame cerebral   ¿Qué aumenta mi riesgo para hiperlipidemia? · Antecedentes familiares de niveles altos de lípidos    · Sinai dieta jai en grasas saturadas, colesterol o calorías     · Consumo elevado de alcohol o fumar    · Falta de actividad física regular    · Condiciones médicas brent el hipotiroidismo, obesidad o diabetes tipo 2    · Ciertos medicamentos, brent los de la presión arterial, hormonas y esteroides  ¿Cómo se controla y trata la hiperlipidemia? [place]aca médico podría recomendarle que usted realice cambios en apodaca estilo de wilian para ayudarlo a disminuir los niveles de los lípidos  Es posible que usted también necesite gina medicamentos para disminuir paul niveles de lípidos  Algunos de los cambios en apodaca estilo de wilian que podrían ser necesarios incluyen los siguientes:  · Mantenga un peso saludable  Consulte con apodaca médico cuánto debería pesar  Solicite que lo ayude a crear un plan para bajar de peso si tiene sobrepeso  La pérdida de peso puede disminuir paul niveles de colesterol y triglicéridos  · Ejercítese según indicaciones  El ejercicio reduce los niveles de colesterol y lo ayuda a mantener un peso saludable  [place] 40 minutos o más de ejercicio [facility] 3 y 4 días cada semana  Puede dividir el ejercicio en cuatro entrenamientos de 10 minutos en vez de 40 minutos a la vez  Los ejemplos de ejercicio moderado incluyen caminar enérgicamente, nadar o montar en bicicleta  Trabaje con apodaca médico para planificar el mejor programa de ejercicios para usted      · No fume   La nicotina y otros químicos de los cigarrillos y cigarros pueden aumentar el riesgo de ataque cardíaco y accidente cerebrovascular  Pida información a apodaca médico si usted actualmente fuma y necesita ayuda para dejar de fumar  Los cigarrillos electrónicos o tabaco sin humo todavía contienen nicotina  Consulte con apodaca médico antes de [employer]  · Consuma alimentos saludables para el corazón  Hable con apodaca dietista acerca de kristofer dieta saludable para el corazón  Lo siguiente le ayudará a controlar apodaca hiperlipidemia:     ¨ Reduzca la cantidad total de grasa que usted consume  Elija charles magras, leche descremada o con 1% de [place] y productos lácteos bajos en grasa, brent yogur y [place]  Limite o evite el consumo de carne Lamond Pih  La carne jerri es jai en grasas y colesterol  [id number] [facility] grasas no saludables por grasa saludables  Las grasa que no son saludables incluyen a las grasas saturadas, grasas transgénicas y el colesterol  Elija margarinas suaves que domonique bajas en grasas saturadas y que tengan poca o nada de grasas transgénicas  Las grasas monoinsaturadas son grasas saludables  Estas se encuentran en el aceite de sanderson, el aceite de canola, el aguacate y los lolis secos  Las grasas poliinsaturadas también son saludables  Estas se encuentran en el pescado, la linaza, las nueces y la soya  ¨ Consuma frutas y [employer]  Estas son bajas en calorías y [place] y son [place] buena rema de vitaminas esenciales  [address] verduras de [employer] oscuro, melendez y anaranjado  Los ejemplos incluyen espinaca, col rizada, brócoli y zanahorias  ¨ [place] alimentos ricos en fibras  Elija alimentos de granos enteros y [employer]  Las buenas jama [place] panes integrales o los cereales, los frijoles, chícharos, frutas y verduras  · Pregunte a apodaca médico si usted puede gina alcohol  El alcohol puede aumentar apodaca presión arterial y los niveles de triglicéridos   Un trago equivale a 12 onzas de cerveza, 5 onzas de vino o 1 onza y ½ de [address]  Llame al 911 en [place] de presentar lo siguiente:   · Usted tiene alguno de los siguientes signos de un ataque cardíaco:      ¨ Estornudos, presión, o dolor en burleson pecho que dura mas de 5 minutos o regresa  ¨ Malestar o dolor en burleson espalda, temi, mandíbula, abdomen, o brazo     ¨ Dificultad para respirar    ¨ Náuseas o vómito    ¨ Siente un desvanecimiento o tiene sudores fríos especialmente en el pecho o dificultad para respirar  · Usted tiene alguno de los siguientes signos de derrame cerebral:      ¨ Adormecimiento o caída de un lado de burleson jud     ¨ Debilidad en un brazo o kristofer pierna    ¨ Confusión o debilidad para hablar    ¨ Mareos o dolor de chrystal intenso, o pérdida de la visión  ¿Cuándo juancarlos comunicarme con mi médico?   · Usted tiene preguntas o inquietudes acerca de burleson condición o cuidado  ACUERDOS SOBRE BURLESON CUIDADO:   Usted tiene el derecho de ayudar a planear burleson cuidado  Aprenda todo lo que pueda sobre burleson condición y brent darle tratamiento  Discuta paul opciones de tratamiento con paul médicos para decidir el cuidado que usted desea recibir  Usted siempre tiene el derecho de rechazar el tratamiento  Esta información es sólo para uso en educación  Burleson intención no es darle un consejo médico sobre enfermedades o tratamientos  Colsulte con burleson Lesleigh Amanda farmacéutico antes de seguir cualquier régimen médico para saber si es seguro y efectivo para usted  © 2017 [address] Information is for End User's use only and may not be sold, redistributed or otherwise used for commercial purposes  All illustrations and images included in CareNotes® are the copyrighted property of A D A M , Inc  or Keshawn Lincoln

## 2020-07-07 NOTE — ASSESSMENT & PLAN NOTE
Patient has been taking the atorvastatin as prescribed  She is due for labs to reassess, and those orders were placed today

## 2020-07-07 NOTE — ASSESSMENT & PLAN NOTE
Last vitamin D level in 2019 was 29  Will assess vitamin D level with next labs  Continue OTC vitamin D supplement

## 2020-07-07 NOTE — PROGRESS NOTES
Assessment/Plan:    Acid reflux disease  Stable  Continue omeprazole 20 mg daily  Uncomplicated asthma  Stable  Continue Flovent BID and PRN albuterol  Hyperlipidemia  Patient has been taking the atorvastatin as prescribed  She is due for labs to reassess, and those orders were placed today  Prediabetes  Lab ordered to reassess  Continue therapeutic lifestyle changes  Vitamin D insufficiency  Last vitamin D level in 2019 was 29  Will assess vitamin D level with next labs  Continue OTC vitamin D supplement  Diagnoses and all orders for this visit:    Other hyperlipidemia  -     Lipid Panel with Direct LDL reflex; Future  -     Comprehensive metabolic panel; Future    Prediabetes  -     HEMOGLOBIN A1C W/ EAG ESTIMATION; Future  -     CBC and Platelet; Future    Screening for colon cancer  -     Occult Blood, Fecal Immunochemical; Future    Vitamin D insufficiency  -     Vitamin D 25 hydroxy; Future    Uncomplicated asthma, unspecified asthma severity, unspecified whether persistent  -     Fluticasone Propionate, Inhal, (Flovent Diskus) 250 MCG/BLIST AEPB; Inhale 1 puff 2 (two) times a day  -     albuterol (Ventolin HFA) 90 mcg/act inhaler; Inhale 2 puffs every 6 (six) hours as needed for wheezing or shortness of breath    Gastroesophageal reflux disease, esophagitis presence not specified          Subjective:      Patient ID: Trisha Plata is a 77 y o  female  Hyperlipidemia   This is a chronic problem  The current episode started more than 1 year ago  Pertinent negatives include no chest pain or shortness of breath  Current antihyperlipidemic treatment includes statins  Risk factors for coronary artery disease include post-menopausal, a sedentary lifestyle and dyslipidemia  Heartburn   She reports no chest pain or no coughing  This is a chronic problem  The current episode started more than 1 year ago  Risk factors include lack of exercise  She has tried a PPI for the symptoms   The treatment provided significant relief  Asthma   There is no chest tightness, cough or shortness of breath  This is a chronic problem  The current episode started more than 1 year ago  The problem has been unchanged  Pertinent negatives include no chest pain or dyspnea on exertion  Her symptoms are alleviated by steroid inhaler and beta-agonist  She reports complete improvement on treatment  Her past medical history is significant for asthma  The following portions of the patient's history were reviewed and updated as appropriate: allergies, current medications, past family history, past medical history, past social history, past surgical history and problem list     Review of Systems   Respiratory: Negative for cough and shortness of breath  Cardiovascular: Negative for chest pain and dyspnea on exertion  Objective:      /80 (BP Location: Left arm, Patient Position: Sitting, Cuff Size: Standard)   Pulse 99   Temp 99 5 °F (37 5 °C) (Tympanic)   Resp 16   Ht 5' 1" (1 549 m)   Wt 62 6 kg (138 lb)   LMP  (LMP Unknown)   SpO2 98%   BMI 26 07 kg/m²          Physical Exam   Constitutional: She is oriented to person, place, and time  She appears well-developed and well-nourished  She is cooperative  No distress  HENT:   Head: Normocephalic and atraumatic  Right Ear: Hearing and external ear normal    Left Ear: Hearing and external ear normal    Eyes: Conjunctivae and lids are normal    Cardiovascular: Normal rate  Pulmonary/Chest: Effort normal and breath sounds normal  She has no wheezes  She has no rales  Musculoskeletal: Normal range of motion  Neurological: She is alert and oriented to person, place, and time  She exhibits normal muscle tone  Gait normal    Skin: Skin is warm, dry and intact  Psychiatric: She has a normal mood and affect  Her speech is normal and behavior is normal    Nursing note and vitals reviewed  BMI Counseling: Body mass index is 26 07 kg/m²  Follow-up plan was not completed due to elderly patient (73 years old) where weight reduction/weight gain would complicate underlying health condition such as: illness or physical disability and nutritional deficiency

## 2020-10-01 DIAGNOSIS — M85.80 OSTEOPENIA, UNSPECIFIED LOCATION: ICD-10-CM

## 2020-10-01 RX ORDER — ALENDRONATE SODIUM 70 MG/1
TABLET ORAL
Qty: 12 TABLET | Refills: 0 | Status: SHIPPED | OUTPATIENT
Start: 2020-10-01 | End: 2020-12-18

## 2020-10-07 ENCOUNTER — CLINICAL SUPPORT (OUTPATIENT)
Dept: FAMILY MEDICINE CLINIC | Facility: CLINIC | Age: 75
End: 2020-10-07
Payer: COMMERCIAL

## 2020-10-07 DIAGNOSIS — Z23 ENCOUNTER FOR IMMUNIZATION: Primary | ICD-10-CM

## 2020-10-07 PROCEDURE — 90471 IMMUNIZATION ADMIN: CPT

## 2020-10-07 PROCEDURE — 90662 IIV NO PRSV INCREASED AG IM: CPT

## 2020-12-18 DIAGNOSIS — M85.80 OSTEOPENIA, UNSPECIFIED LOCATION: ICD-10-CM

## 2020-12-18 RX ORDER — ALENDRONATE SODIUM 70 MG/1
TABLET ORAL
Qty: 12 TABLET | Refills: 3 | Status: SHIPPED | OUTPATIENT
Start: 2020-12-18 | End: 2021-12-01 | Stop reason: SDUPTHER

## 2021-02-02 DIAGNOSIS — J45.909 UNCOMPLICATED ASTHMA, UNSPECIFIED ASTHMA SEVERITY, UNSPECIFIED WHETHER PERSISTENT: ICD-10-CM

## 2021-02-14 RX ORDER — FLUTICASONE PROPIONATE 250 UG/1
POWDER, METERED RESPIRATORY (INHALATION)
Qty: 1 EACH | Refills: 5 | Status: SHIPPED | OUTPATIENT
Start: 2021-02-14 | End: 2021-12-01 | Stop reason: SDUPTHER

## 2021-03-03 ENCOUNTER — OFFICE VISIT (OUTPATIENT)
Dept: FAMILY MEDICINE CLINIC | Facility: CLINIC | Age: 76
End: 2021-03-03
Payer: COMMERCIAL

## 2021-03-03 VITALS
SYSTOLIC BLOOD PRESSURE: 162 MMHG | DIASTOLIC BLOOD PRESSURE: 90 MMHG | HEART RATE: 105 BPM | BODY MASS INDEX: 25.89 KG/M2 | OXYGEN SATURATION: 98 % | WEIGHT: 137 LBS | TEMPERATURE: 100.8 F

## 2021-03-03 DIAGNOSIS — J45.21 MILD INTERMITTENT ASTHMA WITH ACUTE EXACERBATION: Primary | ICD-10-CM

## 2021-03-03 DIAGNOSIS — B34.9 VIRAL INFECTION, UNSPECIFIED: ICD-10-CM

## 2021-03-03 PROCEDURE — U0005 INFEC AGEN DETEC AMPLI PROBE: HCPCS | Performed by: FAMILY MEDICINE

## 2021-03-03 PROCEDURE — U0003 INFECTIOUS AGENT DETECTION BY NUCLEIC ACID (DNA OR RNA); SEVERE ACUTE RESPIRATORY SYNDROME CORONAVIRUS 2 (SARS-COV-2) (CORONAVIRUS DISEASE [COVID-19]), AMPLIFIED PROBE TECHNIQUE, MAKING USE OF HIGH THROUGHPUT TECHNOLOGIES AS DESCRIBED BY CMS-2020-01-R: HCPCS | Performed by: FAMILY MEDICINE

## 2021-03-03 PROCEDURE — 99214 OFFICE O/P EST MOD 30 MIN: CPT | Performed by: FAMILY MEDICINE

## 2021-03-03 PROCEDURE — 3725F SCREEN DEPRESSION PERFORMED: CPT | Performed by: FAMILY MEDICINE

## 2021-03-03 RX ORDER — BENZONATATE 200 MG/1
200 CAPSULE ORAL 3 TIMES DAILY PRN
Qty: 20 CAPSULE | Refills: 0 | Status: SHIPPED | OUTPATIENT
Start: 2021-03-03 | End: 2021-12-01 | Stop reason: ALTCHOICE

## 2021-03-03 RX ORDER — ZINC GLUCONATE 50 MG
50 TABLET ORAL DAILY
COMMUNITY
End: 2021-03-10 | Stop reason: ALTCHOICE

## 2021-03-03 RX ORDER — UREA 10 %
500 LOTION (ML) TOPICAL 2 TIMES DAILY
COMMUNITY

## 2021-03-03 RX ORDER — PREDNISONE 20 MG/1
40 TABLET ORAL DAILY
Qty: 10 TABLET | Refills: 0 | Status: SHIPPED | OUTPATIENT
Start: 2021-03-03 | End: 2021-03-08

## 2021-03-03 NOTE — PROGRESS NOTES
Assessment/Plan:    Mild intermittent asthma with acute exacerbation  She uses flovent BID and albuterol as needed  Use a 5 day course of steroids  If worsening shortness of breath occurs then call office    - Covid testing done today  - cough suppressant sent          Diagnoses and all orders for this visit:    Mild intermittent asthma with acute exacerbation  -     benzonatate (TESSALON) 200 MG capsule; Take 1 capsule (200 mg total) by mouth 3 (three) times a day as needed for cough  -     predniSONE 20 mg tablet; Take 2 tablets (40 mg total) by mouth daily for 5 days    Viral infection, unspecified  -     Novel Coronavirus (Covid-19),PCR [facility] - Collected in Office    Other orders  -     zinc gluconate 50 mg tablet; Take 50 mg by mouth daily  -     magnesium gluconate (MAGONATE) 500 mg tablet; Take 500 mg by mouth 2 (two) times a day          Subjective: cough and chest pressure      Patient ID: Michelle Nicolas is a 77 y o  female with a  Ho prediabetes, HLD, osteoporosis, acid reflux, mod persistent asthma    HPI  Pt reports a hoarse voice, cough and chest pressure  Symptoms started on  and have improved slightly  Symptoms are also worse in the am  She used lysol and swiffer to clean on   No covid contacts, fevers, shortness of breath       The following portions of the patient's history were reviewed and updated as appropriate:   She   Patient Active Problem List    Diagnosis Date Noted    Vitamin D insufficiency 2020    Chronic pain of both knees 2019    Chronic midline low back pain without sciatica 2019    Mild intermittent asthma with acute exacerbation 2019    Neck pain 2018    Prediabetes 2015    Osteopenia 2013    Acid reflux disease 2012    Hyperlipidemia 2012    Osteoarthrosis, hand 2012     She  has a past surgical history that includes  section and Hip surgery (Left, 2014)    Her family history includes Diabetes in her father; No Known Problems in her daughter, daughter, daughter, and maternal aunt; Thyroid cancer in her mother  She  reports that she has never smoked  She has never used smokeless tobacco  She reports that she does not drink alcohol or use drugs  Current Outpatient Medications   Medication Sig Dispense Refill    albuterol (Ventolin HFA) 90 mcg/act inhaler Inhale 2 puffs every 6 (six) hours as needed for wheezing or shortness of breath 1 Inhaler 1    alendronate (FOSAMAX) 70 mg tablet TAKE 1 TABLET BY MOUTH ONE TIME PER WEEK 12 tablet 3    Ascorbic Acid (VITAMIN C) 250 MG CHEW Chew      aspirin 81 mg chewable tablet Chew      atorvastatin (LIPITOR) 20 mg tablet TAKE 1 TABLET BY MOUTH EVERY DAY 90 tablet 1    Calcium 500 MG tablet Take 1 tablet by mouth daily      celecoxib (CeleBREX) 100 mg capsule Take 1 capsule (100 mg total) by mouth 2 (two) times a day as needed for mild pain 60 capsule 1    cholecalciferol (VITAMIN D3) 1,000 units tablet Take 1 tablet by mouth daily      Flovent Diskus 250 MCG/BLIST AEPB TAKE 1 PUFF BY MOUTH TWICE A DAY 1 each 5    magnesium gluconate (MAGONATE) 500 mg tablet Take 500 mg by mouth 2 (two) times a day      naproxen (NAPROSYN) 500 mg tablet Take 1 tablet (500 mg total) by mouth 2 (two) times a day with meals 180 tablet 1    Omega-3 1000 MG CAPS Take by mouth      omeprazole (PriLOSEC) 20 mg delayed release capsule Take 1 capsule (20 mg total) by mouth every other day 90 capsule 1    zinc gluconate 50 mg tablet Take 50 mg by mouth daily      benzonatate (TESSALON) 200 MG capsule Take 1 capsule (200 mg total) by mouth 3 (three) times a day as needed for cough 20 capsule 0    predniSONE 20 mg tablet Take 2 tablets (40 mg total) by mouth daily for 5 days 10 tablet 0     No current facility-administered medications for this visit       Review of Systems   Constitutional: Negative for fever and unexpected weight change     HENT: Positive for voice change  Negative for ear pain, sore throat and trouble swallowing  Eyes: Negative for pain and visual disturbance  Respiratory: Positive for cough and chest tightness  Negative for shortness of breath and wheezing  Cardiovascular: Negative for chest pain  Gastrointestinal: Negative for abdominal distention, abdominal pain, blood in stool, constipation, diarrhea, nausea and vomiting  Endocrine: Negative for polydipsia and polyuria  Genitourinary: Negative for dysuria and hematuria  Musculoskeletal: Negative for back pain and myalgias  Skin: Negative for rash  Neurological: Negative for syncope and headaches  Psychiatric/Behavioral: Negative for suicidal ideas  PHQ-9 Depression Screening    PHQ-9:   Frequency of the following problems over the past two weeks:               Objective:      /90 (BP Location: Left arm, Patient Position: Sitting, Cuff Size: Adult)   Pulse 105   Temp (!) 100 8 °F (38 2 °C) (Tympanic)   Wt 62 1 kg (137 lb)   LMP  (LMP Unknown)   SpO2 98%   BMI 25 89 kg/m²          Physical Exam  Constitutional:       Appearance: She is well-developed  HENT:      Head: Normocephalic and atraumatic  Right Ear: External ear normal       Left Ear: External ear normal       Mouth/Throat:      Pharynx: No oropharyngeal exudate  Eyes:      General: No scleral icterus  Conjunctiva/sclera: Conjunctivae normal       Pupils: Pupils are equal, round, and reactive to light  Neck:      Musculoskeletal: Normal range of motion and neck supple  Cardiovascular:      Rate and Rhythm: Normal rate and regular rhythm  Heart sounds: No murmur  No friction rub  No gallop  Pulmonary:      Effort: Pulmonary effort is normal  No respiratory distress  Breath sounds: Normal breath sounds  No wheezing or rales  Abdominal:      General: Bowel sounds are normal  There is no distension  Palpations: Abdomen is soft  There is no mass  Tenderness:  There is no abdominal tenderness  There is no rebound  Musculoskeletal: Normal range of motion  Skin:     General: Skin is warm and dry  Neurological:      Mental Status: She is alert and oriented to person, place, and time

## 2021-03-03 NOTE — PATIENT INSTRUCTIONS

## 2021-03-03 NOTE — ASSESSMENT & PLAN NOTE
She uses flovent BID and albuterol as needed  Use a 5 day course of steroids  If worsening shortness of breath occurs then call office    - Covid testing done today     - cough suppressant sent

## 2021-03-04 ENCOUNTER — TELEMEDICINE (OUTPATIENT)
Dept: FAMILY MEDICINE CLINIC | Facility: CLINIC | Age: 76
End: 2021-03-04
Payer: COMMERCIAL

## 2021-03-04 ENCOUNTER — TELEPHONE (OUTPATIENT)
Dept: FAMILY MEDICINE CLINIC | Facility: CLINIC | Age: 76
End: 2021-03-04

## 2021-03-04 DIAGNOSIS — U07.1 COVID-19: Primary | ICD-10-CM

## 2021-03-04 LAB — SARS-COV-2 RNA RESP QL NAA+PROBE: POSITIVE

## 2021-03-04 PROCEDURE — 99442 PR PHYS/QHP TELEPHONE EVALUATION 11-20 MIN: CPT | Performed by: FAMILY MEDICINE

## 2021-03-04 NOTE — PROGRESS NOTES
COVID-19 Virtual Visit     Assessment/Plan:    Problem List Items Addressed This Visit     None      Visit Diagnoses     COVID-19    -  Primary         Disposition:     Declined bamlamivimab  Taking vit D, C and zinc    Symptoms resolving  First day that isolation can be dced if symptoms permit is 1/35/56  Call or go to ER if symptoms worsen  Fu with nurse tomorrow and with me on Monday  Work note written  I have spent 15 minutes directly with the patient  Greater than 50% of this time was spent in counseling/coordination of care regarding: prognosis, risks and benefits of treatment options and instructions for management  Encounter provider Mary Lewis MD    Provider located at [place] [phone]    Recent Visits  Date Type Provider Dept   03/03/21 Office Visit RoleMD Reggie Jones recent visits within past 7 days and meeting all other requirements     Today's Visits  Date Type Provider Dept   03/04/21 [address] [place] [address], [address] [place] [address]   03/04/21 Telephone Jenny Leahy   Showing today's visits and meeting all other requirements     Future Appointments  No visits were found meeting these conditions  Showing future appointments within next 150 days and meeting all other requirements        Patient agrees to participate in a virtual check in via telephone or video visit instead of presenting to the office to address urgent/immediate medical needs  Patient is aware this is a billable service  After connecting through Telephone, the patient was identified by name and date of birth  Kelly Ambrose was informed that this was a telemedicine visit and that the exam was being conducted confidentially over secure lines  My office door was closed  No one else was in the room   Kelly Ambrose acknowledged consent and understanding of privacy and security of the telemedicine visit  I informed the patient that I have reviewed her record in Epic and presented the opportunity for her to ask any questions regarding the visit today  The patient agreed to participate  It was my intent to perform this visit via video technology but the patient was not able to do a video connection so the visit was completed via audio telephone only  Subjective:   Kassy Davenport is a 77 y o  female who has been screened for COVID-19  Symptom change since last report: resolving  Patient denies fever, chills, fatigue, malaise, congestion, rhinorrhea, sore throat, anosmia, loss of taste, cough, shortness of breath, chest tightness, abdominal pain, nausea, vomiting, diarrhea, myalgias and headaches  Staying home and isolating themselves?: has not  She is taking care to not share personal items and is cleaning all surfaces that are touched often, like counters, tabletops, and doorknobs using household cleaning sprays or wipes  She is wearing a mask when she leaves her room  Date of symptom onset: 2021  Date of positive COVID-19 PCR: 3/3/2021     used  Pt has had sig improvement with prednisone and flovent  She actually went to work today despite instructions to isolation  Educated and reinforced the importance of isolation       Lab Results   Component Value Date    SARSCOV2 Positive (A) 2021     Past Medical History:   Diagnosis Date    Depression     Plantar fasciitis 2013    Vitamin D deficiency      Past Surgical History:   Procedure Laterality Date     SECTION      HIP SURGERY Left 2014    Left anterior total hip arthroplasty     Current Outpatient Medications   Medication Sig Dispense Refill    albuterol (Ventolin HFA) 90 mcg/act inhaler Inhale 2 puffs every 6 (six) hours as needed for wheezing or shortness of breath 1 Inhaler 1    alendronate (FOSAMAX) 70 mg tablet TAKE 1 TABLET BY MOUTH ONE TIME PER WEEK 12 tablet 3    Ascorbic Acid (VITAMIN C) 250 MG CHEW Chew      aspirin 81 mg chewable tablet Chew      atorvastatin (LIPITOR) 20 mg tablet TAKE 1 TABLET BY MOUTH EVERY DAY 90 tablet 1    benzonatate (TESSALON) 200 MG capsule Take 1 capsule (200 mg total) by mouth 3 (three) times a day as needed for cough 20 capsule 0    Calcium 500 MG tablet Take 1 tablet by mouth daily      celecoxib (CeleBREX) 100 mg capsule Take 1 capsule (100 mg total) by mouth 2 (two) times a day as needed for mild pain 60 capsule 1    cholecalciferol (VITAMIN D3) 1,000 units tablet Take 1 tablet by mouth daily      Flovent Diskus 250 MCG/BLIST AEPB TAKE 1 PUFF BY MOUTH TWICE A DAY 1 each 5    magnesium gluconate (MAGONATE) 500 mg tablet Take 500 mg by mouth 2 (two) times a day      naproxen (NAPROSYN) 500 mg tablet Take 1 tablet (500 mg total) by mouth 2 (two) times a day with meals 180 tablet 1    Omega-3 1000 MG CAPS Take by mouth      omeprazole (PriLOSEC) 20 mg delayed release capsule Take 1 capsule (20 mg total) by mouth every other day 90 capsule 1    predniSONE 20 mg tablet Take 2 tablets (40 mg total) by mouth daily for 5 days 10 tablet 0    zinc gluconate 50 mg tablet Take 50 mg by mouth daily       No current facility-administered medications for this visit  Allergies   Allergen Reactions    Eggs Or Egg-Derived Products Diarrhea       Review of Systems   Constitutional: Negative for chills, fatigue and fever  HENT: Negative for congestion, rhinorrhea and sore throat  Respiratory: Negative for cough, chest tightness and shortness of breath  Gastrointestinal: Negative for abdominal pain, diarrhea, nausea and vomiting  Musculoskeletal: Negative for myalgias  Neurological: Negative for headaches  Objective: There were no vitals filed for this visit      Physical Exam   It was my intent to perform this visit via video technology but the patient was not able to do a video connection so the visit was completed via audio telephone only  VIRTUAL VISIT DISCLAIMER    Joeheath Finley acknowledges that she has consented to an online visit or consultation  She understands that the online visit is based solely on information provided by her, and that, in the absence of a face-to-face physical evaluation by the physician, the diagnosis she receives is both limited and provisional in terms of accuracy and completeness  This is not intended to replace a full medical face-to-face evaluation by the physician  Joe Finley understands and accepts these terms

## 2021-03-04 NOTE — LETTER
March 4, 2021    Patient: Kezia Carmen  YOB: 1945  Date of Last Encounter: 3/4/2021      To whom it may concern:     Kezia Carmen has tested positive for COVID-19 (Coronavirus)  She may return to work on 3/11/21, which is 10 days from illness onset (provided symptoms are improving) and 24 hours without fever      Sincerely,         Tamar Pappas MD

## 2021-03-08 ENCOUNTER — TELEMEDICINE (OUTPATIENT)
Dept: FAMILY MEDICINE CLINIC | Facility: CLINIC | Age: 76
End: 2021-03-08
Payer: COMMERCIAL

## 2021-03-08 VITALS — HEIGHT: 61 IN | TEMPERATURE: 97 F | WEIGHT: 137 LBS | BODY MASS INDEX: 25.86 KG/M2

## 2021-03-08 DIAGNOSIS — U07.1 COVID-19: Primary | ICD-10-CM

## 2021-03-08 PROCEDURE — 99213 OFFICE O/P EST LOW 20 MIN: CPT | Performed by: FAMILY MEDICINE

## 2021-03-08 PROCEDURE — 3008F BODY MASS INDEX DOCD: CPT | Performed by: FAMILY MEDICINE

## 2021-03-08 NOTE — LETTER
March 8, 2021    Patient: Jase Liang  YOB: 1945  Date of Last Encounter: 3/4/2021      To whom it may concern:     Jase Liang has tested positive for COVID-19 (Coronavirus)  She may return to work on 3/11/21, which is 10 days from illness onset (provided symptoms are improving) and 24 hours without fever      Sincerely,         Farrah Beal MD

## 2021-03-08 NOTE — PROGRESS NOTES
COVID-19 Virtual Visit     Assessment/Plan:    Problem List Items Addressed This Visit        Other    COVID-19 - Primary         Disposition:     I recommended continued isolation until at least 24 hours have passed since recovery defined as resolution of fever without the use of fever-reducing medications AND improvement in COVID symptoms AND 10 days have passed since onset of symptoms (or 10 days have passed since date of first positive viral diagnostic test for asymptomatic patients)  Doing well  Symptoms resolving  Return to work 3/11/21  Pt will call if symptoms worsen  I have spent 15 minutes directly with the patient  Greater than 50% of this time was spent in counseling/coordination of care regarding: prognosis, risks and benefits of treatment options and instructions for management  Encounter provider Pooja Cisneros MD    Provider located at [address] [id number]    Recent Visits  Date Type Provider Dept   03/04/21 [address] [place] [address]   03/04/21 Telephone [facility]   03/03/21 Office Visit MD Reggie Lowe recent visits within past 7 days and meeting all other requirements     Today's Visits  Date Type Provider Dept   03/08/21 Telemedicine MD Reggie Lowe today's visits and meeting all other requirements     Future Appointments  No visits were found meeting these conditions  Showing future appointments within next 150 days and meeting all other requirements        Patient agrees to participate in a virtual check in via telephone or video visit instead of presenting to the office to address urgent/immediate medical needs  Patient is aware this is a billable service  After connecting through Telephone, the patient was identified by name and date of birth   [facility] Julieta Alessandro was informed that this was a telemedicine visit and that the exam was being conducted confidentially over secure lines  My office door was closed  No one else was in the room  Aleksandr Fonseca acknowledged consent and understanding of privacy and security of the telemedicine visit  I informed the patient that I have reviewed her record in Epic and presented the opportunity for her to ask any questions regarding the visit today  The patient agreed to participate  It was my intent to perform this visit via video technology but the patient was not able to do a video connection so the visit was completed via audio telephone only  Subjective:   Aleksandr Fonseca is a 77 y o  female who has been screened for COVID-19  Symptom change since last report: resolving  Patient's symptoms include anosmia, loss of taste and cough  Patient denies fever, chills, fatigue, malaise, shortness of breath, chest tightness, abdominal pain, nausea, vomiting, diarrhea, myalgias and headaches  Orlin Zepeda has been staying home and has isolated themselves in her home  She is taking care to not share personal items and is cleaning all surfaces that are touched often, like counters, tabletops, and doorknobs using household cleaning sprays or wipes  She is wearing a mask when she leaves her room       Date of symptom onset: 2021  Date of positive COVID-19 PCR: 3/3/2021    Lab Results   Component Value Date    SARSCOV2 Positive (A) 2021     Past Medical History:   Diagnosis Date    Depression     Plantar fasciitis 2013    Vitamin D deficiency      Past Surgical History:   Procedure Laterality Date     SECTION      HIP SURGERY Left 2014    Left anterior total hip arthroplasty     Current Outpatient Medications   Medication Sig Dispense Refill    albuterol (Ventolin HFA) 90 mcg/act inhaler Inhale 2 puffs every 6 (six) hours as needed for wheezing or shortness of breath 1 Inhaler 1    alendronate (FOSAMAX) 70 mg tablet TAKE 1 TABLET BY MOUTH ONE TIME PER WEEK 12 tablet 3    Ascorbic Acid (VITAMIN C) 250 MG CHEW Chew      aspirin 81 mg chewable tablet Chew      atorvastatin (LIPITOR) 20 mg tablet TAKE 1 TABLET BY MOUTH EVERY DAY 90 tablet 1    benzonatate (TESSALON) 200 MG capsule Take 1 capsule (200 mg total) by mouth 3 (three) times a day as needed for cough 20 capsule 0    Calcium 500 MG tablet Take 1 tablet by mouth daily      celecoxib (CeleBREX) 100 mg capsule Take 1 capsule (100 mg total) by mouth 2 (two) times a day as needed for mild pain 60 capsule 1    cholecalciferol (VITAMIN D3) 1,000 units tablet Take 1 tablet by mouth daily      Flovent Diskus 250 MCG/BLIST AEPB TAKE 1 PUFF BY MOUTH TWICE A DAY 1 each 5    magnesium gluconate (MAGONATE) 500 mg tablet Take 500 mg by mouth 2 (two) times a day      naproxen (NAPROSYN) 500 mg tablet Take 1 tablet (500 mg total) by mouth 2 (two) times a day with meals 180 tablet 1    Omega-3 1000 MG CAPS Take by mouth      omeprazole (PriLOSEC) 20 mg delayed release capsule Take 1 capsule (20 mg total) by mouth every other day 90 capsule 1    predniSONE 20 mg tablet Take 2 tablets (40 mg total) by mouth daily for 5 days 10 tablet 0    zinc gluconate 50 mg tablet Take 50 mg by mouth daily       No current facility-administered medications for this visit  Allergies   Allergen Reactions    Eggs Or Egg-Derived Products Diarrhea       Review of Systems   Constitutional: Negative for chills, fatigue and fever  Respiratory: Positive for cough  Negative for chest tightness and shortness of breath  Gastrointestinal: Negative for abdominal pain, diarrhea, nausea and vomiting  Musculoskeletal: Negative for myalgias  Neurological: Negative for headaches       Objective:    Vitals:    03/08/21 1106   Temp: (!) 97 °F (36 1 °C)   TempSrc: Tympanic   Weight: 62 1 kg (137 lb)   Height: 5' 1" (1 549 m)       Physical Exam   It was my intent to perform this visit via video technology but the patient was not able to do a video connection so the visit was completed via audio telephone only  VIRTUAL VISIT DISCLAIMER    Cristobal Bañuelos acknowledges that she has consented to an online visit or consultation  She understands that the online visit is based solely on information provided by her, and that, in the absence of a face-to-face physical evaluation by the physician, the diagnosis she receives is both limited and provisional in terms of accuracy and completeness  This is not intended to replace a full medical face-to-face evaluation by the physician  Cristobal Bañuelos understands and accepts these terms

## 2021-03-09 ENCOUNTER — TELEPHONE (OUTPATIENT)
Dept: FAMILY MEDICINE CLINIC | Facility: CLINIC | Age: 76
End: 2021-03-09

## 2021-03-09 NOTE — TELEPHONE ENCOUNTER
Patient woke up this morning with nausea and vomiting very bloated and gassy no taste, she did vomit clear liquids this morning after eating her oatmeal and black coffee  No fever 98 3  She will be taking a walk later on this afternoon to feel better  She is also scheduled to return to work 03/11  Please advise

## 2021-03-10 ENCOUNTER — TELEMEDICINE (OUTPATIENT)
Dept: FAMILY MEDICINE CLINIC | Facility: CLINIC | Age: 76
End: 2021-03-10
Payer: COMMERCIAL

## 2021-03-10 VITALS — TEMPERATURE: 98 F | OXYGEN SATURATION: 97 % | WEIGHT: 137 LBS | BODY MASS INDEX: 25.89 KG/M2

## 2021-03-10 DIAGNOSIS — U07.1 COVID-19: Primary | ICD-10-CM

## 2021-03-10 DIAGNOSIS — R11.14 BILIOUS VOMITING WITH NAUSEA: ICD-10-CM

## 2021-03-10 PROCEDURE — 99213 OFFICE O/P EST LOW 20 MIN: CPT | Performed by: FAMILY MEDICINE

## 2021-03-10 PROCEDURE — 1036F TOBACCO NON-USER: CPT | Performed by: FAMILY MEDICINE

## 2021-03-10 PROCEDURE — 1160F RVW MEDS BY RX/DR IN RCRD: CPT | Performed by: FAMILY MEDICINE

## 2021-03-10 RX ORDER — ONDANSETRON 4 MG/1
4 TABLET, ORALLY DISINTEGRATING ORAL EVERY 6 HOURS PRN
Qty: 20 TABLET | Refills: 0 | Status: SHIPPED | OUTPATIENT
Start: 2021-03-10 | End: 2021-12-13

## 2021-03-10 NOTE — PROGRESS NOTES
COVID-19 Virtual Visit     Assessment/Plan:    Problem List Items Addressed This Visit        Other    COVID-19 - Primary    Relevant Medications    ondansetron (ZOFRAN-ODT) 4 mg disintegrating tablet      Other Visit Diagnoses     Bilious vomiting with nausea        Relevant Medications    ondansetron (ZOFRAN-ODT) 4 mg disintegrating tablet         Disposition:     I recommended continued isolation until at least 24 hours have passed since recovery defined as resolution of fever without the use of fever-reducing medications AND improvement in COVID symptoms AND 10 days have passed since onset of symptoms (or 10 days have passed since date of first positive viral diagnostic test for asymptomatic patients)  Pt with nausea that started 2 days ago  Pt able to tolerate liquids but not solids  Use zofran sublingual before eating  Eat a brat diet  Drink plenty of water  Recommended Pedialyte  Avoid zinc, dairy and NSAIDS  Our nurse will check on pt tomorrow  I have spent 15 minutes directly with the patient  Greater than 50% of this time was spent in counseling/coordination of care regarding: prognosis, risks and benefits of treatment options and instructions for management          Encounter provider Ansley Espinoza MD    Provider located at [place] [phone]    Recent Visits  Date Type Provider Dept   03/09/21 Telephone [facility]   03/08/21 [address] [place] [address]   03/04/21 [address] [place] [address]   03/04/21 Telephone [facility]   03/03/21 Office Visit MD Reggie Almonte recent visits within past 7 days and meeting all other requirements     Today's Visits  Date Type Provider Dept   03/10/21 Telemedicine MD Maxwell Almonte today's visits and meeting all other requirements     Future Appointments  No visits were found meeting these conditions  Showing future appointments within next 150 days and meeting all other requirements        Patient agrees to participate in a virtual check in via telephone or video visit instead of presenting to the office to address urgent/immediate medical needs  Patient is aware this is a billable service  After connecting through Telephone, the patient was identified by name and date of birth  Oleksandr Sprague was informed that this was a telemedicine visit and that the exam was being conducted confidentially over secure lines  My office door was closed  No one else was in the room  Oleksandr Sprague acknowledged consent and understanding of privacy and security of the telemedicine visit  I informed the patient that I have reviewed her record in Epic and presented the opportunity for her to ask any questions regarding the visit today  The patient agreed to participate  It was my intent to perform this visit via video technology but the patient was not able to do a video connection so the visit was completed via audio telephone only  Subjective:   Oleksandr Sprague is a 77 y o  female who has been screened for COVID-19  Symptom change since last report: improving  Patient's symptoms include nausea and vomiting (3x phlegm)  Patient denies fever, chills, fatigue, malaise, congestion, rhinorrhea, sore throat, anosmia, loss of taste, cough, shortness of breath, chest tightness, abdominal pain, diarrhea, myalgias and headaches  Ken Babb has been staying home and has isolated themselves in her home  She is taking care to not share personal items and is cleaning all surfaces that are touched often, like counters, tabletops, and doorknobs using household cleaning sprays or wipes  She is wearing a mask when she leaves her room       Date of symptom onset: 2/28/2021  Date of positive COVID-19 PCR: 3/3/2021    Lab Results   Component Value Date    SARSCOV2 Positive (A) 2021     Past Medical History:   Diagnosis Date    Depression     Plantar fasciitis 2013    Vitamin D deficiency      Past Surgical History:   Procedure Laterality Date     SECTION      HIP SURGERY Left 2014    Left anterior total hip arthroplasty     Current Outpatient Medications   Medication Sig Dispense Refill    albuterol (Ventolin HFA) 90 mcg/act inhaler Inhale 2 puffs every 6 (six) hours as needed for wheezing or shortness of breath 1 Inhaler 1    alendronate (FOSAMAX) 70 mg tablet TAKE 1 TABLET BY MOUTH ONE TIME PER WEEK 12 tablet 3    Ascorbic Acid (VITAMIN C) 250 MG CHEW Chew      aspirin 81 mg chewable tablet Chew      Calcium 500 MG tablet Take 1 tablet by mouth daily      celecoxib (CeleBREX) 100 mg capsule Take 1 capsule (100 mg total) by mouth 2 (two) times a day as needed for mild pain 60 capsule 1    cholecalciferol (VITAMIN D3) 1,000 units tablet Take 1 tablet by mouth daily      Flovent Diskus 250 MCG/BLIST AEPB TAKE 1 PUFF BY MOUTH TWICE A DAY 1 each 5    magnesium gluconate (MAGONATE) 500 mg tablet Take 500 mg by mouth 2 (two) times a day      naproxen (NAPROSYN) 500 mg tablet Take 1 tablet (500 mg total) by mouth 2 (two) times a day with meals 180 tablet 1    Omega-3 1000 MG CAPS Take by mouth      omeprazole (PriLOSEC) 20 mg delayed release capsule Take 1 capsule (20 mg total) by mouth every other day 90 capsule 1    atorvastatin (LIPITOR) 20 mg tablet TAKE 1 TABLET BY MOUTH EVERY DAY (Patient not taking: Reported on 3/10/2021) 90 tablet 1    benzonatate (TESSALON) 200 MG capsule Take 1 capsule (200 mg total) by mouth 3 (three) times a day as needed for cough (Patient not taking: Reported on 3/10/2021) 20 capsule 0    ondansetron (ZOFRAN-ODT) 4 mg disintegrating tablet Take 1 tablet (4 mg total) by mouth every 6 (six) hours as needed for nausea or vomiting 20 tablet 0     No current facility-administered medications for this visit  Allergies   Allergen Reactions    Eggs Or Egg-Derived Products Diarrhea       Review of Systems   Constitutional: Negative for chills, fatigue and fever  HENT: Negative for congestion, rhinorrhea and sore throat  Respiratory: Negative for cough, chest tightness and shortness of breath  Gastrointestinal: Positive for nausea and vomiting (3x phlegm)  Negative for abdominal pain and diarrhea  Musculoskeletal: Negative for myalgias  Neurological: Negative for headaches  Objective:    Vitals:    03/10/21 1444   Temp: 98 °F (36 7 °C)   SpO2: 97%   Weight: 62 1 kg (137 lb)       Physical Exam   It was my intent to perform this visit via video technology but the patient was not able to do a video connection so the visit was completed via audio telephone only  VIRTUAL VISIT DISCLAIMER    Nicolle Spann acknowledges that she has consented to an online visit or consultation  She understands that the online visit is based solely on information provided by her, and that, in the absence of a face-to-face physical evaluation by the physician, the diagnosis she receives is both limited and provisional in terms of accuracy and completeness  This is not intended to replace a full medical face-to-face evaluation by the physician  Nicolle Spann understands and accepts these terms

## 2021-08-09 ENCOUNTER — OFFICE VISIT (OUTPATIENT)
Dept: FAMILY MEDICINE CLINIC | Facility: CLINIC | Age: 76
End: 2021-08-09
Payer: COMMERCIAL

## 2021-08-09 ENCOUNTER — TELEPHONE (OUTPATIENT)
Dept: FAMILY MEDICINE CLINIC | Facility: CLINIC | Age: 76
End: 2021-08-09

## 2021-08-09 VITALS
OXYGEN SATURATION: 97 % | TEMPERATURE: 99.7 F | WEIGHT: 130 LBS | DIASTOLIC BLOOD PRESSURE: 80 MMHG | HEART RATE: 113 BPM | HEIGHT: 61 IN | RESPIRATION RATE: 16 BRPM | BODY MASS INDEX: 24.55 KG/M2 | SYSTOLIC BLOOD PRESSURE: 140 MMHG

## 2021-08-09 DIAGNOSIS — L08.9 INFECTED DOG BITE: Primary | ICD-10-CM
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
## 2021-08-09 PROCEDURE — 99214 OFFICE O/P EST MOD 30 MIN: CPT | Performed by: FAMILY MEDICINE

## 2021-08-09 RX ORDER — AMOXICILLIN AND CLAVULANATE POTASSIUM 875; 125 MG/1; MG/1
1 TABLET, FILM COATED ORAL EVERY 12 HOURS SCHEDULED
Qty: 20 TABLET | Refills: 0 | Status: SHIPPED | OUTPATIENT
Start: 2021-08-09 | End: 2021-08-19

## 2021-08-09 NOTE — TELEPHONE ENCOUNTER
Call Triage- Patient reports getting bit by a dog 5 days ago while in [place]  It's a house dog, that has been vaccinated   She has a small bite with a little swelling

## 2021-08-09 NOTE — PROGRESS NOTES
Assessment/Plan:    No problem-specific Assessment & Plan notes found for this encounter  Diagnoses and all orders for this visit:    Infected dog bite  Comments:  Use augmentin bid for 7-10 days  Fu on friday  Go to ER if symptoms worsen  Orders:  -     amoxicillin-clavulanate (AUGMENTIN) 875-125 mg per tablet; Take 1 tablet by mouth every 12 (twelve) hours for 10 days          Subjective: finger infection     Patient ID: Reina Lynch is a 77 y o  female  HPI  Patient suffered a puncture wound bite on the left second finger middle phalange by her dog about 5 days ago  She has been applying neosporin  Denies purulent drainage  The following portions of the patient's history were reviewed and updated as appropriate:   She   Patient Active Problem List    Diagnosis Date Noted    COVID-19 2021    Vitamin D insufficiency 2020    Chronic pain of both knees 2019    Chronic midline low back pain without sciatica 2019    Mild intermittent asthma with acute exacerbation 2019    Neck pain 2018    Prediabetes 2015    Osteopenia 2013    Acid reflux disease 2012    Hyperlipidemia 2012    Osteoarthrosis, hand 2012     She  has a past surgical history that includes  section and Hip surgery (Left, 2014)  Her family history includes Diabetes in her father; No Known Problems in her daughter, daughter, daughter, and maternal aunt; Thyroid cancer in her mother  She  reports that she has never smoked  She has never used smokeless tobacco  She reports that she does not drink alcohol and does not use drugs    Current Outpatient Medications   Medication Sig Dispense Refill    albuterol (Ventolin HFA) 90 mcg/act inhaler Inhale 2 puffs every 6 (six) hours as needed for wheezing or shortness of breath 1 Inhaler 1    alendronate (FOSAMAX) 70 mg tablet TAKE 1 TABLET BY MOUTH ONE TIME PER WEEK 12 tablet 3    Ascorbic Acid (VITAMIN C) 250 MG CHEW Chew      aspirin 81 mg chewable tablet Chew      atorvastatin (LIPITOR) 20 mg tablet TAKE 1 TABLET BY MOUTH EVERY DAY 90 tablet 1    benzonatate (TESSALON) 200 MG capsule Take 1 capsule (200 mg total) by mouth 3 (three) times a day as needed for cough 20 capsule 0    Calcium 500 MG tablet Take 1 tablet by mouth daily      celecoxib (CeleBREX) 100 mg capsule Take 1 capsule (100 mg total) by mouth 2 (two) times a day as needed for mild pain 60 capsule 1    cholecalciferol (VITAMIN D3) 1,000 units tablet Take 1 tablet by mouth daily      Flovent Diskus 250 MCG/BLIST AEPB TAKE 1 PUFF BY MOUTH TWICE A DAY 1 each 5    magnesium gluconate (MAGONATE) 500 mg tablet Take 500 mg by mouth 2 (two) times a day      naproxen (NAPROSYN) 500 mg tablet Take 1 tablet (500 mg total) by mouth 2 (two) times a day with meals 180 tablet 1    Omega-3 1000 MG CAPS Take by mouth      omeprazole (PriLOSEC) 20 mg delayed release capsule Take 1 capsule (20 mg total) by mouth every other day 90 capsule 1    ondansetron (ZOFRAN-ODT) 4 mg disintegrating tablet Take 1 tablet (4 mg total) by mouth every 6 (six) hours as needed for nausea or vomiting 20 tablet 0    amoxicillin-clavulanate (AUGMENTIN) 875-125 mg per tablet Take 1 tablet by mouth every 12 (twelve) hours for 10 days 20 tablet 0     No current facility-administered medications for this visit       Review of Systems   Constitutional: Negative for fever and unexpected weight change  HENT: Negative for ear pain, sore throat and trouble swallowing  Eyes: Negative for pain and visual disturbance  Respiratory: Negative for cough, chest tightness, shortness of breath and wheezing  Cardiovascular: Negative for chest pain  Gastrointestinal: Negative for abdominal distention, abdominal pain, blood in stool, constipation, diarrhea, nausea and vomiting  Endocrine: Negative for polydipsia and polyuria  Genitourinary: Negative for dysuria and hematuria  Musculoskeletal: Negative for back pain and myalgias  Skin: Positive for wound  Negative for rash  Neurological: Negative for syncope and headaches  Psychiatric/Behavioral: Negative for suicidal ideas  PHQ-9 Depression Screening    PHQ-9:   Frequency of the following problems over the past two weeks:               Objective:      /80 (BP Location: Left arm, Patient Position: Sitting, Cuff Size: Adult)   Pulse (!) 113   Temp 99 7 °F (37 6 °C) (Tympanic)   Resp 16   Ht 5' 1" (1 549 m)   Wt 59 kg (130 lb)   LMP  (LMP Unknown)   SpO2 97%   BMI 24 56 kg/m²          Physical Exam  Constitutional:       Appearance: She is well-developed  HENT:      Head: Normocephalic and atraumatic  Right Ear: External ear normal       Left Ear: External ear normal    Eyes:      General: No scleral icterus  Conjunctiva/sclera: Conjunctivae normal    Pulmonary:      Effort: Pulmonary effort is normal  No respiratory distress  Musculoskeletal:      Cervical back: Normal range of motion  Skin:     Comments: Erythema present of the middle phalange without drainage  Neurological:      Mental Status: She is alert and oriented to person, place, and time

## 2021-08-16 ENCOUNTER — OFFICE VISIT (OUTPATIENT)
Dept: FAMILY MEDICINE CLINIC | Facility: CLINIC | Age: 76
End: 2021-08-16
Payer: COMMERCIAL

## 2021-08-16 VITALS
HEART RATE: 93 BPM | OXYGEN SATURATION: 97 % | TEMPERATURE: 99.1 F | HEIGHT: 61 IN | RESPIRATION RATE: 16 BRPM | DIASTOLIC BLOOD PRESSURE: 70 MMHG | WEIGHT: 132 LBS | SYSTOLIC BLOOD PRESSURE: 140 MMHG | BODY MASS INDEX: 24.92 KG/M2

## 2021-08-16 DIAGNOSIS — Z12.31 ENCOUNTER FOR SCREENING MAMMOGRAM FOR MALIGNANT NEOPLASM OF BREAST: ICD-10-CM

## 2021-08-16 DIAGNOSIS — M79.645 FINGER PAIN, LEFT: ICD-10-CM

## 2021-08-16 DIAGNOSIS — Z12.11 SCREENING FOR COLON CANCER: Primary | ICD-10-CM

## 2021-08-16 PROCEDURE — 99213 OFFICE O/P EST LOW 20 MIN: CPT | Performed by: FAMILY MEDICINE

## 2021-08-16 PROCEDURE — 1036F TOBACCO NON-USER: CPT | Performed by: FAMILY MEDICINE

## 2021-08-16 PROCEDURE — 3008F BODY MASS INDEX DOCD: CPT | Performed by: FAMILY MEDICINE

## 2021-08-16 PROCEDURE — 1101F PT FALLS ASSESS-DOCD LE1/YR: CPT | Performed by: FAMILY MEDICINE

## 2021-08-16 PROCEDURE — 1160F RVW MEDS BY RX/DR IN RCRD: CPT | Performed by: FAMILY MEDICINE

## 2021-08-16 PROCEDURE — 3288F FALL RISK ASSESSMENT DOCD: CPT | Performed by: FAMILY MEDICINE

## 2021-08-16 NOTE — PROGRESS NOTES
Assessment/Plan:    No problem-specific Assessment & Plan notes found for this encounter  Diagnoses and all orders for this visit:    Screening for colon cancer  -     Cologuard    Finger pain, left  Comments:  If still unable to fully flex after completion of abx, get hand xray and fu with hand surgery to eval for tendon rupture or deeper infection  Orders:  -     Ambulatory referral to Hand Surgery; Future  -     XR finger left second digit-index; Future    Encounter for screening mammogram for malignant neoplasm of breast  -     Mammo screening bilateral w 3d & cad; Future    Other orders  -     Cancel: Ambulatory referral to Gastroenterology; Future          Subjective: followup cellulitis     Patient ID: Marshal Tripp is a 77 y o  female  HPI  Here for a follow-up after a puncture wound bite on the left 2nd finger middle phalange  She was bit by her dog about 2 weeks ago  When she was seen last week, there was erythema and swelling present  She was given Augmentin and is currently on day 8  The swelling has significantly improved however she is now unable to completely flex the 2nd finger  There is no warmth, drainage and the swelling has significantly decreased  The following portions of the patient's history were reviewed and updated as appropriate: allergies, current medications, past family history, past medical history, past social history, past surgical history and problem list     Review of Systems   Constitutional: Negative for fever and unexpected weight change  HENT: Negative for ear pain, sore throat and trouble swallowing  Eyes: Negative for pain and visual disturbance  Respiratory: Negative for cough, chest tightness, shortness of breath and wheezing  Cardiovascular: Negative for chest pain  Gastrointestinal: Negative for abdominal distention, abdominal pain, blood in stool, constipation, diarrhea, nausea and vomiting  Endocrine: Negative for polydipsia and polyuria  Genitourinary: Negative for dysuria and hematuria  Musculoskeletal: Negative for back pain and myalgias  Skin: Negative for rash  Neurological: Negative for syncope and headaches  Psychiatric/Behavioral: Negative for suicidal ideas  PHQ-9 Depression Screening    PHQ-9:   Frequency of the following problems over the past two weeks:               Objective:      /70 (BP Location: Left arm, Patient Position: Sitting, Cuff Size: Adult)   Pulse 93   Temp 99 1 °F (37 3 °C) (Tympanic)   Resp 16   Ht 5' 1" (1 549 m)   Wt 59 9 kg (132 lb)   LMP  (LMP Unknown)   SpO2 97%   BMI 24 94 kg/m²          Physical Exam  Constitutional:       Appearance: She is well-developed  HENT:      Head: Normocephalic and atraumatic  Right Ear: External ear normal       Left Ear: External ear normal    Eyes:      General: No scleral icterus  Conjunctiva/sclera: Conjunctivae normal    Pulmonary:      Effort: Pulmonary effort is normal  No respiratory distress  Musculoskeletal:      Cervical back: Normal range of motion  Comments: Left second finger nontender  No erythema, fluctuance or induration  Pt able to extend and flex but unable to fully flex  Neurological:      Mental Status: She is alert and oriented to person, place, and time

## 2021-11-04 ENCOUNTER — TELEPHONE (OUTPATIENT)
Dept: FAMILY MEDICINE CLINIC | Facility: CLINIC | Age: 76
End: 2021-11-04

## 2021-11-05 DIAGNOSIS — E78.49 OTHER HYPERLIPIDEMIA: ICD-10-CM

## 2021-11-05 DIAGNOSIS — R73.03 PREDIABETES: Primary | ICD-10-CM

## 2021-11-05 DIAGNOSIS — E55.9 VITAMIN D INSUFFICIENCY: ICD-10-CM

## 2021-11-05 DIAGNOSIS — Z13.1 SCREENING FOR DIABETES MELLITUS (DM): ICD-10-CM

## 2021-11-05 DIAGNOSIS — M85.80 OSTEOPENIA, UNSPECIFIED LOCATION: ICD-10-CM

## 2021-11-28 ENCOUNTER — APPOINTMENT (OUTPATIENT)
Dept: LAB | Facility: HOSPITAL | Age: 76
End: 2021-11-28
Payer: COMMERCIAL

## 2021-11-28 DIAGNOSIS — Z13.1 SCREENING FOR DIABETES MELLITUS (DM): ICD-10-CM

## 2021-11-28 DIAGNOSIS — E55.9 VITAMIN D INSUFFICIENCY: ICD-10-CM

## 2021-11-28 DIAGNOSIS — M85.80 OSTEOPENIA, UNSPECIFIED LOCATION: ICD-10-CM

## 2021-11-28 DIAGNOSIS — R73.03 PREDIABETES: ICD-10-CM

## 2021-11-28 DIAGNOSIS — E78.49 OTHER HYPERLIPIDEMIA: ICD-10-CM

## 2021-11-28 LAB
25(OH)D3 SERPL-MCNC: 45.8 NG/ML (ref 30–100)
ALBUMIN SERPL BCP-MCNC: 3.6 G/DL (ref 3.5–5)
ALP SERPL-CCNC: 84 U/L (ref 46–116)
ALT SERPL W P-5'-P-CCNC: 27 U/L (ref 12–78)
ANION GAP SERPL CALCULATED.3IONS-SCNC: 5 MMOL/L (ref 4–13)
AST SERPL W P-5'-P-CCNC: 17 U/L (ref 5–45)
BILIRUB SERPL-MCNC: 0.67 MG/DL (ref 0.2–1)
BUN SERPL-MCNC: 16 MG/DL (ref 5–25)
CALCIUM SERPL-MCNC: 9.6 MG/DL (ref 8.3–10.1)
CHLORIDE SERPL-SCNC: 105 MMOL/L (ref 100–108)
CHOLEST SERPL-MCNC: 285 MG/DL
CO2 SERPL-SCNC: 28 MMOL/L (ref 21–32)
CREAT SERPL-MCNC: 0.78 MG/DL (ref 0.6–1.3)
EST. AVERAGE GLUCOSE BLD GHB EST-MCNC: 128 MG/DL
GFR SERPL CREATININE-BSD FRML MDRD: 75 ML/MIN/1.73SQ M
GLUCOSE P FAST SERPL-MCNC: 106 MG/DL (ref 65–99)
HBA1C MFR BLD: 6.1 %
HDLC SERPL-MCNC: 46 MG/DL
LDLC SERPL CALC-MCNC: 188 MG/DL (ref 0–100)
NONHDLC SERPL-MCNC: 239 MG/DL
POTASSIUM SERPL-SCNC: 4.1 MMOL/L (ref 3.5–5.3)
PROT SERPL-MCNC: 7.9 G/DL (ref 6.4–8.2)
SODIUM SERPL-SCNC: 138 MMOL/L (ref 136–145)
TRIGL SERPL-MCNC: 257 MG/DL

## 2021-11-28 PROCEDURE — 36415 COLL VENOUS BLD VENIPUNCTURE: CPT

## 2021-11-28 PROCEDURE — 82306 VITAMIN D 25 HYDROXY: CPT

## 2021-11-28 PROCEDURE — 83036 HEMOGLOBIN GLYCOSYLATED A1C: CPT

## 2021-11-28 PROCEDURE — 80061 LIPID PANEL: CPT

## 2021-11-28 PROCEDURE — 80053 COMPREHEN METABOLIC PANEL: CPT

## 2021-12-01 ENCOUNTER — OFFICE VISIT (OUTPATIENT)
Dept: FAMILY MEDICINE CLINIC | Facility: CLINIC | Age: 76
End: 2021-12-01
Payer: COMMERCIAL

## 2021-12-01 VITALS
DIASTOLIC BLOOD PRESSURE: 70 MMHG | OXYGEN SATURATION: 97 % | BODY MASS INDEX: 24.84 KG/M2 | SYSTOLIC BLOOD PRESSURE: 158 MMHG | TEMPERATURE: 98.3 F | RESPIRATION RATE: 16 BRPM | HEART RATE: 98 BPM | WEIGHT: 131.6 LBS | HEIGHT: 61 IN

## 2021-12-01 DIAGNOSIS — M85.80 OSTEOPENIA, UNSPECIFIED LOCATION: ICD-10-CM

## 2021-12-01 DIAGNOSIS — M25.562 CHRONIC PAIN OF BOTH KNEES: ICD-10-CM

## 2021-12-01 DIAGNOSIS — Z23 ENCOUNTER FOR VACCINATION: ICD-10-CM

## 2021-12-01 DIAGNOSIS — E78.49 OTHER HYPERLIPIDEMIA: ICD-10-CM

## 2021-12-01 DIAGNOSIS — R03.0 ELEVATED BLOOD PRESSURE READING IN OFFICE WITHOUT DIAGNOSIS OF HYPERTENSION: ICD-10-CM

## 2021-12-01 DIAGNOSIS — Z13.1 SCREENING FOR DIABETES MELLITUS (DM): ICD-10-CM

## 2021-12-01 DIAGNOSIS — G89.29 CHRONIC PAIN OF BOTH KNEES: ICD-10-CM

## 2021-12-01 DIAGNOSIS — M25.561 CHRONIC PAIN OF BOTH KNEES: ICD-10-CM

## 2021-12-01 DIAGNOSIS — R73.03 PREDIABETES: ICD-10-CM

## 2021-12-01 DIAGNOSIS — J45.909 UNCOMPLICATED ASTHMA, UNSPECIFIED ASTHMA SEVERITY, UNSPECIFIED WHETHER PERSISTENT: ICD-10-CM

## 2021-12-01 DIAGNOSIS — Z00.00 ANNUAL PHYSICAL EXAM: Primary | ICD-10-CM

## 2021-12-01 PROBLEM — U07.1 COVID-19: Status: RESOLVED | Noted: 2021-03-08 | Resolved: 2021-12-01

## 2021-12-01 PROCEDURE — 99214 OFFICE O/P EST MOD 30 MIN: CPT | Performed by: FAMILY MEDICINE

## 2021-12-01 PROCEDURE — 99397 PER PM REEVAL EST PAT 65+ YR: CPT | Performed by: FAMILY MEDICINE

## 2021-12-01 PROCEDURE — 90471 IMMUNIZATION ADMIN: CPT

## 2021-12-01 PROCEDURE — 90662 IIV NO PRSV INCREASED AG IM: CPT

## 2021-12-01 RX ORDER — ALENDRONATE SODIUM 70 MG/1
70 TABLET ORAL
Qty: 12 TABLET | Refills: 3 | Status: SHIPPED | OUTPATIENT
Start: 2021-12-01

## 2021-12-01 RX ORDER — FLUTICASONE PROPIONATE 250 UG/1
1 POWDER, METERED RESPIRATORY (INHALATION) 2 TIMES DAILY
Qty: 60 BLISTER | Refills: 5 | Status: SHIPPED | OUTPATIENT
Start: 2021-12-01 | End: 2022-08-10

## 2021-12-01 RX ORDER — CAPSAICIN 0.07 G/100G
CREAM TOPICAL 3 TIMES DAILY
Qty: 28.3 G | Refills: 0 | Status: SHIPPED | OUTPATIENT
Start: 2021-12-01 | End: 2022-03-13

## 2021-12-01 RX ORDER — ATORVASTATIN CALCIUM 20 MG/1
20 TABLET, FILM COATED ORAL DAILY
Qty: 90 TABLET | Refills: 1 | Status: SHIPPED | OUTPATIENT
Start: 2021-12-01 | End: 2022-06-07

## 2021-12-13 ENCOUNTER — OFFICE VISIT (OUTPATIENT)
Dept: FAMILY MEDICINE CLINIC | Facility: CLINIC | Age: 76
End: 2021-12-13
Payer: COMMERCIAL

## 2021-12-13 VITALS
TEMPERATURE: 99 F | BODY MASS INDEX: 25.11 KG/M2 | DIASTOLIC BLOOD PRESSURE: 70 MMHG | SYSTOLIC BLOOD PRESSURE: 128 MMHG | RESPIRATION RATE: 16 BRPM | WEIGHT: 133 LBS | HEART RATE: 96 BPM | HEIGHT: 61 IN | OXYGEN SATURATION: 98 %

## 2021-12-13 DIAGNOSIS — Z01.810 PREOP CARDIOVASCULAR EXAM: Primary | ICD-10-CM

## 2021-12-13 PROCEDURE — 1160F RVW MEDS BY RX/DR IN RCRD: CPT | Performed by: FAMILY MEDICINE

## 2021-12-13 PROCEDURE — 99213 OFFICE O/P EST LOW 20 MIN: CPT | Performed by: FAMILY MEDICINE

## 2021-12-13 PROCEDURE — 1036F TOBACCO NON-USER: CPT | Performed by: FAMILY MEDICINE

## 2021-12-13 PROCEDURE — 3008F BODY MASS INDEX DOCD: CPT | Performed by: FAMILY MEDICINE

## 2022-03-13 DIAGNOSIS — M25.561 CHRONIC PAIN OF BOTH KNEES: ICD-10-CM

## 2022-03-13 DIAGNOSIS — G89.29 CHRONIC PAIN OF BOTH KNEES: ICD-10-CM

## 2022-03-13 DIAGNOSIS — M25.562 CHRONIC PAIN OF BOTH KNEES: ICD-10-CM

## 2022-03-13 RX ORDER — CAPSAICIN 0.75 MG/G
CREAM TOPICAL
Qty: 57 G | Refills: 0 | Status: SHIPPED | OUTPATIENT
Start: 2022-03-13 | End: 2022-04-09

## 2022-04-09 DIAGNOSIS — M25.562 CHRONIC PAIN OF BOTH KNEES: ICD-10-CM

## 2022-04-09 DIAGNOSIS — G89.29 CHRONIC PAIN OF BOTH KNEES: ICD-10-CM

## 2022-04-09 DIAGNOSIS — M25.561 CHRONIC PAIN OF BOTH KNEES: ICD-10-CM

## 2022-04-09 RX ORDER — CAPSAICIN 0.75 MG/G
CREAM TOPICAL
Qty: 57 G | Refills: 0 | Status: SHIPPED | OUTPATIENT
Start: 2022-04-09 | End: 2022-05-23

## 2022-05-23 DIAGNOSIS — G89.29 CHRONIC PAIN OF BOTH KNEES: ICD-10-CM

## 2022-05-23 DIAGNOSIS — M25.562 CHRONIC PAIN OF BOTH KNEES: ICD-10-CM

## 2022-05-23 DIAGNOSIS — M25.561 CHRONIC PAIN OF BOTH KNEES: ICD-10-CM

## 2022-05-23 RX ORDER — CAPSAICIN 0.75 MG/G
CREAM TOPICAL
Qty: 57 G | Refills: 0 | Status: SHIPPED | OUTPATIENT
Start: 2022-05-23 | End: 2022-06-15 | Stop reason: SDUPTHER

## 2022-06-05 ENCOUNTER — APPOINTMENT (OUTPATIENT)
Dept: LAB | Facility: HOSPITAL | Age: 77
End: 2022-06-05
Payer: COMMERCIAL

## 2022-06-05 DIAGNOSIS — R73.03 PREDIABETES: ICD-10-CM

## 2022-06-05 DIAGNOSIS — Z13.1 SCREENING FOR DIABETES MELLITUS (DM): ICD-10-CM

## 2022-06-05 DIAGNOSIS — E78.49 OTHER HYPERLIPIDEMIA: ICD-10-CM

## 2022-06-05 LAB
ANION GAP SERPL CALCULATED.3IONS-SCNC: 5 MMOL/L (ref 4–13)
BUN SERPL-MCNC: 15 MG/DL (ref 5–25)
CALCIUM SERPL-MCNC: 9.6 MG/DL (ref 8.3–10.1)
CHLORIDE SERPL-SCNC: 106 MMOL/L (ref 100–108)
CHOLEST SERPL-MCNC: 216 MG/DL
CO2 SERPL-SCNC: 26 MMOL/L (ref 21–32)
CREAT SERPL-MCNC: 0.74 MG/DL (ref 0.6–1.3)
EST. AVERAGE GLUCOSE BLD GHB EST-MCNC: 126 MG/DL
GFR SERPL CREATININE-BSD FRML MDRD: 78 ML/MIN/1.73SQ M
GLUCOSE P FAST SERPL-MCNC: 100 MG/DL (ref 65–99)
HBA1C MFR BLD: 6 %
HDLC SERPL-MCNC: 52 MG/DL
LDLC SERPL CALC-MCNC: 122 MG/DL (ref 0–100)
NONHDLC SERPL-MCNC: 164 MG/DL
POTASSIUM SERPL-SCNC: 4.2 MMOL/L (ref 3.5–5.3)
SODIUM SERPL-SCNC: 137 MMOL/L (ref 136–145)
TRIGL SERPL-MCNC: 211 MG/DL

## 2022-06-05 PROCEDURE — 80061 LIPID PANEL: CPT

## 2022-06-05 PROCEDURE — 80048 BASIC METABOLIC PNL TOTAL CA: CPT

## 2022-06-05 PROCEDURE — 83036 HEMOGLOBIN GLYCOSYLATED A1C: CPT

## 2022-06-05 PROCEDURE — 36415 COLL VENOUS BLD VENIPUNCTURE: CPT

## 2022-06-07 DIAGNOSIS — E78.49 OTHER HYPERLIPIDEMIA: ICD-10-CM

## 2022-06-07 RX ORDER — ATORVASTATIN CALCIUM 20 MG/1
TABLET, FILM COATED ORAL
Qty: 90 TABLET | Refills: 1 | Status: SHIPPED | OUTPATIENT
Start: 2022-06-07 | End: 2022-06-15 | Stop reason: SDUPTHER

## 2022-06-15 ENCOUNTER — OFFICE VISIT (OUTPATIENT)
Dept: FAMILY MEDICINE CLINIC | Facility: CLINIC | Age: 77
End: 2022-06-15
Payer: COMMERCIAL

## 2022-06-15 VITALS
TEMPERATURE: 97.7 F | RESPIRATION RATE: 16 BRPM | OXYGEN SATURATION: 98 % | HEART RATE: 97 BPM | SYSTOLIC BLOOD PRESSURE: 158 MMHG | HEIGHT: 61 IN | BODY MASS INDEX: 25.68 KG/M2 | WEIGHT: 136 LBS | DIASTOLIC BLOOD PRESSURE: 78 MMHG

## 2022-06-15 DIAGNOSIS — E78.49 OTHER HYPERLIPIDEMIA: ICD-10-CM

## 2022-06-15 DIAGNOSIS — R73.03 PREDIABETES: Primary | ICD-10-CM

## 2022-06-15 DIAGNOSIS — M85.80 OSTEOPENIA, UNSPECIFIED LOCATION: ICD-10-CM

## 2022-06-15 DIAGNOSIS — M25.561 CHRONIC PAIN OF BOTH KNEES: ICD-10-CM

## 2022-06-15 DIAGNOSIS — E78.2 MIXED HYPERLIPIDEMIA: ICD-10-CM

## 2022-06-15 DIAGNOSIS — G89.29 CHRONIC PAIN OF BOTH KNEES: ICD-10-CM

## 2022-06-15 DIAGNOSIS — M25.562 CHRONIC PAIN OF BOTH KNEES: ICD-10-CM

## 2022-06-15 DIAGNOSIS — I10 PRIMARY HYPERTENSION: ICD-10-CM

## 2022-06-15 DIAGNOSIS — J45.21 MILD INTERMITTENT ASTHMA WITH ACUTE EXACERBATION: ICD-10-CM

## 2022-06-15 PROBLEM — R03.0 ELEVATED BLOOD PRESSURE READING IN OFFICE WITHOUT DIAGNOSIS OF HYPERTENSION: Status: ACTIVE | Noted: 2022-06-15

## 2022-06-15 PROCEDURE — 1160F RVW MEDS BY RX/DR IN RCRD: CPT | Performed by: FAMILY MEDICINE

## 2022-06-15 PROCEDURE — 1036F TOBACCO NON-USER: CPT | Performed by: FAMILY MEDICINE

## 2022-06-15 PROCEDURE — 99214 OFFICE O/P EST MOD 30 MIN: CPT | Performed by: FAMILY MEDICINE

## 2022-06-15 RX ORDER — AMLODIPINE BESYLATE 5 MG/1
5 TABLET ORAL DAILY
Qty: 30 TABLET | Refills: 5 | Status: SHIPPED | OUTPATIENT
Start: 2022-06-15 | End: 2022-07-11

## 2022-06-15 RX ORDER — ATORVASTATIN CALCIUM 40 MG/1
40 TABLET, FILM COATED ORAL DAILY
Qty: 90 TABLET | Refills: 1 | Status: SHIPPED | OUTPATIENT
Start: 2022-06-15

## 2022-06-15 RX ORDER — CAPSAICIN 0.07 G/100G
CREAM TOPICAL 3 TIMES DAILY
Qty: 57 G | Refills: 5 | Status: SHIPPED | OUTPATIENT
Start: 2022-06-15

## 2022-06-15 NOTE — PROGRESS NOTES
Assessment/Plan:    Prediabetes  Stable at 6 0  Discussed lifestyle changes and reassess in 6 months  Osteopenia  Unsure why pt was started on a bisphosphonate  She was started on fosamax 7/24/2019  Will continue for the full 5 years and then stop  Mild intermittent asthma with acute exacerbation  Well controlled on flovent bid and alb as needed  Hyperlipidemia  Increase Lipitor to 40  Primary hypertension  Remains elevated  Start amlodipine  Diagnoses and all orders for this visit:    Prediabetes  -     Basic metabolic panel; Future  -     Hemoglobin A1C; Future    Osteopenia, unspecified location    Mild intermittent asthma with acute exacerbation    Mixed hyperlipidemia    Other hyperlipidemia  -     atorvastatin (LIPITOR) 40 mg tablet; Take 1 tablet (40 mg total) by mouth daily  -     Lipid panel; Future    Primary hypertension  -     amLODIPine (NORVASC) 5 mg tablet; Take 1 tablet (5 mg total) by mouth daily        Subjective:  Chronic conditions checkup     Patient ID: Ronal Khan is a 69 y o  female with a history of GERD, hyperlipidemia, prediabetes, osteopenia, mild intermittent asthma, vitamin-D deficiency  HPI  Labs reviewed with patient  Kidney function stable, fasting sugar mildly elevated at 0 100, cholesterol elevated with an LDL of 122, HDL 52, triglycerides of 211, hemoglobin A1c 6 0  The following portions of the patient's history were reviewed and updated as appropriate: allergies, current medications, past family history, past medical history, past social history, past surgical history and problem list     Review of Systems   Constitutional: Negative for fever and unexpected weight change  HENT: Negative for ear pain, sore throat and trouble swallowing  Eyes: Negative for pain and visual disturbance  Respiratory: Negative for cough, chest tightness, shortness of breath and wheezing  Cardiovascular: Negative for chest pain     Gastrointestinal: Negative for abdominal distention, abdominal pain, blood in stool, constipation, diarrhea, nausea and vomiting  Endocrine: Negative for polydipsia and polyuria  Genitourinary: Negative for dysuria and hematuria  Musculoskeletal: Negative for back pain and myalgias  Skin: Negative for rash  Neurological: Negative for syncope and headaches  Psychiatric/Behavioral: Negative for suicidal ideas  PHQ-2/9 Depression Screening         [unfilled]    Objective:      /78 (BP Location: Left arm, Patient Position: Sitting, Cuff Size: Standard)   Pulse 97   Temp 97 7 °F (36 5 °C) (Tympanic)   Resp 16   Ht 5' 1" (1 549 m)   Wt 61 7 kg (136 lb)   LMP  (LMP Unknown)   SpO2 98%   BMI 25 70 kg/m²          Physical Exam  Constitutional:       Appearance: She is well-developed  HENT:      Head: Normocephalic and atraumatic  Right Ear: External ear normal       Left Ear: External ear normal       Mouth/Throat:      Pharynx: No oropharyngeal exudate  Eyes:      General: No scleral icterus  Conjunctiva/sclera: Conjunctivae normal       Pupils: Pupils are equal, round, and reactive to light  Cardiovascular:      Rate and Rhythm: Normal rate and regular rhythm  Heart sounds: No murmur heard  No friction rub  No gallop  Pulmonary:      Effort: Pulmonary effort is normal  No respiratory distress  Breath sounds: Normal breath sounds  No wheezing or rales  Abdominal:      General: Bowel sounds are normal  There is no distension  Palpations: Abdomen is soft  There is no mass  Tenderness: There is no abdominal tenderness  There is no rebound  Musculoskeletal:         General: Normal range of motion  Cervical back: Normal range of motion and neck supple  Skin:     General: Skin is warm and dry  Neurological:      Mental Status: She is alert and oriented to person, place, and time

## 2022-06-15 NOTE — ASSESSMENT & PLAN NOTE
Unsure why pt was started on a bisphosphonate  She was started on fosamax 7/24/2019  Will continue for the full 5 years and then stop

## 2022-06-30 ENCOUNTER — CLINICAL SUPPORT (OUTPATIENT)
Dept: FAMILY MEDICINE CLINIC | Facility: CLINIC | Age: 77
End: 2022-06-30

## 2022-06-30 VITALS — SYSTOLIC BLOOD PRESSURE: 148 MMHG | DIASTOLIC BLOOD PRESSURE: 70 MMHG | OXYGEN SATURATION: 97 %

## 2022-06-30 DIAGNOSIS — Z01.30 BP CHECK: Primary | ICD-10-CM

## 2022-06-30 NOTE — PROGRESS NOTES
Patient is here for a blood pressure check   Patient's bp is 148/70   BP communicated to Dr Kaylin Salcido

## 2022-07-11 DIAGNOSIS — I10 PRIMARY HYPERTENSION: ICD-10-CM

## 2022-07-11 RX ORDER — AMLODIPINE BESYLATE 5 MG/1
5 TABLET ORAL DAILY
Qty: 90 TABLET | Refills: 1 | Status: SHIPPED | OUTPATIENT
Start: 2022-07-11

## 2022-07-26 ENCOUNTER — HOSPITAL ENCOUNTER (OUTPATIENT)
Dept: RADIOLOGY | Age: 77
Discharge: HOME | End: 2022-07-26
Payer: COMMERCIAL

## 2022-07-26 VITALS — BODY MASS INDEX: 25.03 KG/M2 | HEIGHT: 62 IN | WEIGHT: 136 LBS

## 2022-07-26 DIAGNOSIS — Z12.31 ENCOUNTER FOR SCREENING MAMMOGRAM FOR MALIGNANT NEOPLASM OF BREAST: ICD-10-CM

## 2022-07-26 PROCEDURE — 77063 BREAST TOMOSYNTHESIS BI: CPT

## 2022-07-26 PROCEDURE — 77067 SCR MAMMO BI INCL CAD: CPT

## 2022-08-10 DIAGNOSIS — J45.909 UNCOMPLICATED ASTHMA, UNSPECIFIED ASTHMA SEVERITY, UNSPECIFIED WHETHER PERSISTENT: ICD-10-CM

## 2022-12-13 DIAGNOSIS — M25.561 CHRONIC PAIN OF BOTH KNEES: ICD-10-CM

## 2022-12-13 DIAGNOSIS — M25.562 CHRONIC PAIN OF BOTH KNEES: ICD-10-CM

## 2022-12-13 DIAGNOSIS — E78.49 OTHER HYPERLIPIDEMIA: ICD-10-CM

## 2022-12-13 DIAGNOSIS — G89.29 CHRONIC PAIN OF BOTH KNEES: ICD-10-CM

## 2022-12-13 DIAGNOSIS — M85.80 OSTEOPENIA, UNSPECIFIED LOCATION: ICD-10-CM

## 2022-12-13 RX ORDER — ALENDRONATE SODIUM 70 MG/1
70 TABLET ORAL
Qty: 12 TABLET | Refills: 3 | Status: SHIPPED | OUTPATIENT
Start: 2022-12-13

## 2022-12-13 RX ORDER — CAPSAICIN 0.07 G/100G
CREAM TOPICAL 3 TIMES DAILY
Qty: 57 G | Refills: 5 | Status: SHIPPED | OUTPATIENT
Start: 2022-12-13

## 2023-02-16 ENCOUNTER — OFFICE VISIT (OUTPATIENT)
Dept: FAMILY MEDICINE CLINIC | Facility: CLINIC | Age: 78
End: 2023-02-16

## 2023-02-16 VITALS
SYSTOLIC BLOOD PRESSURE: 168 MMHG | BODY MASS INDEX: 24.77 KG/M2 | DIASTOLIC BLOOD PRESSURE: 80 MMHG | TEMPERATURE: 98.3 F | OXYGEN SATURATION: 99 % | HEIGHT: 62 IN | RESPIRATION RATE: 16 BRPM | HEART RATE: 97 BPM | WEIGHT: 134.6 LBS

## 2023-02-16 DIAGNOSIS — E55.9 VITAMIN D INSUFFICIENCY: ICD-10-CM

## 2023-02-16 DIAGNOSIS — Z13.0 SCREENING FOR DEFICIENCY ANEMIA: ICD-10-CM

## 2023-02-16 DIAGNOSIS — M85.80 OSTEOPENIA, UNSPECIFIED LOCATION: ICD-10-CM

## 2023-02-16 DIAGNOSIS — R19.7 DIARRHEA, UNSPECIFIED TYPE: ICD-10-CM

## 2023-02-16 DIAGNOSIS — Z78.0 POSTMENOPAUSAL: ICD-10-CM

## 2023-02-16 DIAGNOSIS — G89.29 CHRONIC PAIN OF BOTH KNEES: ICD-10-CM

## 2023-02-16 DIAGNOSIS — I10 PRIMARY HYPERTENSION: ICD-10-CM

## 2023-02-16 DIAGNOSIS — Z12.11 SCREENING FOR COLON CANCER: ICD-10-CM

## 2023-02-16 DIAGNOSIS — M25.562 CHRONIC PAIN OF BOTH KNEES: ICD-10-CM

## 2023-02-16 DIAGNOSIS — E78.2 MIXED HYPERLIPIDEMIA: ICD-10-CM

## 2023-02-16 DIAGNOSIS — Z00.00 ENCOUNTER FOR ANNUAL WELLNESS EXAM IN MEDICARE PATIENT: Primary | ICD-10-CM

## 2023-02-16 DIAGNOSIS — R73.03 PREDIABETES: ICD-10-CM

## 2023-02-16 DIAGNOSIS — M25.561 CHRONIC PAIN OF BOTH KNEES: ICD-10-CM

## 2023-02-16 DIAGNOSIS — J45.909 UNCOMPLICATED ASTHMA, UNSPECIFIED ASTHMA SEVERITY, UNSPECIFIED WHETHER PERSISTENT: ICD-10-CM

## 2023-02-16 PROBLEM — M54.2 NECK PAIN: Status: RESOLVED | Noted: 2018-07-30 | Resolved: 2023-02-16

## 2023-02-16 RX ORDER — CAPSAICIN 0.75 MG/G
CREAM TOPICAL 3 TIMES DAILY
Qty: 120 G | Refills: 5 | Status: SHIPPED | OUTPATIENT
Start: 2023-02-16

## 2023-02-16 NOTE — PROGRESS NOTES
December 4, 2018      [facility] [place]  [address] [place] [address]  Phone: [phone]  Fax: [phone]       Patient: Lida Reed   YOB: 2001  Date of Visit: 12/04/2018    To Whom It May Concern:    Eloisa Reed  was at [facility] on 12/04/2018. She may return to work/school on 12/6/18 with no restrictions. If you have any questions or concerns, or if I can be of further assistance, please do not hesitate to contact me.    Sincerely,    Lily Chandler NP      Assessment and Plan:     Problem List Items Addressed This Visit        Cardiovascular and Mediastinum    Primary hypertension     uncontrolled  Blood pressure goal per [facility] VIII would be <150/90  On amlodipine 5 but hasn't been using it for several days due to stomach upset  Restrict daily salt intake up to 2 4 g  Advised to walk 30 minutes a day 5 times a week and practice stress relieving measures           Relevant Orders    Comprehensive metabolic panel    Basic metabolic panel       Musculoskeletal and Integument    Osteopenia     Unsure why pt was started on a bisphosphonate  She was started on fosamax 7/24/2019  Will continue for the full 5 years and then stop  Recheck dexa  Relevant Orders    DXA bone density spine hip and pelvis       Other    Hyperlipidemia     On lipitor 40  Recheck  Relevant Orders    Lipid panel    Lipid panel    Prediabetes     Recheck  Relevant Orders    Hemoglobin A1C    Hemoglobin A1C    Chronic pain of both knees    Relevant Medications    capsicum (Arthritis Pain Relieving) 0 075 % topical cream    Vitamin D insufficiency    Relevant Orders    DXA bone density spine hip and pelvis    Vitamin D 25 hydroxy    Vitamin D 25 hydroxy    Encounter for annual wellness exam in Medicare patient - Primary    Diarrhea     Check stool studies  Continue imodium  Start a probiotic  Avoid dairy for 2 weeks and call to report symptoms            Relevant Orders    Stool culture    Clostridium difficile toxin by PCR    Ova and parasite examination    Stool Enteric Bacterial Panel by PCR   Other Visit Diagnoses     Screening for colon cancer        Relevant Orders    Cologuard    Postmenopausal        Relevant Orders    DXA bone density spine hip and pelvis    Screening for deficiency anemia        Relevant Orders    CBC and differential    Uncomplicated asthma, unspecified asthma severity, unspecified whether persistent        Relevant Medications    fluticasone (Flovent Diskus) 250 mcg/actuation diskus inhaler           Preventive health issues were discussed with patient, and age appropriate screening tests were ordered as noted in patient's After Visit Summary  Personalized health advice and appropriate referrals for health education or preventive services given if needed, as noted in patient's After Visit Summary  History of Present Illness:     Patient presents for a Medicare Wellness Visit    HPI     Concerned about diarrhea  She has loose, watery stool about 3-4 times a day after eating  She has been using imodium which resolves symptoms for 24 hrs followed by a hard stool the next day  After eating the stools will be loose again  Occasional mucus  Denies blood in the stool, stomach pain  Symptoms are worse after eating certain foods such as fried eggs, foods with lots of seasoning  Symptoms started around October  She went to the urgent care around that time for a viral infection and was given steroids  She has not used antibiotics  Patient Care Team:  Collins Clarke MD as PCP - General (Family Medicine)     Review of Systems:     Review of Systems   Constitutional: Negative for fever and unexpected weight change  HENT: Negative for ear pain, sore throat and trouble swallowing  Eyes: Negative for pain and visual disturbance  Respiratory: Negative for cough, chest tightness, shortness of breath and wheezing  Cardiovascular: Negative for chest pain  Gastrointestinal: Positive for diarrhea  Negative for abdominal distention, abdominal pain, blood in stool, constipation, nausea and vomiting  Endocrine: Negative for polydipsia and polyuria  Genitourinary: Negative for dysuria and hematuria  Musculoskeletal: Negative for back pain and myalgias  Skin: Negative for rash  Neurological: Negative for syncope and headaches  Psychiatric/Behavioral: Negative for suicidal ideas          Problem List:     Patient Active Problem List Diagnosis   • Acid reflux disease   • Hyperlipidemia   • Prediabetes   • Osteoarthrosis, hand   • Osteopenia   • Mild intermittent asthma with acute exacerbation   • Chronic midline low back pain without sciatica   • Chronic pain of both knees   • Vitamin D insufficiency   • Encounter for annual wellness exam in Medicare patient   • Primary hypertension   • Diarrhea      Past Medical and Surgical History:     Past Medical History:   Diagnosis Date   • Depression    • Plantar fasciitis 2013   • Vitamin D deficiency      Past Surgical History:   Procedure Laterality Date   •  SECTION     • HIP SURGERY Left 2014    Left anterior total hip arthroplasty      Family History:     Family History   Problem Relation Age of Onset   • Thyroid cancer Mother    • Diabetes Father         Diabetes Mellitus   • No Known Problems Daughter    • No Known Problems Daughter    • No Known Problems Daughter    • No Known Problems Maternal Grandmother    • No Known Problems Maternal Grandfather    • No Known Problems Paternal Grandmother    • No Known Problems Paternal Grandfather    • No Known Problems Maternal Aunt       Social History:     Social History     Socioeconomic History   • Marital status: Single     Spouse name: None   • Number of children: None   • Years of education: None   • Highest education level: None   Occupational History   • Occupation: [employer]   Tobacco Use   • Smoking status: Never   • Smokeless tobacco: Never   Substance and Sexual Activity   • Alcohol use: No   • Drug use: No   • Sexual activity: Not Currently   Other Topics Concern   • None   Social History Narrative   • None     Social Determinants of Health     Financial Resource Strain: Low Risk    • Difficulty of Paying Living Expenses: Not hard at all   Food Insecurity: Not on file   Transportation Needs: No Transportation Needs   • Lack of Transportation (Medical): No   • Lack of Transportation (Non-Medical):  No   Physical Activity: Not on file   Stress: Not on file   Social Connections: Not on file   Intimate Partner Violence: Not on file   Housing Stability: Not on file      Medications and Allergies:     Current Outpatient Medications   Medication Sig Dispense Refill   • albuterol (Ventolin HFA) 90 mcg/act inhaler Inhale 2 puffs every 6 (six) hours as needed for wheezing or shortness of breath 1 Inhaler 1   • alendronate (FOSAMAX) 70 mg tablet Take 1 tablet (70 mg total) by mouth every 7 days 12 tablet 3   • amLODIPine (NORVASC) 5 mg tablet TAKE 1 TABLET (5 MG TOTAL) BY MOUTH DAILY  90 tablet 1   • atorvastatin (LIPITOR) 40 mg tablet Take 1 tablet (40 mg total) by mouth daily 90 tablet 1   • Calcium 500 MG tablet Take 1 tablet by mouth daily     • capsicum (Arthritis Pain Relieving) 0 075 % topical cream Apply topically 3 (three) times a day To the affected area 120 g 5   • cholecalciferol (VITAMIN D3) 1,000 units tablet Take 1 tablet by mouth daily     • fluticasone (Flovent Diskus) 250 mcg/actuation diskus inhaler Inhale 2 puffs 2 (two) times a day Rinse mouth after use  60 blister 5   • magnesium gluconate (MAGONATE) 500 mg tablet Take 500 mg by mouth 2 (two) times a day     • Omega-3 1000 MG CAPS Take by mouth       No current facility-administered medications for this visit       Allergies   Allergen Reactions   • Eggs Or Egg-Derived Products - Food Allergy Diarrhea      Immunizations:     Immunization History   Administered Date(s) Administered   • Influenza Split High Dose Preservative Free IM 02/15/2018   • Influenza, high dose seasonal 0 7 mL 04/18/2019, 10/07/2020, 12/01/2021   • Influenza, recombinant, quadrivalent,injectable, preservative free 11/20/2019   • Pneumococcal Conjugate 13-Valent 04/18/2019   • Pneumococcal Polysaccharide PPV23 03/22/2012   • Tdap 04/18/2014      Health Maintenance:         Topic Date Due   • Breast Cancer Screening: Mammogram  07/26/2023   • Hepatitis C Screening  Completed   • Colorectal Cancer Screening  Discontinued         Topic Date Due   • COVID-19 Vaccine (1) Never done   • Influenza Vaccine (1) 09/01/2022      Medicare Screening Tests and Risk Assessments:     Savannah Falk is here for her Subsequent Wellness visit  Last Medicare Wellness visit information reviewed, patient interviewed, no change since last AWV  Health Risk Assessment:   Patient rates overall health as good  Patient feels that their physical health rating is same  Patient is satisfied with their life  Eyesight was rated as same  Hearing was rated as same  Patient feels that their emotional and mental health rating is same  Patients states they are never, rarely angry  Patient states they are never, rarely unusually tired/fatigued  Pain experienced in the last 7 days has been some  Patient's pain rating has been 10/10  Patient states that she has experienced no weight loss or gain in last 6 months  Patient reports bilateral knee pain and knee pain     Fall Risk Screening: In the past year, patient has experienced: no history of falling in past year      Urinary Incontinence Screening:   Patient has not leaked urine accidently in the last six months  Home Safety:  Patient has trouble with stairs inside or outside of their home  Patient has working smoke alarms and has working carbon monoxide detector  Home safety hazards include: none  Nutrition:   Current diet is Regular  Medications:   Patient is currently taking over-the-counter supplements  OTC medications include: see medication list  Patient is able to manage medications  Activities of Daily Living (ADLs)/Instrumental Activities of Daily Living (IADLs):   Walk and transfer into and out of bed and chair?: Yes  Dress and groom yourself?: Yes    Bathe or shower yourself?: Yes    Feed yourself?  Yes  Do your laundry/housekeeping?: Yes  Manage your money, pay your bills and track your expenses?: Yes  Make your own meals?: Yes    Do your own shopping?: Yes    Previous Hospitalizations:   Any hospitalizations or ED visits within the last 12 months?: No      Advance Care Planning:   Living will: No    Advanced directive: No    Five wishes given: Yes      Cognitive Screening:   Provider or family/friend/caregiver concerned regarding cognition?: No    PREVENTIVE SCREENINGS      Cardiovascular Screening:    General: Screening Not Indicated and History Lipid Disorder      Diabetes Screening:     General: Screening Current      Colorectal Cancer Screening:     General: Risks and Benefits Discussed    Due for: Cologuard      Breast Cancer Screening:     General: Screening Current      Cervical Cancer Screening:    General: Screening Not Indicated      Osteoporosis Screening:    General: Risks and Benefits Discussed    Due for: DXA Axial      Abdominal Aortic Aneurysm (AAA) Screening:        General: Screening Not Indicated      Lung Cancer Screening:     General: Screening Not Indicated      Hepatitis C Screening:    General: Screening Current    Screening, Brief Intervention, and Referral to Treatment (SBIRT)    Screening  Typical number of drinks in a day: 0  Typical number of drinks in a week: 0  Interpretation: Low risk drinking behavior  Single Item Drug Screening:  How often have you used an illegal drug (including marijuana) or a prescription medication for non-medical reasons in the past year? never    Single Item Drug Screen Score: 0  Interpretation: Negative screen for possible drug use disorder    Brief Intervention  Alcohol & drug use screenings were reviewed  No concerns regarding substance use disorder identified  Other Counseling Topics:   Calcium and vitamin D intake and regular weightbearing exercise  No results found       Physical Exam:     /80 (BP Location: Left arm, Patient Position: Sitting, Cuff Size: Adult)   Pulse 97   Temp 98 3 °F (36 8 °C) (Tympanic)   Resp 16   Ht 5' 2" (1 575 m)   Wt 61 1 kg (134 lb 9 6 oz)   LMP  (LMP Unknown)   SpO2 99%   BMI 24 62 kg/m²     Physical Exam  Constitutional:       Appearance: She is well-developed  HENT:      Head: Normocephalic and atraumatic  Eyes:      General: No scleral icterus  Conjunctiva/sclera: Conjunctivae normal       Pupils: Pupils are equal, round, and reactive to light  Cardiovascular:      Rate and Rhythm: Normal rate and regular rhythm  Heart sounds: Normal heart sounds  No murmur heard  No friction rub  No gallop  Pulmonary:      Effort: Pulmonary effort is normal  No respiratory distress  Breath sounds: No wheezing or rales  Chest:      Chest wall: No tenderness  Abdominal:      General: Bowel sounds are normal  There is no distension  Palpations: Abdomen is soft  There is no mass  Tenderness: There is no abdominal tenderness  Musculoskeletal:         General: Normal range of motion  Cervical back: Normal range of motion and neck supple  Lymphadenopathy:      Cervical: No cervical adenopathy  Skin:     General: Skin is warm and dry  Capillary Refill: Capillary refill takes less than 2 seconds  Findings: No rash  Neurological:      Mental Status: She is alert and oriented to person, place, and time  Cranial Nerves: No cranial nerve deficit            Adrianne García MD

## 2023-02-16 NOTE — ASSESSMENT & PLAN NOTE
Check stool studies  Continue imodium  Start a probiotic  Avoid dairy for 2 weeks and call to report symptoms

## 2023-02-16 NOTE — ASSESSMENT & PLAN NOTE
Unsure why pt was started on a bisphosphonate  She was started on fosamax 7/24/2019  Will continue for the full 5 years and then stop  Recheck dexa

## 2023-02-16 NOTE — PATIENT INSTRUCTIONS
Medicare Preventive Visit Patient Instructions  Thank you for completing your Welcome to Medicare Visit or Medicare Annual Wellness Visit today  Your next wellness visit will be due in one year (2/17/2024)  The screening/preventive services that you may require over the next 5-10 years are detailed below  Some tests may not apply to you based off risk factors and/or age  Screening tests ordered at today's visit but not completed yet may show as past due  Also, please note that scanned in results may not display below  Preventive Screenings:  Service Recommendations Previous Testing/Comments   Colorectal Cancer Screening  * Colonoscopy    * Fecal Occult Blood Test (FOBT)/Fecal Immunochemical Test (FIT)  * Fecal DNA/Cologuard Test  * Flexible Sigmoidoscopy Age: 39-70 years old   Colonoscopy: every 10 years (may be performed more frequently if at higher risk)  OR  FOBT/FIT: every 1 year  OR  Cologuard: every 3 years  OR  Sigmoidoscopy: every 5 years  Screening may be recommended earlier than age 39 if at higher risk for colorectal cancer  Also, an individualized decision between you and your healthcare provider will decide whether screening between the ages of 74-80 would be appropriate  Colonoscopy: 07/11/2017  FOBT/FIT: Not on file  Cologuard: Not on file  Sigmoidoscopy: Not on file          Breast Cancer Screening Age: 36 years old  Frequency: every 1-2 years  Not required if history of left and right mastectomy Mammogram: 07/26/2022    Screening Current   Cervical Cancer Screening Between the ages of 21-29, pap smear recommended once every 3 years  Between the ages of 33-67, can perform pap smear with HPV co-testing every 5 years     Recommendations may differ for women with a history of total hysterectomy, cervical cancer, or abnormal pap smears in past  Pap Smear: Not on file    Screening Not Indicated   Hepatitis C Screening Once for adults born between 1945 and 1965  More frequently in patients at high risk for Hepatitis C Hep C Antibody: 05/19/2019    Screening Current   Diabetes Screening 1-2 times per year if you're at risk for diabetes or have pre-diabetes Fasting glucose: 100 mg/dL (6/5/2022)  A1C: 6 0 % (6/5/2022)  Screening Current   Cholesterol Screening Once every 5 years if you don't have a lipid disorder  May order more often based on risk factors  Lipid panel: 06/05/2022    Screening Not Indicated  History Lipid Disorder     Other Preventive Screenings Covered by Medicare:  1  Abdominal Aortic Aneurysm (AAA) Screening: covered once if your at risk  You're considered to be at risk if you have a family history of AAA  2  Lung Cancer Screening: covers low dose CT scan once per year if you meet all of the following conditions: (1) Age 50-69; (2) No signs or symptoms of lung cancer; (3) Current smoker or have quit smoking within the last 15 years; (4) You have a tobacco smoking history of at least 20 pack years (packs per day multiplied by number of years you smoked); (5) You get a written order from a healthcare provider  3  Glaucoma Screening: covered annually if you're considered high risk: (1) You have diabetes OR (2) Family history of glaucoma OR (3)  aged 48 and older OR (3)  American aged 72 and older  3  Osteoporosis Screening: covered every 2 years if you meet one of the following conditions: (1) You're estrogen deficient and at risk for osteoporosis based off medical history and other findings; (2) Have a vertebral abnormality; (3) On glucocorticoid therapy for more than 3 months; (4) Have primary hyperparathyroidism; (5) On osteoporosis medications and need to assess response to drug therapy  · Last bone density test (DXA Scan): 05/17/2019   5  HIV Screening: covered annually if you're between the age of 15-65  Also covered annually if you are younger than 13 and older than 72 with risk factors for HIV infection   For pregnant patients, it is covered up to 3 times per pregnancy  Immunizations:  Immunization Recommendations   Influenza Vaccine Annual influenza vaccination during flu season is recommended for all persons aged >= 6 months who do not have contraindications   Pneumococcal Vaccine   * Pneumococcal conjugate vaccine = PCV13 (Prevnar 13), PCV15 (Vaxneuvance), PCV20 (Prevnar 20)  * Pneumococcal polysaccharide vaccine = PPSV23 (Pneumovax) Adults 25-60 years old: 1-3 doses may be recommended based on certain risk factors  Adults 72 years old: 1-2 doses may be recommended based off what pneumonia vaccine you previously received   Hepatitis B Vaccine 3 dose series if at intermediate or high risk (ex: diabetes, end stage renal disease, liver disease)   Tetanus (Td) Vaccine - COST NOT COVERED BY MEDICARE PART B Following completion of primary series, a booster dose should be given every 10 years to maintain immunity against tetanus  Td may also be given as tetanus wound prophylaxis  Tdap Vaccine - COST NOT COVERED BY MEDICARE PART B Recommended at least once for all adults  For pregnant patients, recommended with each pregnancy  Shingles Vaccine (Shingrix) - COST NOT COVERED BY MEDICARE PART B  2 shot series recommended in those aged 48 and above     Health Maintenance Due:      Topic Date Due   • Breast Cancer Screening: Mammogram  07/26/2023   • Hepatitis C Screening  Completed   • Colorectal Cancer Screening  Discontinued     Immunizations Due:      Topic Date Due   • COVID-19 Vaccine (1) Never done   • Influenza Vaccine (1) 09/01/2022     Advance Directives   What are advance directives? Advance directives are legal documents that state your wishes and plans for medical care  These plans are made ahead of time in case you lose your ability to make decisions for yourself  Advance directives can apply to any medical decision, such as the treatments you want, and if you want to donate organs  What are the types of advance directives?   There are many types of advance directives, and each state has rules about how to use them  You may choose a combination of any of the following:  · Living will: This is a written record of the treatment you want  You can also choose which treatments you do not want, which to limit, and which to stop at a certain time  This includes surgery, medicine, IV fluid, and tube feedings  · Durable power of  for healthcare [facility]): This is a written record that states who you want to make healthcare choices for you when you are unable to make them for yourself  This person, called a proxy, is usually a family member or a friend  You may choose more than 1 proxy  · Do not resuscitate (DNR) order:  A DNR order is used in case your heart stops beating or you stop breathing  It is a request not to have certain forms of treatment, such as CPR  A DNR order may be included in other types of advance directives  · Medical directive: This covers the care that you want if you are in a coma, near death, or unable to make decisions for yourself  You can list the treatments you want for each condition  Treatment may include pain medicine, surgery, blood transfusions, dialysis, IV or tube feedings, and a ventilator (breathing machine)  · Values history: This document has questions about your views, beliefs, and how you feel and think about life  This information can help others choose the care that you would choose  Why are advance directives important? An advance directive helps you control your care  Although spoken wishes may be used, it is better to have your wishes written down  Spoken wishes can be misunderstood, or not followed  Treatments may be given even if you do not want them  An advance directive may make it easier for your family to make difficult choices about your care  © Copyright [employer] 2018 Information is for End User's use only and may not be sold, redistributed or otherwise used for commercial purposes   All illustrations and images included in CareNotes® are the copyrighted property of A [employer]  or Isabel Scott

## 2023-02-16 NOTE — ASSESSMENT & PLAN NOTE
• uncontrolled  • Blood pressure goal per JNC VIII would be <150/90  • On amlodipine 5 but hasn't been using it for several days due to stomach upset  • Restrict daily salt intake up to 2 4 g  • Advised to walk 30 minutes a day 5 times a week and practice stress relieving measures

## 2023-02-23 ENCOUNTER — CLINICAL SUPPORT (OUTPATIENT)
Dept: FAMILY MEDICINE CLINIC | Facility: CLINIC | Age: 78
End: 2023-02-23

## 2023-02-23 VITALS — SYSTOLIC BLOOD PRESSURE: 150 MMHG | DIASTOLIC BLOOD PRESSURE: 80 MMHG

## 2023-02-23 DIAGNOSIS — I10 PRIMARY HYPERTENSION: ICD-10-CM

## 2023-02-23 RX ORDER — AMLODIPINE BESYLATE 10 MG/1
10 TABLET ORAL DAILY
Qty: 30 TABLET | Refills: 2 | Status: SHIPPED | OUTPATIENT
Start: 2023-02-23

## 2023-02-23 NOTE — PROGRESS NOTES
Patient is here for a blood pressure check   Patient's BP reading today is 150/80   Patient reports taking her blood pressure medication daily (Amlodipine 5 mg)  BP reading communicated to Dr Anaya Williamson   As per Dr Anaya Williamson patient should increase her Amlodipine to10 mg a day   Patient may take 2 of the 5 mg Amlodipine tablets to total 10 mg until finished, then  new prescription  Patient understood directions

## 2023-02-24 ENCOUNTER — APPOINTMENT (OUTPATIENT)
Dept: LAB | Facility: CLINIC | Age: 78
End: 2023-02-24

## 2023-02-24 DIAGNOSIS — R19.7 DIARRHEA, UNSPECIFIED TYPE: ICD-10-CM

## 2023-02-25 LAB
CAMPYLOBACTER DNA SPEC NAA+PROBE: NORMAL
SALMONELLA DNA SPEC QL NAA+PROBE: NORMAL
SHIGA TOXIN STX GENE SPEC NAA+PROBE: NORMAL
SHIGELLA DNA SPEC QL NAA+PROBE: NORMAL

## 2023-02-26 ENCOUNTER — APPOINTMENT (OUTPATIENT)
Dept: LAB | Facility: CLINIC | Age: 78
End: 2023-02-26

## 2023-02-26 DIAGNOSIS — R73.03 PREDIABETES: ICD-10-CM

## 2023-02-26 DIAGNOSIS — E78.49 OTHER HYPERLIPIDEMIA: ICD-10-CM

## 2023-02-26 DIAGNOSIS — Z13.0 SCREENING FOR DEFICIENCY ANEMIA: ICD-10-CM

## 2023-02-26 DIAGNOSIS — I10 PRIMARY HYPERTENSION: ICD-10-CM

## 2023-02-26 DIAGNOSIS — E78.2 MIXED HYPERLIPIDEMIA: ICD-10-CM

## 2023-02-26 DIAGNOSIS — E55.9 VITAMIN D INSUFFICIENCY: ICD-10-CM

## 2023-02-26 LAB
25(OH)D3 SERPL-MCNC: 61.8 NG/ML (ref 30–100)
ALBUMIN SERPL BCP-MCNC: 3.8 G/DL (ref 3.5–5)
ALP SERPL-CCNC: 74 U/L (ref 46–116)
ALT SERPL W P-5'-P-CCNC: 28 U/L (ref 12–78)
ANION GAP SERPL CALCULATED.3IONS-SCNC: 4 MMOL/L (ref 4–13)
AST SERPL W P-5'-P-CCNC: 21 U/L (ref 5–45)
BASOPHILS # BLD AUTO: 0.04 THOUSANDS/ÂΜL (ref 0–0.1)
BASOPHILS NFR BLD AUTO: 1 % (ref 0–1)
BILIRUB SERPL-MCNC: 0.51 MG/DL (ref 0.2–1)
BUN SERPL-MCNC: 9 MG/DL (ref 5–25)
CALCIUM SERPL-MCNC: 9.4 MG/DL (ref 8.3–10.1)
CHLORIDE SERPL-SCNC: 107 MMOL/L (ref 96–108)
CHOLEST SERPL-MCNC: 147 MG/DL
CO2 SERPL-SCNC: 26 MMOL/L (ref 21–32)
CREAT SERPL-MCNC: 0.72 MG/DL (ref 0.6–1.3)
EOSINOPHIL # BLD AUTO: 0.27 THOUSAND/ÂΜL (ref 0–0.61)
EOSINOPHIL NFR BLD AUTO: 4 % (ref 0–6)
ERYTHROCYTE [DISTWIDTH] IN BLOOD BY AUTOMATED COUNT: 12.7 % (ref 11.6–15.1)
EST. AVERAGE GLUCOSE BLD GHB EST-MCNC: 131 MG/DL
GFR SERPL CREATININE-BSD FRML MDRD: 81 ML/MIN/1.73SQ M
GLUCOSE P FAST SERPL-MCNC: 121 MG/DL (ref 65–99)
HBA1C MFR BLD: 6.2 %
HCT VFR BLD AUTO: 43.8 % (ref 34.8–46.1)
HDLC SERPL-MCNC: 53 MG/DL
HGB BLD-MCNC: 13.3 G/DL (ref 11.5–15.4)
IMM GRANULOCYTES # BLD AUTO: 0.02 THOUSAND/UL (ref 0–0.2)
IMM GRANULOCYTES NFR BLD AUTO: 0 % (ref 0–2)
LDLC SERPL CALC-MCNC: 63 MG/DL (ref 0–100)
LYMPHOCYTES # BLD AUTO: 2.86 THOUSANDS/ÂΜL (ref 0.6–4.47)
LYMPHOCYTES NFR BLD AUTO: 37 % (ref 14–44)
MCH RBC QN AUTO: 29.6 PG (ref 26.8–34.3)
MCHC RBC AUTO-ENTMCNC: 30.4 G/DL (ref 31.4–37.4)
MCV RBC AUTO: 98 FL (ref 82–98)
MONOCYTES # BLD AUTO: 0.48 THOUSAND/ÂΜL (ref 0.17–1.22)
MONOCYTES NFR BLD AUTO: 6 % (ref 4–12)
NEUTROPHILS # BLD AUTO: 3.98 THOUSANDS/ÂΜL (ref 1.85–7.62)
NEUTS SEG NFR BLD AUTO: 52 % (ref 43–75)
NONHDLC SERPL-MCNC: 94 MG/DL
NRBC BLD AUTO-RTO: 0 /100 WBCS
PLATELET # BLD AUTO: 296 THOUSANDS/UL (ref 149–390)
PMV BLD AUTO: 10.3 FL (ref 8.9–12.7)
POTASSIUM SERPL-SCNC: 4 MMOL/L (ref 3.5–5.3)
PROT SERPL-MCNC: 7.7 G/DL (ref 6.4–8.4)
RBC # BLD AUTO: 4.49 MILLION/UL (ref 3.81–5.12)
SODIUM SERPL-SCNC: 137 MMOL/L (ref 135–147)
TRIGL SERPL-MCNC: 156 MG/DL
WBC # BLD AUTO: 7.65 THOUSAND/UL (ref 4.31–10.16)

## 2023-02-27 LAB — C DIFF TOX GENS STL QL NAA+PROBE: NEGATIVE

## 2023-03-07 ENCOUNTER — TELEPHONE (OUTPATIENT)
Dept: FAMILY MEDICINE CLINIC | Facility: CLINIC | Age: 78
End: 2023-03-07

## 2023-03-07 NOTE — TELEPHONE ENCOUNTER
Patient continues with diarrhea when she does not take the immodium she stopped eating dairy products for more than 2 weeks   She feels it's a bacteria in her stomache she asked if maybe an antibiotic will help or other meds to help control the diarrhea

## 2023-03-08 DIAGNOSIS — A09 DIARRHEA OF INFECTIOUS ORIGIN: Primary | ICD-10-CM

## 2023-03-08 RX ORDER — AZITHROMYCIN 500 MG/1
1000 TABLET, FILM COATED ORAL DAILY
Qty: 6 TABLET | Refills: 0 | Status: SHIPPED | OUTPATIENT
Start: 2023-03-08 | End: 2023-03-11

## 2023-03-09 DIAGNOSIS — I10 PRIMARY HYPERTENSION: ICD-10-CM

## 2023-03-09 RX ORDER — AMLODIPINE BESYLATE 10 MG/1
TABLET ORAL
Qty: 90 TABLET | Refills: 1 | Status: SHIPPED | OUTPATIENT
Start: 2023-03-09

## 2023-04-17 PROBLEM — Z00.00 ENCOUNTER FOR ANNUAL WELLNESS EXAM IN MEDICARE PATIENT: Status: RESOLVED | Noted: 2021-12-01 | Resolved: 2023-04-17

## 2023-05-30 DIAGNOSIS — M25.562 CHRONIC PAIN OF BOTH KNEES: ICD-10-CM

## 2023-05-30 DIAGNOSIS — E78.49 OTHER HYPERLIPIDEMIA: ICD-10-CM

## 2023-05-30 DIAGNOSIS — G89.29 CHRONIC PAIN OF BOTH KNEES: ICD-10-CM

## 2023-05-30 DIAGNOSIS — M25.561 CHRONIC PAIN OF BOTH KNEES: ICD-10-CM

## 2023-05-30 RX ORDER — ATORVASTATIN CALCIUM 40 MG/1
40 TABLET, FILM COATED ORAL DAILY
Qty: 90 TABLET | Refills: 1 | Status: SHIPPED | OUTPATIENT
Start: 2023-05-30

## 2023-08-17 ENCOUNTER — OFFICE VISIT (OUTPATIENT)
Dept: FAMILY MEDICINE CLINIC | Facility: CLINIC | Age: 78
End: 2023-08-17
Payer: COMMERCIAL

## 2023-08-17 VITALS
SYSTOLIC BLOOD PRESSURE: 148 MMHG | HEIGHT: 62 IN | WEIGHT: 131.4 LBS | TEMPERATURE: 97.6 F | DIASTOLIC BLOOD PRESSURE: 74 MMHG | HEART RATE: 95 BPM | OXYGEN SATURATION: 97 % | RESPIRATION RATE: 16 BRPM | BODY MASS INDEX: 24.18 KG/M2

## 2023-08-17 DIAGNOSIS — E78.2 MIXED HYPERLIPIDEMIA: ICD-10-CM

## 2023-08-17 DIAGNOSIS — Z78.0 POST-MENOPAUSAL: ICD-10-CM

## 2023-08-17 DIAGNOSIS — I10 PRIMARY HYPERTENSION: ICD-10-CM

## 2023-08-17 DIAGNOSIS — E55.9 VITAMIN D INSUFFICIENCY: ICD-10-CM

## 2023-08-17 DIAGNOSIS — Z00.00 ANNUAL PHYSICAL EXAM: Primary | ICD-10-CM

## 2023-08-17 DIAGNOSIS — R73.03 PREDIABETES: ICD-10-CM

## 2023-08-17 DIAGNOSIS — Z13.820 SCREENING FOR OSTEOPOROSIS: ICD-10-CM

## 2023-08-17 DIAGNOSIS — Z12.31 ENCOUNTER FOR SCREENING MAMMOGRAM FOR MALIGNANT NEOPLASM OF BREAST: ICD-10-CM

## 2023-08-17 PROCEDURE — 99397 PER PM REEVAL EST PAT 65+ YR: CPT | Performed by: FAMILY MEDICINE

## 2023-08-17 RX ORDER — NIFEDIPINE 30 MG/1
30 TABLET, FILM COATED, EXTENDED RELEASE ORAL DAILY
Qty: 90 TABLET | Refills: 3 | Status: SHIPPED | OUTPATIENT
Start: 2023-08-17 | End: 2024-08-11

## 2023-08-17 NOTE — ASSESSMENT & PLAN NOTE
Colonoscopy never done. Reminded multiple times. Pt refuses. Mammogram UTD and next appt is scheduled. Pap UTD. Dexa due. Pt is a never smoker and does not need lung cancer screening.

## 2023-08-17 NOTE — ASSESSMENT & PLAN NOTE
• uncontrolled  • Blood pressure goal per JNC VIII would be <150/90  • Switch amlodipine 10 to nifedipine  • Restrict daily salt intake up to 2.4 g  • Advised to walk 30 minutes a day 5 times a week and practice stress relieving measures

## 2023-08-17 NOTE — PROGRESS NOTES
[id number] [facility]    NAME: Berry Ramirez  AGE: 69 y.o. SEX: female  : 1945     DATE: 2023     Assessment and Plan:     Problem List Items Addressed This Visit        Cardiovascular and Mediastinum    Primary hypertension     uncontrolled  Blood pressure goal per JNC VIII would be <150/90  Switch amlodipine 10 to nifedipine  Restrict daily salt intake up to 2.4 g  Advised to walk 30 minutes a day 5 times a week and practice stress relieving measures           Relevant Medications    NIFEdipine ER (ADALAT CC) 30 MG 24 hr tablet    Other Relevant Orders    Comprehensive metabolic panel       Other    Hyperlipidemia     On lipitor 40. Recheck. Relevant Orders    Lipid panel    Prediabetes    Relevant Orders    Hemoglobin A1C    Vitamin D insufficiency    Relevant Orders    Vitamin D 25 hydroxy    Annual physical exam - Primary     Colonoscopy never done. Reminded multiple times. Pt refuses. Mammogram UTD and next appt is scheduled. Pap UTD. Dexa due. Pt is a never smoker and does not need lung cancer screening. Other Visit Diagnoses     Encounter for screening mammogram for malignant neoplasm of breast        Relevant Orders    Mammo screening bilateral w 3d & cad    Screening for osteoporosis        Relevant Orders    DXA bone density spine hip and pelvis    Post-menopausal        Relevant Orders    DXA bone density spine hip and pelvis          Immunizations and preventive care screenings were discussed with patient today. Appropriate education was printed on patient's after visit summary. Counseling:  Alcohol/drug use: discussed moderation in alcohol intake, the recommendations for healthy alcohol use, and avoidance of illicit drug use. Dental Health: discussed importance of regular tooth brushing, flossing, and dental visits.   Exercise: the importance of regular exercise/physical activity was discussed. Recommend exercise 3-5 times per week for at least 30 minutes. Return in 3 months (on 11/17/2023) for bp recheck . Chief Complaint:     Chief Complaint   Patient presents with   • 6 month follow up       History of Present Illness:     Adult Annual Physical   Patient here for a comprehensive physical exam. Pt with a ho HTN, prediabetes, osteopenia, HLD, asthma, GERD. The patient reports no problems. Labs reviewed which show stable kidney/liver funciton, lipids well controlled except for mildly elevated tgs, vit d 61.8, cbc WNL, a1c 6.2. Diet and Physical Activity  Diet/Nutrition: well balanced diet. Exercise: walking. Depression Screening  PHQ-2/9 Depression Screening    Little interest or pleasure in doing things: 0 - not at all  Feeling down, depressed, or hopeless: 0 - not at all  PHQ-2 Score: 0  PHQ-2 Interpretation: Negative depression screen       General Health  Sleep: sleeps well. Hearing: no issues. Vision: goes for regular eye exams. Dental: regular dental visits. /GYN Health  Patient is: postmenopausal       Review of Systems:     Review of Systems   Constitutional: Negative for fever and unexpected weight change. HENT: Negative for ear pain, sore throat and trouble swallowing. Eyes: Negative for pain and visual disturbance. Respiratory: Negative for cough, chest tightness, shortness of breath and wheezing. Cardiovascular: Negative for chest pain. Gastrointestinal: Negative for abdominal distention, abdominal pain, blood in stool, constipation, diarrhea, nausea and vomiting. Endocrine: Negative for polydipsia and polyuria. Genitourinary: Negative for dysuria and hematuria. Musculoskeletal: Negative for back pain and myalgias. Skin: Negative for rash. Neurological: Negative for syncope and headaches. Psychiatric/Behavioral: Negative for suicidal ideas.       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   Tobacco Use   • Smoking status: Never   • Smokeless tobacco: Never   Substance and Sexual Activity   • Alcohol use: No   • Drug use: No   • Sexual activity: Not Currently   Other Topics Concern   • None   Social History Narrative   • None     Social Determinants of Health     Financial Resource Strain: Low Risk  (2023)    Overall Financial Resource Strain (CARDIA)    • Difficulty of Paying Living Expenses: Not hard at all   Food Insecurity: No Food Insecurity (2019)    Hunger Vital Sign    • Worried About Running Out of Food in the Last Year: Never true    • Ran Out of Food in the Last Year: Never true   Transportation Needs: No Transportation Needs (2023)    PRAPARE - Transportation    • Lack of Transportation (Medical): No    • Lack of Transportation (Non-Medical):  No   Physical Activity: Inactive (2019)    Exercise Vital Sign    • Days of Exercise per Week: 0 days    • Minutes of Exercise per Session: 0 min   Stress: No Stress Concern Present (2019)    [facility]    • Feeling of Stress : Not at all   Social Connections: Unknown (2019)    Social Connection and Isolation Panel [NHANES]    • Frequency of Communication with Friends and Family: Patient refused    • Frequency of Social Gatherings with Friends and Family: Patient refused    • Attends Tenriism Services: Patient refused    • Active Member of Clubs or Organizations: Patient refused    • Attends Club or Organization Meetings: Patient refused    • Marital Status: Patient refused   Intimate Partner Violence: Unknown (11/20/2019)    Humiliation, Afraid, Rape, and Kick questionnaire    • Fear of Current or Ex-Partner: Patient refused    • Emotionally Abused: Patient refused    • Physically Abused: Patient refused    • Sexually Abused: Patient refused   Housing Stability: Not on file      Family History:     Family History   Problem Relation Age of Onset   • Thyroid cancer Mother    • Diabetes Father         Diabetes Mellitus   • No Known Problems Daughter    • No Known Problems Daughter    • No Known Problems Daughter    • No Known Problems Maternal Grandmother    • No Known Problems Maternal Grandfather    • No Known Problems Paternal Grandmother    • No Known Problems Paternal Grandfather    • No Known Problems Maternal Aunt       Current Medications:     Current Outpatient Medications   Medication Sig Dispense Refill   • albuterol (Ventolin HFA) 90 mcg/act inhaler Inhale 2 puffs every 6 (six) hours as needed for wheezing or shortness of breath 1 Inhaler 1   • alendronate (FOSAMAX) 70 mg tablet Take 1 tablet (70 mg total) by mouth every 7 days 12 tablet 3   • atorvastatin (LIPITOR) 40 mg tablet Take 1 tablet (40 mg total) by mouth daily 90 tablet 1   • Calcium 500 MG tablet Take 1 tablet by mouth daily     • capsicum (Arthritis Pain Relieving) 0.075 % topical cream Apply topically 3 (three) times a day To the affected area 120 g 5   • cholecalciferol (VITAMIN D3) 1,000 units tablet Take 1 tablet by mouth daily     • fluticasone (Flovent Diskus) 250 mcg/actuation diskus inhaler Inhale 2 puffs 2 (two) times a day Rinse mouth after use.  60 blister 5   • magnesium gluconate (MAGONATE) 500 mg tablet Take 500 mg by mouth 2 (two) times a day     • NIFEdipine ER (ADALAT CC) 30 MG 24 hr tablet Take 1 tablet (30 mg total) by mouth daily 90 tablet 3   • Omega-3 1000 MG CAPS Take by mouth       No current facility-administered medications for this visit. Allergies: Allergies   Allergen Reactions   • Eggs Or Egg-Derived Products - Food Allergy Diarrhea      Physical Exam:     /74 (BP Location: Left arm, Patient Position: Sitting, Cuff Size: Adult)   Pulse 95   Temp 97.6 °F (36.4 °C) (Temporal)   Resp 16   Ht 5' 2" (1.575 m)   Wt 59.6 kg (131 lb 6.4 oz)   LMP  (LMP Unknown)   SpO2 97%   BMI 24.03 kg/m²     Physical Exam  Constitutional:       Appearance: She is well-developed. HENT:      Head: Normocephalic and atraumatic. Eyes:      General: No scleral icterus. Conjunctiva/sclera: Conjunctivae normal.      Pupils: Pupils are equal, round, and reactive to light. Cardiovascular:      Rate and Rhythm: Normal rate and regular rhythm. Heart sounds: Normal heart sounds. No murmur heard. No friction rub. No gallop. Pulmonary:      Effort: Pulmonary effort is normal. No respiratory distress. Breath sounds: No wheezing or rales. Chest:      Chest wall: No tenderness. Abdominal:      General: Bowel sounds are normal. There is no distension. Palpations: Abdomen is soft. There is no mass. Tenderness: There is no abdominal tenderness. Musculoskeletal:         General: Normal range of motion. Cervical back: Normal range of motion. Skin:     General: Skin is warm and dry. Findings: No rash. Neurological:      Mental Status: She is alert and oriented to person, place, and time. Cranial Nerves: No cranial nerve deficit.           Renato Gillis MD  [facility]

## 2023-11-22 DIAGNOSIS — M85.80 OSTEOPENIA, UNSPECIFIED LOCATION: ICD-10-CM

## 2023-11-24 RX ORDER — ALENDRONATE SODIUM 70 MG/1
70 TABLET ORAL
Qty: 12 TABLET | Refills: 3 | Status: SHIPPED | OUTPATIENT
Start: 2023-11-24

## 2023-11-30 ENCOUNTER — OFFICE VISIT (OUTPATIENT)
Dept: FAMILY MEDICINE CLINIC | Facility: CLINIC | Age: 78
End: 2023-11-30
Payer: COMMERCIAL

## 2023-11-30 VITALS
DIASTOLIC BLOOD PRESSURE: 70 MMHG | HEART RATE: 95 BPM | BODY MASS INDEX: 24.8 KG/M2 | HEIGHT: 62 IN | RESPIRATION RATE: 16 BRPM | TEMPERATURE: 97.1 F | WEIGHT: 134.8 LBS | SYSTOLIC BLOOD PRESSURE: 151 MMHG | OXYGEN SATURATION: 97 %

## 2023-11-30 DIAGNOSIS — M25.561 CHRONIC PAIN OF BOTH KNEES: ICD-10-CM

## 2023-11-30 DIAGNOSIS — R73.03 PREDIABETES: ICD-10-CM

## 2023-11-30 DIAGNOSIS — M81.0 OSTEOPOROSIS WITHOUT CURRENT PATHOLOGICAL FRACTURE, UNSPECIFIED OSTEOPOROSIS TYPE: ICD-10-CM

## 2023-11-30 DIAGNOSIS — M25.562 CHRONIC PAIN OF BOTH KNEES: ICD-10-CM

## 2023-11-30 DIAGNOSIS — G89.29 CHRONIC PAIN OF BOTH KNEES: ICD-10-CM

## 2023-11-30 DIAGNOSIS — E78.2 MIXED HYPERLIPIDEMIA: ICD-10-CM

## 2023-11-30 DIAGNOSIS — J45.30 MILD PERSISTENT ASTHMA WITHOUT COMPLICATION: ICD-10-CM

## 2023-11-30 DIAGNOSIS — J45.909 UNCOMPLICATED ASTHMA, UNSPECIFIED ASTHMA SEVERITY, UNSPECIFIED WHETHER PERSISTENT: ICD-10-CM

## 2023-11-30 DIAGNOSIS — M19.041 PRIMARY OSTEOARTHRITIS OF RIGHT HAND: ICD-10-CM

## 2023-11-30 DIAGNOSIS — Z13.1 SCREENING FOR DIABETES MELLITUS: ICD-10-CM

## 2023-11-30 DIAGNOSIS — Z12.4 CERVICAL CANCER SCREENING: ICD-10-CM

## 2023-11-30 DIAGNOSIS — Z23 NEED FOR VACCINATION: ICD-10-CM

## 2023-11-30 DIAGNOSIS — I10 PRIMARY HYPERTENSION: Primary | ICD-10-CM

## 2023-11-30 DIAGNOSIS — Z23 ENCOUNTER FOR IMMUNIZATION: ICD-10-CM

## 2023-11-30 DIAGNOSIS — Z12.11 SCREEN FOR COLON CANCER: ICD-10-CM

## 2023-11-30 PROCEDURE — 90471 IMMUNIZATION ADMIN: CPT

## 2023-11-30 PROCEDURE — 90662 IIV NO PRSV INCREASED AG IM: CPT

## 2023-11-30 PROCEDURE — 99214 OFFICE O/P EST MOD 30 MIN: CPT | Performed by: FAMILY MEDICINE

## 2023-11-30 RX ORDER — ZOSTER VACCINE RECOMBINANT, ADJUVANTED 50 MCG/0.5
0.5 KIT INTRAMUSCULAR ONCE
Qty: 1 EACH | Refills: 1 | Status: SHIPPED | OUTPATIENT
Start: 2023-11-30 | End: 2023-11-30

## 2023-11-30 RX ORDER — MULTIVIT WITH MINERALS/LUTEIN
1000 TABLET ORAL DAILY
COMMUNITY

## 2023-11-30 RX ORDER — CAPSAICIN 0.75 MG/G
CREAM TOPICAL 3 TIMES DAILY
Qty: 120 G | Refills: 5 | Status: SHIPPED | OUTPATIENT
Start: 2023-11-30

## 2023-11-30 NOTE — PROGRESS NOTES
Name: Johnny Abel      : 1945      MRN: [id number]  Encounter Provider: Srinivas Vargas MD  Encounter Date: 2023   Encounter department: [address] [place] [address]     1. Primary hypertension  -     Comprehensive metabolic panel; Future  -     TSH, 3rd generation with Free T4 reflex; Future  -     UA w Reflex to Microscopic w Reflex to Culture; Future; Expected date: 2023    2. Chronic pain of both knees  -     CBC and differential; Future  -     XR knee bilateral ap standing; Future; Expected date: 2023  -     THEODORE Screen w/ Reflex to Titer/Pattern; Future  -     capsicum (Arthritis Pain Relieving) 0.075 % topical cream; Apply topically 3 (three) times a day To the affected area    3. Chronic pain of both knees  Comments:  Use creams. Get xray and fu for a bl knee injection. Orders:  -     CBC and differential; Future  -     XR knee bilateral ap standing; Future; Expected date: 2023  -     THEODORE Screen w/ Reflex to Titer/Pattern; Future  -     capsicum (Arthritis Pain Relieving) 0.075 % topical cream; Apply topically 3 (three) times a day To the affected area    4. Primary osteoarthritis of right hand    5. Mixed hyperlipidemia  -     Lipid panel; Future    6. Prediabetes  -     Hemoglobin A1C; Future  -     Albumin / creatinine urine ratio; Future    7. Mild persistent asthma without complication  -     CBC and differential; Future    8. Uncomplicated asthma, unspecified asthma severity, unspecified whether persistent  -     fluticasone (Flovent Diskus) 250 mcg/actuation diskus inhaler; Inhale 2 puffs 2 (two) times a day Rinse mouth after use. 9. Osteoporosis without current pathological fracture, unspecified osteoporosis type  -     Vitamin D 25 hydroxy; Future  -     DXA bone density spine hip and pelvis; Future; Expected date: 2023    10. Cervical cancer screening  -     Ambulatory Referral to Gynecology; Future    11.  Screening for diabetes mellitus  -     Mammo diagnostic bilateral w cad; Future; Expected date: 11/30/2023    12. Screen for colon cancer  -     Ambulatory Referral to Gastroenterology; Future    13. Encounter for immunization  -     influenza vaccine, high-dose, PF 0.7 mL (FLUZONE HIGH-DOSE)    14. Need for vaccination  -     Zoster Vac Recomb Adjuvanted (Shingrix) 50 MCG/0.5ML SUSR; Inject 0.5 mL into a muscle once for 1 dose Repeat dose in 2 to 6 months      Discussing all diagnoses above with patient patient was advised that this time she does follow-up with the blood work and all the studies that I ordered for her above. Patient will use Tylenol arthritis plus or minus Advil as needed for her knee pain. Patient can still continue using her capsacian cream.  Patient's Flovent was refilled for her asthma which is mildly persistent without complications. She will continue using albuterol inhaler as needed that she says she needs rarely. Patient is is advised to see the gynecologist for Pap smear. Also GI for colonoscopy. Patient received the flu vaccine in the last today and will get the shingles vaccine at the pharmacy. RTO 6 weeks and do the blood work before. Subjective      67-year-old female here for follow-up. Past complains of both knees and right hand which are not new. Patient also has ordered her bone density or mammogram or colonoscopy but patient has not gotten them done. Patient is amenable to getting the flu vaccine with us today. Patient does get nervous here and her blood pressure goes up. No acute cardiovascular or pulmonary or neuro symptoms. Of note patient says she still works mainly standing. Review of Systems   Constitutional:  Negative for chills, fatigue and fever. HENT:  Negative for congestion and sore throat. Eyes:  Negative for visual disturbance. Respiratory:  Negative for cough and shortness of breath. Cardiovascular:  Negative for chest pain and palpitations. Gastrointestinal:  Negative for abdominal pain, blood in stool, constipation, diarrhea, nausea and vomiting. Genitourinary:  Negative for dysuria and hematuria. Musculoskeletal:  Positive for arthralgias. Skin:  Negative for rash. Neurological:  Negative for dizziness and headaches. Psychiatric/Behavioral:  Positive for sleep disturbance. Negative for dysphoric mood. The patient is not nervous/anxious. Current Outpatient Medications on File Prior to Visit   Medication Sig   • albuterol (Ventolin HFA) 90 mcg/act inhaler Inhale 2 puffs every 6 (six) hours as needed for wheezing or shortness of breath   • alendronate (FOSAMAX) 70 mg tablet TAKE 1 TABLET (70 MG TOTAL) BY MOUTH EVERY 7 DAYS   • Ascorbic Acid (vitamin C) 1000 MG tablet Take 1,000 mg by mouth daily   • atorvastatin (LIPITOR) 40 mg tablet Take 1 tablet (40 mg total) by mouth daily   • Calcium 500 MG tablet Take 1 tablet by mouth daily   • cholecalciferol (VITAMIN D3) 1,000 units tablet Take 1 tablet by mouth daily   • magnesium gluconate (MAGONATE) 500 mg tablet Take 500 mg by mouth 2 (two) times a day   • NIFEdipine ER (ADALAT CC) 30 MG 24 hr tablet Take 1 tablet (30 mg total) by mouth daily   • Omega-3 1000 MG CAPS Take by mouth   • [DISCONTINUED] capsicum (Arthritis Pain Relieving) 0.075 % topical cream Apply topically 3 (three) times a day To the affected area   • [DISCONTINUED] fluticasone (Flovent Diskus) 250 mcg/actuation diskus inhaler Inhale 2 puffs 2 (two) times a day Rinse mouth after use. Objective     /70 (BP Location: Left arm, Patient Position: Sitting, Cuff Size: Adult)   Pulse 95   Temp (!) 97.1 °F (36.2 °C) (Temporal)   Resp 16   Ht 5' 2" (1.575 m)   Wt 61.1 kg (134 lb 12.8 oz)   LMP  (LMP Unknown)   SpO2 97%   BMI 24.66 kg/m²     Physical Exam  Vitals reviewed. Constitutional:       General: She is not in acute distress. Appearance: Normal appearance. She is normal weight.  She is not ill-appearing. HENT:      Head: Normocephalic and atraumatic. Mouth/Throat:      Mouth: Mucous membranes are moist.      Pharynx: Oropharynx is clear. Eyes:      Extraocular Movements: Extraocular movements intact. Conjunctiva/sclera: Conjunctivae normal.   Neck:      Vascular: No carotid bruit. Cardiovascular:      Rate and Rhythm: Normal rate and regular rhythm. Pulmonary:      Effort: Pulmonary effort is normal.      Breath sounds: Normal breath sounds. Abdominal:      General: Bowel sounds are normal.      Palpations: Abdomen is soft. Tenderness: There is no abdominal tenderness. There is no right CVA tenderness or left CVA tenderness. Musculoskeletal:      Cervical back: Neck supple. Right lower leg: No edema. Left lower leg: No edema. Comments: No calf tenderness bilateral   Lymphadenopathy:      Cervical: No cervical adenopathy. Skin:     General: Skin is warm. Findings: No rash. Neurological:      General: No focal deficit present. Mental Status: She is alert and oriented to person, place, and time. Cranial Nerves: No cranial nerve deficit.    Psychiatric:         Mood and Affect: Mood normal.         Behavior: Behavior normal.       Heraclio Ivy MD

## 2023-12-13 ENCOUNTER — TELEPHONE (OUTPATIENT)
Dept: FAMILY MEDICINE CLINIC | Facility: CLINIC | Age: 78
End: 2023-12-13

## 2023-12-13 DIAGNOSIS — U07.1 COVID-19: Primary | ICD-10-CM

## 2023-12-13 RX ORDER — NIRMATRELVIR AND RITONAVIR 300-100 MG
3 KIT ORAL 2 TIMES DAILY
Qty: 30 TABLET | Refills: 0 | Status: SHIPPED | OUTPATIENT
Start: 2023-12-13 | End: 2023-12-18

## 2023-12-13 NOTE — TELEPHONE ENCOUNTER
PATIENT CAME OUT COVID POSITIVE AND ASKED IF PAXLOVID CAN BE CALLED INTO PHARMACY   VERY TIRED AND HEAD FEELS CLOUDY SHE IS TAKING DAYQUIL AND NYQUIL FOR 2 DAYS   PLEASE ADVISE.

## 2023-12-13 NOTE — TELEPHONE ENCOUNTER
I called the patient myself left her voice message with details less I wrote a note so if she calls back and you want to read this to her and or you may talk to her I will be happy to and I did tell her to call us back tomorrow between 8 and 4 as well. Thank you. And yes I did call in the Paxlovid for her and she is to not take her cholesterol medication for the 5 days that she is on the Paxlovid she can resume it afterwards if she feels fine, thank you.

## 2023-12-13 NOTE — TELEPHONE ENCOUNTER
Spoke to patient gave her detailed message she understood and will call tomorrow to let us know how she is feeling

## 2023-12-13 NOTE — PROGRESS NOTES
Patient called on her cell phone regarding her [phone] being positive today. Per her request patient was called in [facility]. Patient will hold her Lipitor for the 5 days that she is on Paxlovid. Patient advised to rest, supportive care with Tylenol and Advil as needed. Homemade chicken soup. Lots of fluids water etc.  Taking vitamin C and zinc salt water gargles and vitamin D is in Andriette Board as well. Patient was advised if she is not feeling well and or she has chest pain palpitations or just does not feel comfortable with any of her symptoms and patient to report to the emergency room or call 911.   Patient was also asked to call us back this afternoon and or tomorrow with any questions concerns etc.

## 2023-12-26 DIAGNOSIS — G89.29 CHRONIC PAIN OF BOTH KNEES: ICD-10-CM

## 2023-12-26 DIAGNOSIS — M25.561 CHRONIC PAIN OF BOTH KNEES: ICD-10-CM

## 2023-12-26 DIAGNOSIS — M25.562 CHRONIC PAIN OF BOTH KNEES: ICD-10-CM

## 2023-12-27 RX ORDER — CAPSAICIN 0.75 MG/G
CREAM TOPICAL 3 TIMES DAILY
Qty: 360 G | Refills: 2 | Status: SHIPPED | OUTPATIENT
Start: 2023-12-27

## 2023-12-31 DIAGNOSIS — J45.909 UNCOMPLICATED ASTHMA, UNSPECIFIED ASTHMA SEVERITY, UNSPECIFIED WHETHER PERSISTENT: ICD-10-CM

## 2024-01-02 RX ORDER — FLUTICASONE PROPIONATE 250 UG/1
POWDER, METERED RESPIRATORY (INHALATION)
Qty: 180 EACH | Refills: 2 | Status: SHIPPED | OUTPATIENT
Start: 2024-01-02

## 2024-03-02 ENCOUNTER — APPOINTMENT (OUTPATIENT)
Dept: LAB | Facility: CLINIC | Age: 79
End: 2024-03-02
Payer: COMMERCIAL

## 2024-03-02 DIAGNOSIS — E78.2 MIXED HYPERLIPIDEMIA: ICD-10-CM

## 2024-03-02 DIAGNOSIS — R73.03 PREDIABETES: ICD-10-CM

## 2024-03-02 DIAGNOSIS — M81.0 OSTEOPOROSIS WITHOUT CURRENT PATHOLOGICAL FRACTURE, UNSPECIFIED OSTEOPOROSIS TYPE: ICD-10-CM

## 2024-03-02 DIAGNOSIS — M25.561 CHRONIC PAIN OF BOTH KNEES: ICD-10-CM

## 2024-03-02 DIAGNOSIS — I10 PRIMARY HYPERTENSION: ICD-10-CM

## 2024-03-02 DIAGNOSIS — G89.29 CHRONIC PAIN OF BOTH KNEES: ICD-10-CM

## 2024-03-02 DIAGNOSIS — J45.30 MILD PERSISTENT ASTHMA WITHOUT COMPLICATION: ICD-10-CM

## 2024-03-02 DIAGNOSIS — M25.562 CHRONIC PAIN OF BOTH KNEES: ICD-10-CM

## 2024-03-02 DIAGNOSIS — E55.9 VITAMIN D INSUFFICIENCY: ICD-10-CM

## 2024-03-02 LAB
25(OH)D3 SERPL-MCNC: 43.2 NG/ML (ref 30–100)
ALBUMIN SERPL BCP-MCNC: 4.2 G/DL (ref 3.5–5)
ALP SERPL-CCNC: 65 U/L (ref 34–104)
ALT SERPL W P-5'-P-CCNC: 19 U/L (ref 7–52)
ANA SER QL IA: NEGATIVE
ANION GAP SERPL CALCULATED.3IONS-SCNC: 10 MMOL/L
AST SERPL W P-5'-P-CCNC: 21 U/L (ref 13–39)
BACTERIA UR QL AUTO: ABNORMAL /HPF
BASOPHILS # BLD AUTO: 0.04 THOUSANDS/ÂΜL (ref 0–0.1)
BASOPHILS NFR BLD AUTO: 1 % (ref 0–1)
BILIRUB SERPL-MCNC: 0.61 MG/DL (ref 0.2–1)
BILIRUB UR QL STRIP: NEGATIVE
BUN SERPL-MCNC: 17 MG/DL (ref 5–25)
CALCIUM SERPL-MCNC: 9.2 MG/DL (ref 8.4–10.2)
CHLORIDE SERPL-SCNC: 102 MMOL/L (ref 96–108)
CHOLEST SERPL-MCNC: 242 MG/DL
CLARITY UR: CLEAR
CO2 SERPL-SCNC: 26 MMOL/L (ref 21–32)
COLOR UR: ABNORMAL
CREAT SERPL-MCNC: 0.81 MG/DL (ref 0.6–1.3)
CREAT UR-MCNC: 136.2 MG/DL
EOSINOPHIL # BLD AUTO: 0.11 THOUSAND/ÂΜL (ref 0–0.61)
EOSINOPHIL NFR BLD AUTO: 2 % (ref 0–6)
ERYTHROCYTE [DISTWIDTH] IN BLOOD BY AUTOMATED COUNT: 13 % (ref 11.6–15.1)
EST. AVERAGE GLUCOSE BLD GHB EST-MCNC: 131 MG/DL
GFR SERPL CREATININE-BSD FRML MDRD: 70 ML/MIN/1.73SQ M
GLUCOSE P FAST SERPL-MCNC: 112 MG/DL (ref 65–99)
GLUCOSE UR STRIP-MCNC: NEGATIVE MG/DL
HBA1C MFR BLD: 6.2 %
HCT VFR BLD AUTO: 42.8 % (ref 34.8–46.1)
HDLC SERPL-MCNC: 45 MG/DL
HGB BLD-MCNC: 13.1 G/DL (ref 11.5–15.4)
HGB UR QL STRIP.AUTO: NEGATIVE
IMM GRANULOCYTES # BLD AUTO: 0.02 THOUSAND/UL (ref 0–0.2)
IMM GRANULOCYTES NFR BLD AUTO: 0 % (ref 0–2)
KETONES UR STRIP-MCNC: NEGATIVE MG/DL
LDLC SERPL CALC-MCNC: 150 MG/DL (ref 0–100)
LEUKOCYTE ESTERASE UR QL STRIP: ABNORMAL
LYMPHOCYTES # BLD AUTO: 2.64 THOUSANDS/ÂΜL (ref 0.6–4.47)
LYMPHOCYTES NFR BLD AUTO: 42 % (ref 14–44)
MCH RBC QN AUTO: 30.2 PG (ref 26.8–34.3)
MCHC RBC AUTO-ENTMCNC: 30.6 G/DL (ref 31.4–37.4)
MCV RBC AUTO: 99 FL (ref 82–98)
MICROALBUMIN UR-MCNC: 21.9 MG/L
MICROALBUMIN/CREAT 24H UR: 16 MG/G CREATININE (ref 0–30)
MONOCYTES # BLD AUTO: 0.46 THOUSAND/ÂΜL (ref 0.17–1.22)
MONOCYTES NFR BLD AUTO: 7 % (ref 4–12)
MUCOUS THREADS UR QL AUTO: ABNORMAL
NEUTROPHILS # BLD AUTO: 2.97 THOUSANDS/ÂΜL (ref 1.85–7.62)
NEUTS SEG NFR BLD AUTO: 48 % (ref 43–75)
NITRITE UR QL STRIP: POSITIVE
NON-SQ EPI CELLS URNS QL MICRO: ABNORMAL /HPF
NONHDLC SERPL-MCNC: 197 MG/DL
NRBC BLD AUTO-RTO: 0 /100 WBCS
PH UR STRIP.AUTO: 6 [PH]
PLATELET # BLD AUTO: 349 THOUSANDS/UL (ref 149–390)
PMV BLD AUTO: 10.4 FL (ref 8.9–12.7)
POTASSIUM SERPL-SCNC: 4.2 MMOL/L (ref 3.5–5.3)
PROT SERPL-MCNC: 7.5 G/DL (ref 6.4–8.4)
PROT UR STRIP-MCNC: ABNORMAL MG/DL
RBC # BLD AUTO: 4.34 MILLION/UL (ref 3.81–5.12)
RBC #/AREA URNS AUTO: ABNORMAL /HPF
SODIUM SERPL-SCNC: 138 MMOL/L (ref 135–147)
SP GR UR STRIP.AUTO: 1.02 (ref 1–1.03)
TRIGL SERPL-MCNC: 233 MG/DL
TSH SERPL DL<=0.05 MIU/L-ACNC: 1.06 UIU/ML (ref 0.45–4.5)
UROBILINOGEN UR STRIP-ACNC: <2 MG/DL
WBC # BLD AUTO: 6.24 THOUSAND/UL (ref 4.31–10.16)
WBC #/AREA URNS AUTO: ABNORMAL /HPF

## 2024-03-02 PROCEDURE — 80053 COMPREHEN METABOLIC PANEL: CPT

## 2024-03-02 PROCEDURE — 36415 COLL VENOUS BLD VENIPUNCTURE: CPT

## 2024-03-02 PROCEDURE — 87186 SC STD MICRODIL/AGAR DIL: CPT

## 2024-03-02 PROCEDURE — 87077 CULTURE AEROBIC IDENTIFY: CPT

## 2024-03-02 PROCEDURE — 82306 VITAMIN D 25 HYDROXY: CPT

## 2024-03-02 PROCEDURE — 82570 ASSAY OF URINE CREATININE: CPT

## 2024-03-02 PROCEDURE — 86038 ANTINUCLEAR ANTIBODIES: CPT

## 2024-03-02 PROCEDURE — 83036 HEMOGLOBIN GLYCOSYLATED A1C: CPT

## 2024-03-02 PROCEDURE — 80061 LIPID PANEL: CPT

## 2024-03-02 PROCEDURE — 82043 UR ALBUMIN QUANTITATIVE: CPT

## 2024-03-02 PROCEDURE — 87086 URINE CULTURE/COLONY COUNT: CPT

## 2024-03-02 PROCEDURE — 84443 ASSAY THYROID STIM HORMONE: CPT

## 2024-03-02 PROCEDURE — 81001 URINALYSIS AUTO W/SCOPE: CPT

## 2024-03-02 PROCEDURE — 85025 COMPLETE CBC W/AUTO DIFF WBC: CPT

## 2024-03-04 LAB — BACTERIA UR CULT: ABNORMAL

## 2024-03-06 DIAGNOSIS — N30.00 ACUTE CYSTITIS WITHOUT HEMATURIA: Primary | ICD-10-CM

## 2024-03-06 RX ORDER — CIPROFLOXACIN 500 MG/1
500 TABLET, FILM COATED ORAL EVERY 12 HOURS SCHEDULED
Qty: 14 TABLET | Refills: 0 | Status: SHIPPED | OUTPATIENT
Start: 2024-03-06 | End: 2024-03-13

## 2024-04-30 ENCOUNTER — TELEPHONE (OUTPATIENT)
Dept: FAMILY MEDICINE CLINIC | Facility: CLINIC | Age: 79
End: 2024-04-30

## 2024-05-01 ENCOUNTER — OFFICE VISIT (OUTPATIENT)
Dept: FAMILY MEDICINE CLINIC | Facility: CLINIC | Age: 79
End: 2024-05-01
Payer: COMMERCIAL

## 2024-05-01 VITALS
WEIGHT: 135 LBS | RESPIRATION RATE: 16 BRPM | DIASTOLIC BLOOD PRESSURE: 78 MMHG | TEMPERATURE: 97.9 F | SYSTOLIC BLOOD PRESSURE: 169 MMHG | HEIGHT: 62 IN | HEART RATE: 86 BPM | OXYGEN SATURATION: 97 % | BODY MASS INDEX: 24.84 KG/M2

## 2024-05-01 DIAGNOSIS — I10 PRIMARY HYPERTENSION: Primary | ICD-10-CM

## 2024-05-01 DIAGNOSIS — E78.2 MIXED HYPERLIPIDEMIA: ICD-10-CM

## 2024-05-01 DIAGNOSIS — R73.03 PREDIABETES: ICD-10-CM

## 2024-05-01 DIAGNOSIS — N30.00 ACUTE CYSTITIS WITHOUT HEMATURIA: ICD-10-CM

## 2024-05-01 PROCEDURE — 1159F MED LIST DOCD IN RCRD: CPT | Performed by: FAMILY MEDICINE

## 2024-05-01 PROCEDURE — 99214 OFFICE O/P EST MOD 30 MIN: CPT | Performed by: FAMILY MEDICINE

## 2024-05-01 PROCEDURE — 3288F FALL RISK ASSESSMENT DOCD: CPT | Performed by: FAMILY MEDICINE

## 2024-05-01 PROCEDURE — 3078F DIAST BP <80 MM HG: CPT | Performed by: FAMILY MEDICINE

## 2024-05-01 PROCEDURE — 1160F RVW MEDS BY RX/DR IN RCRD: CPT | Performed by: FAMILY MEDICINE

## 2024-05-01 PROCEDURE — 3077F SYST BP >= 140 MM HG: CPT | Performed by: FAMILY MEDICINE

## 2024-05-01 PROCEDURE — 1101F PT FALLS ASSESS-DOCD LE1/YR: CPT | Performed by: FAMILY MEDICINE

## 2024-05-01 NOTE — PROGRESS NOTES
Name: Latrice Palacio      : 1945      MRN: [id number]  Encounter Provider: Subhash Leonard MD  Encounter Date: 2024   Encounter department: [facility]    Assessment & Plan     1. Primary hypertension  -     Comprehensive metabolic panel; Future    2. Mixed hyperlipidemia  -     Comprehensive metabolic panel; Future  -     Lipid panel; Future    3. Prediabetes  -     Comprehensive metabolic panel; Future  -     Hemoglobin A1C; Future    4. Acute cystitis without hematuria  -     UA w Reflex to Microscopic w Reflex to Culture; Future      Regarding her hypertension as patient is systolic BP is also elevated secondary to what I said in my HPI.  Patient has no acute symptoms.  Patient will continue taking her medication.  Patient is advised low-salt diet etc.  Regarding her hyperlipidemia patient's LFTs are normal.  Her total cholesterol went up to 42 from 147 from February last year, triglycerides went up to 33 from 156, HDL went down to 45 from 53 and her LDL went up to 150 from 63.  Patient strongly advised to cut down on starches and fats from her diet.  Continue her medication.  Will recheck labs again in 3-4 months.  Regarding her prediabetes her glucose was 112 and her A1c remained the same at 6.2 in March of this year in February of last year.  Discussed with patient to cut down on her starches and sweets and fats.  Exercise.  I will recheck the labs again in 3 to 4 months.  Regarding her UTI history, discussed with patient.  Patient had positive UA and positive culture and was called in an antibiotic but she never took it.  This is the beginning of March.  Patient currently has no acute symptoms of UTI.  Discussed with patient.  Patient will check her urine with a culture and sensitivity in a day or so as well.  RTO 4 months for her annual physical and do the blood work and urine before.  Patient can see me for anything else in between.        Tobacco Cessation  Counseling: Patient denies tobacco.        Subjective      79-year-old female here for her follow-up evaluation of her prediabetes her lipids.  Patient did blood work and urine in March.  At that time or now patient does not have any symptoms of UTI.  The patient does drink a lot of juices as per patient and not much water.  She does not hold her urine.  Patient denies tobacco.  Patient's systolic blood pressure is high secondary to patient feeling a little anxious but she has not done her bone density mammogram or her x-rays of both knees.  Patient denies any acute cardiovascular neurosymptoms of it though.      Review of Systems   Constitutional:  Negative for chills, fatigue, fever and unexpected weight change.   HENT:  Negative for congestion and sore throat.    Eyes:  Negative for visual disturbance.   Respiratory:  Negative for cough and shortness of breath.    Cardiovascular:  Negative for chest pain and palpitations.   Gastrointestinal:  Negative for abdominal pain, blood in stool, constipation, diarrhea, nausea and vomiting.   Genitourinary:  Negative for dysuria, flank pain and hematuria.   Musculoskeletal:  Positive for arthralgias.   Neurological:  Negative for dizziness and headaches.   Psychiatric/Behavioral:  Negative for dysphoric mood, self-injury and suicidal ideas. The patient is nervous/anxious.        Current Outpatient Medications on File Prior to Visit   Medication Sig   • albuterol (Ventolin HFA) 90 mcg/act inhaler Inhale 2 puffs every 6 (six) hours as needed for wheezing or shortness of breath   • Ascorbic Acid (vitamin C) 1000 MG tablet Take 1,000 mg by mouth daily   • atorvastatin (LIPITOR) 40 mg tablet Take 1 tablet (40 mg total) by mouth daily   • Calcium 500 MG tablet Take 1 tablet by mouth daily   • cholecalciferol (VITAMIN D3) 1,000 units tablet Take 1 tablet by mouth daily   • magnesium gluconate (MAGONATE) 500 mg tablet Take 500 mg by mouth 2 (two) times a day   • Omega-3 1000 MG  "CAPS Take by mouth   • alendronate (FOSAMAX) 70 mg tablet TAKE 1 TABLET (70 MG TOTAL) BY MOUTH EVERY 7 DAYS (Patient not taking: Reported on 5/1/2024)   • capsicum (ZOSTRIX) 0.075 % topical cream APPLY TOPICALLY 3 (THREE) TIMES A DAY TO THE AFFECTED AREA (Patient not taking: Reported on 5/1/2024)   • Flovent Diskus 250 MCG/ACT diskus inhaler INHALE 2 PUFFS BY MOUTH 2 TIMES A DAY RINSE MOUTH AFTER USE. (Patient not taking: Reported on 5/1/2024)   • NIFEdipine ER (ADALAT CC) 30 MG 24 hr tablet Take 1 tablet (30 mg total) by mouth daily (Patient not taking: Reported on 5/1/2024)       Objective     /78 (BP Location: Left arm, Patient Position: Sitting, Cuff Size: Adult)   Pulse 86   Temp 97.9 °F (36.6 °C) (Temporal)   Resp 16   Ht 5' 2\" (1.575 m)   Wt 61.2 kg (135 lb)   LMP  (LMP Unknown)   SpO2 97%   BMI 24.69 kg/m²     Physical Exam  Vitals reviewed.   Constitutional:       General: She is not in acute distress.     Appearance: Normal appearance. She is normal weight. She is not ill-appearing.   HENT:      Head: Normocephalic and atraumatic.      Mouth/Throat:      Mouth: Mucous membranes are moist.      Pharynx: Oropharynx is clear.   Neck:      Vascular: No carotid bruit.   Cardiovascular:      Rate and Rhythm: Normal rate and regular rhythm.   Pulmonary:      Effort: Pulmonary effort is normal.      Breath sounds: Normal breath sounds.   Abdominal:      General: Abdomen is flat. Bowel sounds are normal.      Palpations: Abdomen is soft.      Tenderness: There is no abdominal tenderness. There is no right CVA tenderness or left CVA tenderness.   Musculoskeletal:         General: No tenderness.      Right lower leg: No edema.      Left lower leg: No edema.      Comments: No calf tenderness bilateral.   Skin:     General: Skin is warm.   Neurological:      General: No focal deficit present.      Mental Status: She is alert and oriented to person, place, and time.   Psychiatric:         Mood and Affect: " Mood normal.         Behavior: Behavior normal.         Thought Content: Thought content normal.      Comments: Patient slightly anxious regarding testing she has not done.  Discussed with patient.  Patient counseled.  No SI HI.       Subhash Leonard MD

## 2024-06-11 DIAGNOSIS — M25.561 CHRONIC PAIN OF BOTH KNEES: ICD-10-CM

## 2024-06-11 DIAGNOSIS — M25.562 CHRONIC PAIN OF BOTH KNEES: ICD-10-CM

## 2024-06-11 DIAGNOSIS — G89.29 CHRONIC PAIN OF BOTH KNEES: ICD-10-CM

## 2024-06-11 RX ORDER — CAPSAICIN 0.75 MG/G
CREAM TOPICAL 3 TIMES DAILY
Qty: 360 G | Refills: 2 | Status: SHIPPED | OUTPATIENT
Start: 2024-06-11

## 2024-07-24 ENCOUNTER — OFFICE VISIT (OUTPATIENT)
Dept: FAMILY MEDICINE CLINIC | Facility: CLINIC | Age: 79
End: 2024-07-24
Payer: COMMERCIAL

## 2024-07-24 VITALS
RESPIRATION RATE: 16 BRPM | HEART RATE: 90 BPM | WEIGHT: 133 LBS | TEMPERATURE: 98.3 F | HEIGHT: 62 IN | SYSTOLIC BLOOD PRESSURE: 186 MMHG | BODY MASS INDEX: 24.48 KG/M2 | DIASTOLIC BLOOD PRESSURE: 84 MMHG | OXYGEN SATURATION: 96 %

## 2024-07-24 DIAGNOSIS — J06.9 UPPER RESPIRATORY TRACT INFECTION, UNSPECIFIED TYPE: Primary | ICD-10-CM

## 2024-07-24 DIAGNOSIS — R05.1 ACUTE COUGH: ICD-10-CM

## 2024-07-24 DIAGNOSIS — J45.30 MILD PERSISTENT ASTHMA WITHOUT COMPLICATION: ICD-10-CM

## 2024-07-24 DIAGNOSIS — J40 BRONCHITIS: ICD-10-CM

## 2024-07-24 LAB
SARS-COV-2 AG UPPER RESP QL IA: NEGATIVE
VALID CONTROL: NORMAL

## 2024-07-24 PROCEDURE — 87811 SARS-COV-2 COVID19 W/OPTIC: CPT | Performed by: FAMILY MEDICINE

## 2024-07-24 PROCEDURE — 99213 OFFICE O/P EST LOW 20 MIN: CPT | Performed by: FAMILY MEDICINE

## 2024-07-24 RX ORDER — PREDNISONE 10 MG/1
TABLET ORAL
Qty: 7 TABLET | Refills: 0 | Status: SHIPPED | OUTPATIENT
Start: 2024-07-24 | End: 2024-07-28

## 2024-07-24 RX ORDER — BENZONATATE 100 MG/1
100 CAPSULE ORAL 3 TIMES DAILY PRN
Qty: 20 CAPSULE | Refills: 0 | Status: SHIPPED | OUTPATIENT
Start: 2024-07-24

## 2024-07-24 RX ORDER — ALBUTEROL SULFATE 90 UG/1
2 AEROSOL, METERED RESPIRATORY (INHALATION) EVERY 6 HOURS PRN
Qty: 6.7 G | Refills: 2 | Status: SHIPPED | OUTPATIENT
Start: 2024-07-24

## 2024-07-24 RX ORDER — AZITHROMYCIN 250 MG/1
TABLET, FILM COATED ORAL
Qty: 6 TABLET | Refills: 0 | Status: SHIPPED | OUTPATIENT
Start: 2024-07-24 | End: 2024-07-29

## 2024-07-24 NOTE — LETTER
July 24, 2024     Patient: Latrice Palacio  YOB: 1945  Date of Visit: 7/24/2024      To Whom it May Concern:    Latrice Palacio is under my professional care. Latrice was seen in my office on 7/24/2024. Latrice Please excuse her from work from 7/23/24 till 7/25/24. Patient may return to work on 7 26 24 if she's feeling better.    If you have any questions or concerns, please don't hesitate to call.         Sincerely,          Subhash Leonard MD        CC: No Recipients

## 2024-07-24 NOTE — PROGRESS NOTES
Ambulatory Visit  Name: Latrice Palacio      : 1945      MRN: [id number]  Encounter Provider: Subhash Leonard MD  Encounter Date: 2024   Encounter department: [facility]    Assessment & Plan   1. Upper respiratory tract infection, unspecified type  -     azithromycin (Zithromax) 250 mg tablet; Take 2 tablets (500 mg total) by mouth daily for 1 day, THEN 1 tablet (250 mg total) daily for 4 days.  2. Bronchitis  -     azithromycin (Zithromax) 250 mg tablet; Take 2 tablets (500 mg total) by mouth daily for 1 day, THEN 1 tablet (250 mg total) daily for 4 days.  -     predniSONE 10 mg tablet; Take 2 tablets (20 mg total) by mouth daily for 2 days, THEN 1 tablet (10 mg total) daily for 3 days.  -     benzonatate (TESSALON PERLES) 100 mg capsule; Take 1 capsule (100 mg total) by mouth 3 (three) times a day as needed for cough  3. Acute cough  -     predniSONE 10 mg tablet; Take 2 tablets (20 mg total) by mouth daily for 2 days, THEN 1 tablet (10 mg total) daily for 3 days.  -     benzonatate (TESSALON PERLES) 100 mg capsule; Take 1 capsule (100 mg total) by mouth 3 (three) times a day as needed for cough  -     POCT Rapid Covid Ag  4. Mild persistent asthma without complication  -     albuterol (Ventolin HFA) 90 mcg/act inhaler; Inhale 2 puffs every 6 (six) hours as needed for wheezing or shortness of breath  -     predniSONE 10 mg tablet; Take 2 tablets (20 mg total) by mouth daily for 2 days, THEN 1 tablet (10 mg total) daily for 3 days.        Regarding her URI/bronchitis/cough and history of mild persistent asthma, discussed with patient.  Patient in no acute distress.  Patient's rapid COVID test was negative today with us today.  Discussed with patient.  Patient education.  Patient hydration.  Take vitamin C and zinc.  Patient to gargle with salt water.  Patient is prescribed the azithromycin that she will start taking tonight.  Patient also take a probiotic.  Patient is  advised to hold her statin.  Patient will start taking prednisone today with lunch.  Patient is prescribed Tessalon Perles as needed also.  Patient will get recall 1100 with echinacea as cough drops.  Patient will use her albuterol inhaler every 6 hours as needed and in addition to that we will use it 3 times a day for the next 3 to 4 days.  Patient also continue her maintenance inhaler.  Patient is given a note for yesterday today and tomorrow she can return on Friday if she is feeling better.  Patient is also advised that if her symptoms worsen changes and or she does not feel comfortable with anyone of them then patient to go to the emergency room.             History of Present Illness     79-year-old female here to be evaluated for her URI and cough that started last Thursday.  Patient asserts she came back from [place] on Wednesday, with her family she felt sick the following day on Thursday.  Per patient no other family members have been sick.  Patient denies tobacco.  Patient is asthmatic uses the albuterol inhaler as needed as well as Flovent twice daily.  Patient denies any shortness of breath, chest pain palpitations fever or chills or headache or dizziness.  Patient did miss work yesterday and would like a note for yesterday as well as today.  Patient is a little anxious her blood pressure and heart rate are little bit elevated patient denies any acute cardiovascular neuro or pulmonary symptoms from her elevated blood pressure heart rate patient does admit to taking over-the-counter medicine to help with her symptoms.        Review of Systems   Constitutional:  Negative for chills, fatigue, fever and unexpected weight change.   HENT:  Positive for congestion and sore throat.    Eyes:  Negative for visual disturbance.   Respiratory:  Positive for cough. Negative for shortness of breath.    Cardiovascular:  Negative for chest pain and palpitations.   Gastrointestinal:  Negative for abdominal pain.  "  Genitourinary:  Negative for dysuria.   Neurological:  Negative for dizziness and headaches.       Objective     BP (!) 186/84 (BP Location: Left arm, Patient Position: Sitting, Cuff Size: Adult)   Pulse 90   Temp 98.3 °F (36.8 °C) (Temporal)   Resp 16   Ht 5' 2\" (1.575 m)   Wt 60.3 kg (133 lb)   LMP  (LMP Unknown)   SpO2 96%   BMI 24.33 kg/m²     Physical Exam  Vitals reviewed.   Constitutional:       General: She is not in acute distress.     Appearance: Normal appearance. She is not ill-appearing.   HENT:      Head: Normocephalic and atraumatic.      Right Ear: Tympanic membrane, ear canal and external ear normal.      Left Ear: Tympanic membrane, ear canal and external ear normal.      Mouth/Throat:      Mouth: Mucous membranes are moist.      Pharynx: No oropharyngeal exudate or posterior oropharyngeal erythema.   Eyes:      Extraocular Movements: Extraocular movements intact.      Conjunctiva/sclera: Conjunctivae normal.   Neck:      Vascular: No carotid bruit.   Cardiovascular:      Rate and Rhythm: Normal rate and regular rhythm.   Pulmonary:      Effort: Pulmonary effort is normal.      Breath sounds: Normal breath sounds. No wheezing or rhonchi.   Musculoskeletal:      Right lower leg: No edema.      Left lower leg: No edema.      Comments: No calf tenderness bilateral.   Lymphadenopathy:      Cervical: No cervical adenopathy.   Skin:     General: Skin is warm.   Neurological:      General: No focal deficit present.      Mental Status: She is alert and oriented to person, place, and time.   Psychiatric:         Mood and Affect: Mood normal.         Behavior: Behavior normal.         Thought Content: Thought content normal.       Administrative Statements           "

## 2024-10-09 DIAGNOSIS — M85.80 OSTEOPENIA, UNSPECIFIED LOCATION: ICD-10-CM

## 2024-10-10 RX ORDER — ALENDRONATE SODIUM 70 MG/1
70 TABLET ORAL
Qty: 12 TABLET | Refills: 1 | Status: SHIPPED | OUTPATIENT
Start: 2024-10-10

## 2024-10-26 ENCOUNTER — APPOINTMENT (OUTPATIENT)
Dept: LAB | Facility: CLINIC | Age: 79
End: 2024-10-26
Payer: COMMERCIAL

## 2024-10-26 DIAGNOSIS — I10 PRIMARY HYPERTENSION: ICD-10-CM

## 2024-10-26 DIAGNOSIS — E78.2 MIXED HYPERLIPIDEMIA: ICD-10-CM

## 2024-10-26 DIAGNOSIS — R73.03 PREDIABETES: ICD-10-CM

## 2024-10-26 DIAGNOSIS — N30.00 ACUTE CYSTITIS WITHOUT HEMATURIA: ICD-10-CM

## 2024-10-26 LAB
ALBUMIN SERPL BCG-MCNC: 4.4 G/DL (ref 3.5–5)
ALP SERPL-CCNC: 72 U/L (ref 34–104)
ALT SERPL W P-5'-P-CCNC: 14 U/L (ref 7–52)
ANION GAP SERPL CALCULATED.3IONS-SCNC: 11 MMOL/L (ref 4–13)
AST SERPL W P-5'-P-CCNC: 22 U/L (ref 13–39)
BACTERIA UR QL AUTO: ABNORMAL /HPF
BILIRUB SERPL-MCNC: 0.55 MG/DL (ref 0.2–1)
BILIRUB UR QL STRIP: NEGATIVE
BUN SERPL-MCNC: 16 MG/DL (ref 5–25)
CALCIUM SERPL-MCNC: 9.5 MG/DL (ref 8.4–10.2)
CHLORIDE SERPL-SCNC: 104 MMOL/L (ref 96–108)
CHOLEST SERPL-MCNC: 218 MG/DL
CLARITY UR: CLEAR
CO2 SERPL-SCNC: 23 MMOL/L (ref 21–32)
COLOR UR: COLORLESS
CREAT SERPL-MCNC: 0.8 MG/DL (ref 0.6–1.3)
EST. AVERAGE GLUCOSE BLD GHB EST-MCNC: 131 MG/DL
GFR SERPL CREATININE-BSD FRML MDRD: 70 ML/MIN/1.73SQ M
GLUCOSE P FAST SERPL-MCNC: 108 MG/DL (ref 65–99)
GLUCOSE UR STRIP-MCNC: NEGATIVE MG/DL
HBA1C MFR BLD: 6.2 %
HDLC SERPL-MCNC: 39 MG/DL
HGB UR QL STRIP.AUTO: NEGATIVE
KETONES UR STRIP-MCNC: NEGATIVE MG/DL
LDLC SERPL CALC-MCNC: 138 MG/DL (ref 0–100)
LEUKOCYTE ESTERASE UR QL STRIP: ABNORMAL
NITRITE UR QL STRIP: NEGATIVE
NON-SQ EPI CELLS URNS QL MICRO: ABNORMAL /HPF
NONHDLC SERPL-MCNC: 179 MG/DL
PH UR STRIP.AUTO: 6.5 [PH]
POTASSIUM SERPL-SCNC: 4.1 MMOL/L (ref 3.5–5.3)
PROT SERPL-MCNC: 7.4 G/DL (ref 6.4–8.4)
PROT UR STRIP-MCNC: NEGATIVE MG/DL
RBC #/AREA URNS AUTO: ABNORMAL /HPF
SODIUM SERPL-SCNC: 138 MMOL/L (ref 135–147)
SP GR UR STRIP.AUTO: 1.01 (ref 1–1.03)
TRIGL SERPL-MCNC: 207 MG/DL
UROBILINOGEN UR STRIP-ACNC: <2 MG/DL
WBC #/AREA URNS AUTO: ABNORMAL /HPF

## 2024-10-26 PROCEDURE — 36415 COLL VENOUS BLD VENIPUNCTURE: CPT

## 2024-10-26 PROCEDURE — 83036 HEMOGLOBIN GLYCOSYLATED A1C: CPT

## 2024-10-26 PROCEDURE — 81001 URINALYSIS AUTO W/SCOPE: CPT

## 2024-10-26 PROCEDURE — 80053 COMPREHEN METABOLIC PANEL: CPT

## 2024-10-26 PROCEDURE — 80061 LIPID PANEL: CPT

## 2024-10-28 ENCOUNTER — TELEPHONE (OUTPATIENT)
Dept: FAMILY MEDICINE CLINIC | Facility: CLINIC | Age: 79
End: 2024-10-28

## 2024-10-28 NOTE — TELEPHONE ENCOUNTER
----- Message from Subhash Leonard MD sent at 10/28/2024  9:40 AM EDT -----  Please call the patient regarding her abnormal result.  Labs slightly better. Will see her 10 31 24. ty

## 2024-10-31 ENCOUNTER — OFFICE VISIT (OUTPATIENT)
Dept: FAMILY MEDICINE CLINIC | Facility: CLINIC | Age: 79
End: 2024-10-31
Payer: COMMERCIAL

## 2024-10-31 VITALS
BODY MASS INDEX: 24.84 KG/M2 | SYSTOLIC BLOOD PRESSURE: 160 MMHG | WEIGHT: 135 LBS | HEART RATE: 89 BPM | RESPIRATION RATE: 15 BRPM | DIASTOLIC BLOOD PRESSURE: 84 MMHG | OXYGEN SATURATION: 97 % | HEIGHT: 62 IN

## 2024-10-31 DIAGNOSIS — M25.562 CHRONIC PAIN OF BOTH KNEES: ICD-10-CM

## 2024-10-31 DIAGNOSIS — M25.561 CHRONIC PAIN OF BOTH KNEES: ICD-10-CM

## 2024-10-31 DIAGNOSIS — R73.03 PREDIABETES: ICD-10-CM

## 2024-10-31 DIAGNOSIS — I10 PRIMARY HYPERTENSION: ICD-10-CM

## 2024-10-31 DIAGNOSIS — Z12.11 SCREEN FOR COLON CANCER: ICD-10-CM

## 2024-10-31 DIAGNOSIS — M81.0 AGE-RELATED OSTEOPOROSIS WITHOUT CURRENT PATHOLOGICAL FRACTURE: ICD-10-CM

## 2024-10-31 DIAGNOSIS — E55.9 VITAMIN D INSUFFICIENCY: ICD-10-CM

## 2024-10-31 DIAGNOSIS — Z00.00 ANNUAL PHYSICAL EXAM: Primary | ICD-10-CM

## 2024-10-31 DIAGNOSIS — Z12.31 ENCOUNTER FOR SCREENING MAMMOGRAM FOR MALIGNANT NEOPLASM OF BREAST: ICD-10-CM

## 2024-10-31 DIAGNOSIS — N18.2 CKD (CHRONIC KIDNEY DISEASE) STAGE 2, GFR 60-89 ML/MIN: ICD-10-CM

## 2024-10-31 DIAGNOSIS — G89.29 CHRONIC PAIN OF BOTH KNEES: ICD-10-CM

## 2024-10-31 DIAGNOSIS — E78.2 MIXED HYPERLIPIDEMIA: ICD-10-CM

## 2024-10-31 DIAGNOSIS — Z23 NEED FOR VACCINATION: ICD-10-CM

## 2024-10-31 PROCEDURE — 99214 OFFICE O/P EST MOD 30 MIN: CPT | Performed by: FAMILY MEDICINE

## 2024-10-31 PROCEDURE — 99397 PER PM REEVAL EST PAT 65+ YR: CPT | Performed by: FAMILY MEDICINE

## 2024-10-31 RX ORDER — NIFEDIPINE 30 MG
30 TABLET, EXTENDED RELEASE ORAL DAILY
Qty: 90 TABLET | Refills: 3 | Status: SHIPPED | OUTPATIENT
Start: 2024-10-31 | End: 2025-10-26

## 2024-10-31 RX ORDER — CAPSAICIN 0.75 MG/G
CREAM TOPICAL 3 TIMES DAILY
Qty: 360 G | Refills: 2 | Status: SHIPPED | OUTPATIENT
Start: 2024-10-31

## 2024-10-31 RX ORDER — ALENDRONATE SODIUM 70 MG/1
70 TABLET ORAL
Qty: 12 TABLET | Refills: 3 | Status: SHIPPED | OUTPATIENT
Start: 2024-10-31

## 2024-10-31 RX ORDER — ZOSTER VACCINE RECOMBINANT, ADJUVANTED 50 MCG/0.5
0.5 KIT INTRAMUSCULAR ONCE
Qty: 1 EACH | Refills: 1 | Status: SHIPPED | OUTPATIENT
Start: 2024-10-31 | End: 2024-10-31

## 2024-10-31 NOTE — ASSESSMENT & PLAN NOTE
Discussed with patient.  Patient cream is refilled as per her request.  Patient is ordered x-rays of bilateral knees by me before and patient is readvised today to get them done.  Orders:    capsicum (ZOSTRIX) 0.075 % topical cream; Apply topically 3 (three) times a day To the affected area

## 2024-10-31 NOTE — ASSESSMENT & PLAN NOTE
Improved but still high.  LFTs normal.  Total cholesterol went down to 218 from 242 from March of this year, triglycerides went down to 207 from 233, HDL went down to 39 and 45 and her LDL went down to 138 from 150.  Patient did and does admit to eating pork.  Patient has improved a little bit but I advised patient that she needs to do more.  Patient will continue her statin.  And I will recheck the labs again in 3 months.  Orders:    Comprehensive metabolic panel; Future    Lipid panel; Future

## 2024-10-31 NOTE — ASSESSMENT & PLAN NOTE
Lab Results   Component Value Date    EGFR 70 10/26/2024    EGFR 70 03/02/2024    EGFR 81 02/26/2023    CREATININE 0.80 10/26/2024    CREATININE 0.81 03/02/2024    CREATININE 0.72 02/26/2023     Relatively stable.  Discussed with patient.  Patient advised to have less salt condiments etc.  Patient advised to hydrate well.  Also advised for BP control as well as her prediabetes.  Will continue to monitor and recheck labs in 3 months also.        Speaking Coherently

## 2024-10-31 NOTE — ASSESSMENT & PLAN NOTE
BP not controlled.  Patient was not taking her medication.  Discussed with patient.  Patient education.  Patient advised to have less salt and condiments etc.  Hydrate well.  Refilled her medication.  And follow-up on labs.  Orders:    NIFEdipine ER (ADALAT CC) 30 MG 24 hr tablet; Take 1 tablet (30 mg total) by mouth daily    Comprehensive metabolic panel; Future

## 2024-10-31 NOTE — ASSESSMENT & PLAN NOTE
Discussed with patient.  Patient ordered a bone density.  And her Fosamax was refilled also.  Orders:    alendronate (FOSAMAX) 70 mg tablet; Take 1 tablet (70 mg total) by mouth every 7 days    DXA bone density spine hip and pelvis; Future

## 2024-10-31 NOTE — PROGRESS NOTES
Adult Annual Physical  Name: Latrice Palacio      : 1945      MRN: [id number]  Encounter Provider: Subhash Leonard MD  Encounter Date: 10/31/2024   Encounter department: [facility]    Assessment & Plan  Annual physical exam  Regarding her annual physical patient is given age and diagnosis appropriate evaluation and care.  Patient's recent blood work and urine was discussed with patient.  Patient is ordered more blood work for her next visit.  Patient will get the flu vaccine and possibly the updated COVID-19 vaccine when she is feeling better in a week or 2.  Patient is sent the RSV vaccine, Tdap as well as the Shingrix vaccine to her pharmacy.  Patient advised to continue taking her multivitamin, vitamin D3, calcium and vitamin D.  Patient advised to improve/healthier diet and exercise as tolerated.  And patient sent to GI for her colonoscopy.  Orders:    Comprehensive metabolic panel; Future    Lipid panel; Future    UA w Reflex to Microscopic w Reflex to Culture; Future    Primary hypertension  BP not controlled.  Patient was not taking her medication.  Discussed with patient.  Patient education.  Patient advised to have less salt and condiments etc.  Hydrate well.  Refilled her medication.  And follow-up on labs.  Orders:    NIFEdipine ER (ADALAT CC) 30 MG 24 hr tablet; Take 1 tablet (30 mg total) by mouth daily    Comprehensive metabolic panel; Future    CKD (chronic kidney disease) stage 2, GFR 60-89 ml/min  Lab Results   Component Value Date    EGFR 70 10/26/2024    EGFR 70 2024    EGFR 81 2023    CREATININE 0.80 10/26/2024    CREATININE 0.81 2024    CREATININE 0.72 2023     Relatively stable.  Discussed with patient.  Patient advised to have less salt condiments etc.  Patient advised to hydrate well.  Also advised for BP control as well as her prediabetes.  Will continue to monitor and recheck labs in 3 months also.       Mixed  hyperlipidemia  Improved but still high.  LFTs normal.  Total cholesterol went down to 218 from 242 from March of this year, triglycerides went down to 207 from 233, HDL went down to 39 and 45 and her LDL went down to 138 from 150.  Patient did and does admit to eating pork.  Patient has improved a little bit but I advised patient that she needs to do more.  Patient will continue her statin.  And I will recheck the labs again in 3 months.  Orders:    Comprehensive metabolic panel; Future    Lipid panel; Future    Prediabetes  Stable.  Glucose was 108 and her A1c remained the same at 6.2 and now as well as in March of last year.  In February 2023.  Patient advised to have less starches sweets and fats.  Diet and exercise.  Recheck labs in 3 months also.  Orders:    Comprehensive metabolic panel; Future    Hemoglobin A1C; Future    Albumin / creatinine urine ratio; Future    Vitamin D insufficiency  Patient advised to supplement with vitamin D3 2 to 3000 units daily.       Chronic pain of both knees  Discussed with patient.  Patient cream is refilled as per her request.  Patient is ordered x-rays of bilateral knees by me before and patient is readvised today to get them done.  Orders:    capsicum (ZOSTRIX) 0.075 % topical cream; Apply topically 3 (three) times a day To the affected area    Age-related osteoporosis without current pathological fracture  Discussed with patient.  Patient ordered a bone density.  And her Fosamax was refilled also.  Orders:    alendronate (FOSAMAX) 70 mg tablet; Take 1 tablet (70 mg total) by mouth every 7 days    DXA bone density spine hip and pelvis; Future    Screen for colon cancer  Discussed with patient.  Patient sent to GI for possible patient is amenable to it.  Orders:    Ambulatory Referral to Gastroenterology; Future    Need for vaccination  Discussed with patient.  Orders:    tetanus-diphtheria-acellular pertussis (ADACEL) 5-2-15.5 LF-mcg/0.5 injection; Inject 0.5 mL into a  muscle once for 1 dose    Zoster Vac Recomb Adjuvanted (Shingrix) 50 MCG/0.5ML SUSR; Inject 0.5 mL into a muscle once for 1 dose Repeat dose in 2 to 6 months    RSV Pre-Fusion F A&B Vac Rcmb (Abrysvo) SOLR vaccine; Inject 0.5 mL into a muscle once for 1 dose    Encounter for screening mammogram for malignant neoplasm of breast    Orders:    Mammo screening bilateral w 3d and cad; Future    Immunizations and preventive care screenings were discussed with patient today. Appropriate education was printed on patient's after visit summary.    Counseling:  Alcohol/drug use: discussed moderation in alcohol intake, the recommendations for healthy alcohol use, and avoidance of illicit drug use.  Dental Health: discussed importance of regular tooth brushing, flossing, and dental visits.  Injury prevention: discussed safety/seat belts, safety helmets, smoke detectors, carbon monoxide detectors, and smoking near bedding or upholstery.  Exercise: the importance of regular exercise/physical activity was discussed. Recommend exercise 3-5 times per week for at least 30 minutes.       Depression Screening and Follow-up Plan: Patient was screened for depression during today's encounter. They screened negative with a PHQ-2 score of 0.        History of Present Illness     Adult Annual Physical:  Patient presents for annual physical. 79-year-old female here for annual physical.  Patient also like to be evaluated for her hypertension, hyperlipidemia and prediabetes.  Patient did blood work and urine recently.  Patient has not been taking her nifedipine for hypertension.  Patient's blood pressure upon arrival was 160/84 and when I rechecked it was 160/80.  Patient denies any acute cardiovascular neurosymptoms from her elevated systolic BP.  Patient has not done her bone density secondary to her osteoporosis.  Patient also has not done her mammogram.  Patient currently feels a little under the weather so she will get the annual flu  "vaccine.  Patient is also due for the COVID-19 updated vaccine, RSV as well as the Tdap and the shingles vaccine.  No history of an adverse reaction to vaccines in the past.  Patient denies tobacco.  Patient does admit to eating poorly/unhealthy..     Diet and Physical Activity:  - Diet/Nutrition: poor diet.  - Exercise: 1-2 times a week on average.    Depression Screening:  - PHQ-2 Score: 0    General Health:  - Sleep: sleeps well.  - Hearing: normal hearing bilateral ears.  - Vision: no vision problems.  - Dental: regular dental visits.    /GYN Health:  - Follows with GYN: yes.   - Menopause: postmenopausal.   - History of STDs: no    Advanced Care Planning:  - Has an advanced directive?: yes      Review of Systems   Constitutional:  Negative for activity change, appetite change, chills, fatigue, fever and unexpected weight change.   HENT:  Positive for congestion. Negative for hearing loss and sore throat.    Eyes:  Negative for visual disturbance.   Respiratory:  Negative for cough and shortness of breath.    Cardiovascular:  Negative for chest pain and palpitations.   Gastrointestinal:  Negative for abdominal pain, blood in stool, constipation, diarrhea, nausea and vomiting.   Genitourinary:  Negative for dysuria and hematuria.   Musculoskeletal:  Negative for arthralgias.   Skin:  Negative for rash.   Neurological:  Negative for dizziness and headaches.   Psychiatric/Behavioral:  Negative for dysphoric mood and sleep disturbance. The patient is not nervous/anxious.          Objective     /84   Pulse 89   Resp 15   Ht 5' 2\" (1.575 m)   Wt 61.2 kg (135 lb)   LMP  (LMP Unknown)   SpO2 97%   BMI 24.69 kg/m²     Physical Exam  Vitals reviewed.   Constitutional:       General: She is not in acute distress.     Appearance: Normal appearance. She is normal weight. She is not ill-appearing.   HENT:      Head: Normocephalic and atraumatic.      Right Ear: Tympanic membrane, ear canal and external ear " normal.      Left Ear: Tympanic membrane, ear canal and external ear normal.      Mouth/Throat:      Mouth: Mucous membranes are moist.      Pharynx: Oropharynx is clear.   Eyes:      Extraocular Movements: Extraocular movements intact.      Conjunctiva/sclera: Conjunctivae normal.   Neck:      Vascular: No carotid bruit.   Cardiovascular:      Rate and Rhythm: Normal rate and regular rhythm.   Pulmonary:      Effort: Pulmonary effort is normal.      Breath sounds: Normal breath sounds. No wheezing or rhonchi.   Abdominal:      General: Bowel sounds are normal.      Palpations: Abdomen is soft.      Tenderness: There is no abdominal tenderness. There is no right CVA tenderness or left CVA tenderness.   Musculoskeletal:         General: No tenderness.      Right lower leg: No edema.      Left lower leg: No edema.      Comments: No calf tenderness bilateral.   Lymphadenopathy:      Cervical: No cervical adenopathy.   Skin:     General: Skin is warm.   Neurological:      General: No focal deficit present.      Mental Status: She is alert and oriented to person, place, and time.   Psychiatric:         Mood and Affect: Mood normal.         Behavior: Behavior normal.         Thought Content: Thought content normal.

## 2024-10-31 NOTE — PATIENT INSTRUCTIONS
"Patient Education     Routine physical for adults   The Basics   Written by the doctors and editors at [facility]   What is a physical? -- A physical is a routine visit, or \"check-up,\" with your doctor. You might also hear it called a \"wellness visit\" or \"preventive visit.\"  During each visit, the doctor will:   Ask about your physical and mental health   Ask about your habits, behaviors, and lifestyle   Do an exam   Give you vaccines if needed   Talk to you about any medicines you take   Give advice about your health   Answer your questions  Getting regular check-ups is an important part of taking care of your health. It can help your doctor find and treat any problems you have. But it's also important for preventing health problems.  A routine physical is different from a \"sick visit.\" A sick visit is when you see a doctor because of a health concern or problem. Since physicals are scheduled ahead of time, you can think about what you want to ask the doctor.  How often should I get a physical? -- It depends on your age and health. In general, for people age 21 years and older:   If you are younger than 50 years, you might be able to get a physical every 3 years.   If you are 50 years or older, your doctor might recommend a physical every year.  If you have an ongoing health condition, like diabetes or high blood pressure, your doctor will probably want to see you more often.  What happens during a physical? -- In general, each visit will include:   Physical exam - The doctor or nurse will check your height, weight, heart rate, and blood pressure. They will also look at your eyes and ears. They will ask about how you are feeling and whether you have any symptoms that bother you.   Medicines - It's a good idea to bring a list of all the medicines you take to each doctor visit. Your doctor will talk to you about your medicines and answer any questions. Tell them if you are having any side effects that bother you. You " "should also tell them if you are having trouble paying for any of your medicines.   Habits and behaviors - This includes:   Your diet   Your exercise habits   Whether you smoke, drink alcohol, or use drugs   Whether you are sexually active   Whether you feel safe at home  Your doctor will talk to you about things you can do to improve your health and lower your risk of health problems. They will also offer help and support. For example, if you want to quit smoking, they can give you advice and might prescribe medicines. If you want to improve your diet or get more physical activity, they can help you with this, too.   Lab tests, if needed - The tests you get will depend on your age and situation. For example, your doctor might want to check your:   Cholesterol   Blood sugar   Iron level   Vaccines - The recommended vaccines will depend on your age, health, and what vaccines you already had. Vaccines are very important because they can prevent certain serious or deadly infections.   Discussion of screening - \"Screening\" means checking for diseases or other health problems before they cause symptoms. Your doctor can recommend screening based on your age, risk, and preferences. This might include tests to check for:   Cancer, such as breast, prostate, cervical, ovarian, colorectal, prostate, lung, or skin cancer   Sexually transmitted infections, such as chlamydia and gonorrhea   Mental health conditions like depression and anxiety  Your doctor will talk to you about the different types of screening tests. They can help you decide which screenings to have. They can also explain what the results might mean.   Answering questions - The physical is a good time to ask the doctor or nurse questions about your health. If needed, they can refer you to other doctors or specialists, too.  Adults older than 65 years often need other care, too. As you get older, your doctor will talk to you about:   How to prevent falling at " home   Hearing or vision tests   Memory testing   How to take your medicines safely   Making sure that you have the help and support you need at home  All topics are updated as new evidence becomes available and our peer review process is complete.  This topic retrieved from [employer] on: May 02, 2024.  Topic 429497 Version 1.0  Release: 32.4.3 - C32.122  © 2024 UpToDate, Inc. and/or its affiliates. All rights reserved.  Consumer Information Use and Disclaimer   Disclaimer: This generalized information is a limited summary of diagnosis, treatment, and/or medication information. It is not meant to be comprehensive and should be used as a tool to help the user understand and/or assess potential diagnostic and treatment options. It does NOT include all information about conditions, treatments, medications, side effects, or risks that may apply to a specific patient. It is not intended to be medical advice or a substitute for the medical advice, diagnosis, or treatment of a health care provider based on the health care provider's examination and assessment of a patient's specific and unique circumstances. Patients must speak with a health care provider for complete information about their health, medical questions, and treatment options, including any risks or benefits regarding use of medications. This information does not endorse any treatments or medications as safe, effective, or approved for treating a specific patient. UpToDate, Inc. and its affiliates disclaim any warranty or liability relating to this information or the use thereof.The use of this information is governed by the Terms of Use, available at https://www.[employer].com/en/know/clinical-effectiveness-terms. 2024© UpToDate, Inc. and its affiliates and/or licensors. All rights reserved.  Copyright   © 2024 UpToDate, Inc. and/or its affiliates. All rights reserved.

## 2024-10-31 NOTE — ASSESSMENT & PLAN NOTE
Regarding her annual physical patient is given age and diagnosis appropriate evaluation and care.  Patient's recent blood work and urine was discussed with patient.  Patient is ordered more blood work for her next visit.  Patient will get the flu vaccine and possibly the updated COVID-19 vaccine when she is feeling better in a week or 2.  Patient is sent the RSV vaccine, Tdap as well as the Shingrix vaccine to her pharmacy.  Patient advised to continue taking her multivitamin, vitamin D3, calcium and vitamin D.  Patient advised to improve/healthier diet and exercise as tolerated.  And patient sent to GI for her colonoscopy.  Orders:    Comprehensive metabolic panel; Future    Lipid panel; Future    UA w Reflex to Microscopic w Reflex to Culture; Future

## 2024-10-31 NOTE — ASSESSMENT & PLAN NOTE
Stable.  Glucose was 108 and her A1c remained the same at 6.2 and now as well as in March of last year.  In February 2023.  Patient advised to have less starches sweets and fats.  Diet and exercise.  Recheck labs in 3 months also.  Orders:    Comprehensive metabolic panel; Future    Hemoglobin A1C; Future    Albumin / creatinine urine ratio; Future

## 2025-03-12 ENCOUNTER — RESULTS FOLLOW-UP (OUTPATIENT)
Dept: FAMILY MEDICINE CLINIC | Facility: CLINIC | Age: 80
End: 2025-03-12

## 2025-03-12 LAB
ALBUMIN SERPL-MCNC: 4.6 G/DL (ref 3.5–5.7)
ALBUMIN/CREAT UR: ABNORMAL
ALP SERPL-CCNC: 64 U/L (ref 35–120)
ALT SERPL-CCNC: 18 U/L
ANION GAP SERPL CALCULATED.3IONS-SCNC: 9 MMOL/L (ref 3–11)
AST SERPL-CCNC: 19 U/L
BACTERIA URNS QL MICRO: ABNORMAL
BILIRUB SERPL-MCNC: 0.4 MG/DL (ref 0.2–1)
BUN SERPL-MCNC: 19 MG/DL (ref 7–25)
CALCIUM SERPL-MCNC: 9.8 MG/DL (ref 8.5–10.5)
CHLORIDE SERPL-SCNC: 101 MMOL/L (ref 100–109)
CHOLEST SERPL-MCNC: 229 MG/DL
CHOLEST/HDLC SERPL: 4.5 {RATIO}
CO2 SERPL-SCNC: 28 MMOL/L (ref 21–31)
CREAT SERPL-MCNC: 0.89 MG/DL (ref 0.4–1.1)
CREAT UR-MCNC: 45.3 MG/DL (ref 50–200)
CYTOLOGY CMNT CVX/VAG CYTO-IMP: ABNORMAL
EST. AVERAGE GLUCOSE BLD GHB EST-MCNC: 134 MG/DL
GFR/BSA.PRED SERPLBLD CYS-BASED-ARV: 66 ML/MIN/{1.73_M2}
GLUCOSE SERPL-MCNC: 138 MG/DL (ref 65–99)
GLUCOSE UR QL STRIP: NEGATIVE MG/DL
HBA1C MFR BLD: 6.3 %
HDLC SERPL-MCNC: 51 MG/DL (ref 23–92)
HGB UR QL STRIP: NEGATIVE MG/DL
KETONES UR QL STRIP: NEGATIVE MG/DL
LDLC SERPL CALC-MCNC: 141 MG/DL
LEUKOCYTE ESTERASE UR QL STRIP: ABNORMAL /UL
MICROALBUMIN UR-MCNC: <0.7 MG/DL
MUCOUS THREADS URNS QL MICRO: ABNORMAL
NITRITE UR QL STRIP: NEGATIVE
NONHDLC SERPL-MCNC: 178 MG/DL
PH UR: 6 [PH] (ref 4.5–8)
POTASSIUM SERPL-SCNC: 4.7 MMOL/L (ref 3.5–5.2)
PROT 24H UR-MRATE: NEGATIVE MG/DL
PROT SERPL-MCNC: 7.4 G/DL (ref 6.3–8.3)
RBC #/AREA URNS HPF: 0 /HPF (ref 0–2)
SL AMB POCT URINE COMMENT: ABNORMAL
SODIUM SERPL-SCNC: 138 MMOL/L (ref 135–145)
SP GR UR: 1.01 (ref 1–1.03)
SQUAMOUS #/AREA URNS HPF: >10 /LPF (ref 0–5)
TRANS CELLS #/AREA URNS HPF: ABNORMAL /LPF (ref 0–1)
TRIGL SERPL-MCNC: 183 MG/DL
WBC #/AREA URNS HPF: ABNORMAL /HPF (ref 0–5)

## 2025-03-12 NOTE — RESULT ENCOUNTER NOTE
Please call the patient regarding her abnormal result.  Asked the patient if she has any UTI symptoms?  A1c and lipids elevated.  Will see patient on 3/21/2025 also. ty

## 2025-03-21 ENCOUNTER — OFFICE VISIT (OUTPATIENT)
Dept: FAMILY MEDICINE CLINIC | Facility: CLINIC | Age: 80
End: 2025-03-21
Payer: COMMERCIAL

## 2025-03-21 VITALS
RESPIRATION RATE: 16 BRPM | OXYGEN SATURATION: 97 % | BODY MASS INDEX: 25.21 KG/M2 | HEIGHT: 62 IN | WEIGHT: 137 LBS | HEART RATE: 86 BPM | DIASTOLIC BLOOD PRESSURE: 80 MMHG | TEMPERATURE: 97.7 F | SYSTOLIC BLOOD PRESSURE: 160 MMHG

## 2025-03-21 DIAGNOSIS — M54.50 CHRONIC MIDLINE LOW BACK PAIN WITHOUT SCIATICA: ICD-10-CM

## 2025-03-21 DIAGNOSIS — N18.2 CKD (CHRONIC KIDNEY DISEASE) STAGE 2, GFR 60-89 ML/MIN: ICD-10-CM

## 2025-03-21 DIAGNOSIS — I10 PRIMARY HYPERTENSION: Primary | ICD-10-CM

## 2025-03-21 DIAGNOSIS — R73.03 PREDIABETES: ICD-10-CM

## 2025-03-21 DIAGNOSIS — R06.09 DYSPNEA ON EXERTION: ICD-10-CM

## 2025-03-21 DIAGNOSIS — J45.30 MILD PERSISTENT ASTHMA WITHOUT COMPLICATION: ICD-10-CM

## 2025-03-21 DIAGNOSIS — M19.90 ARTHRITIS: ICD-10-CM

## 2025-03-21 DIAGNOSIS — E78.2 MIXED HYPERLIPIDEMIA: ICD-10-CM

## 2025-03-21 DIAGNOSIS — Z12.31 ENCOUNTER FOR SCREENING MAMMOGRAM FOR BREAST CANCER: ICD-10-CM

## 2025-03-21 DIAGNOSIS — G89.29 CHRONIC MIDLINE LOW BACK PAIN WITHOUT SCIATICA: ICD-10-CM

## 2025-03-21 DIAGNOSIS — Z23 ENCOUNTER FOR IMMUNIZATION: ICD-10-CM

## 2025-03-21 DIAGNOSIS — R35.1 NOCTURIA: ICD-10-CM

## 2025-03-21 PROBLEM — J45.21 MILD INTERMITTENT ASTHMA WITH ACUTE EXACERBATION: Status: RESOLVED | Noted: 2019-05-20 | Resolved: 2025-03-21

## 2025-03-21 PROCEDURE — 90471 IMMUNIZATION ADMIN: CPT | Performed by: FAMILY MEDICINE

## 2025-03-21 PROCEDURE — 99214 OFFICE O/P EST MOD 30 MIN: CPT | Performed by: FAMILY MEDICINE

## 2025-03-21 PROCEDURE — 90662 IIV NO PRSV INCREASED AG IM: CPT | Performed by: FAMILY MEDICINE

## 2025-03-21 RX ORDER — ATORVASTATIN CALCIUM 40 MG/1
40 TABLET, FILM COATED ORAL DAILY
Qty: 100 TABLET | Refills: 3 | Status: SHIPPED | OUTPATIENT
Start: 2025-03-21

## 2025-03-21 RX ORDER — NIFEDIPINE 30 MG
30 TABLET, EXTENDED RELEASE ORAL DAILY
Qty: 100 TABLET | Refills: 3 | Status: SHIPPED | OUTPATIENT
Start: 2025-03-21 | End: 2026-04-25

## 2025-03-21 NOTE — ASSESSMENT & PLAN NOTE
Not new.  Chronic.  Discussed with patient.  Mainly of her knees and her hands and wrist as per patient.  Patient education.  Patient Voltaren is refilled as per her request and also a higher quantity because she says she needs it.  Orders:  •  Diclofenac Sodium (VOLTAREN) 1 %; Apply 4 g topically 3 (three) times a day

## 2025-03-21 NOTE — ASSESSMENT & PLAN NOTE
Not controlled.  Patient asserts that she did not take her medication for about a month or 2 because she ran out.  Advised patient to not run out of medications and to call us herself instead of letting the pharmacy call us.  And I just sent the medication for 90 days with 3 extra refills for her statin.  Her total cholesterol is 229 now when it was 218 in October last year, triglycerides went down to 183 from 207, HDL is 51 and her LDL went up to 141 from 138.  LFTs normal.  Patient advised to have less fat starches and sweets.  Diet and exercise as tolerated.  Continue current therapy.  Recheck labs in 3 months.  Orders:  •  atorvastatin (LIPITOR) 40 mg tablet; Take 1 tablet (40 mg total) by mouth daily  •  Comprehensive metabolic panel; Future  •  Lipid panel; Future

## 2025-03-21 NOTE — PROGRESS NOTES
Name: Latrice Palacio      : 1945      MRN: [id number]  Encounter Provider: Subhash Leonard MD  Encounter Date: 3/21/2025   Encounter department: [facility]  :  Assessment & Plan  Primary hypertension  Not well-controlled secondary to her anxiety.  Discussed with patient.  Patient advised to check her blood pressure at home.  Low-sodium diet.  Hydrate well.  Patient education regarding anxiety and also addressing her shortness of breath specifically with exertion.  Labs reviewed and recheck labs also in 3 months.  Orders:  •  NIFEdipine ER (ADALAT CC) 30 MG 24 hr tablet; Take 1 tablet (30 mg total) by mouth daily  •  Comprehensive metabolic panel; Future  •  UA w Reflex to Microscopic w Reflex to Culture; Future    Prediabetes  Stable.  But not improved.  Her glucose was 138 and her A1c went up to 6.3 now in March and was 6.2 events over last year.  Is also 6.2 in March last year.  Advised patient to have less starches sweets and fats.  Diet and exercise as tolerated.  Recheck labs again in 3 months.  Orders:  •  Comprehensive metabolic panel; Future  •  Hemoglobin A1C; Future  •  Albumin / creatinine urine ratio; Future    CKD (chronic kidney disease) stage 2, GFR 60-89 ml/min  Lab Results   Component Value Date    EGFR 66 2025    EGFR 70 10/26/2024    EGFR 70 2024    CREATININE 0.89 2025    CREATININE 0.80 10/26/2024    CREATININE 0.81 2024     Stable but slightly decreased since October.  Shared with patient.  Patient advised better blood pressure control better prediabetes control.  Hydrate well.  Recheck labs in 3 months.  Orders:  •  Comprehensive metabolic panel; Future    Mixed hyperlipidemia  Not controlled.  Patient asserts that she did not take her medication for about a month or 2 because she ran out.  Advised patient to not run out of medications and to call us herself instead of letting the pharmacy call us.  And I just sent the medication  for 90 days with 3 extra refills for her statin.  Her total cholesterol is 229 now when it was 218 in October last year, triglycerides went down to 183 from 207, HDL is 51 and her LDL went up to 141 from 138.  LFTs normal.  Patient advised to have less fat starches and sweets.  Diet and exercise as tolerated.  Continue current therapy.  Recheck labs in 3 months.  Orders:  •  atorvastatin (LIPITOR) 40 mg tablet; Take 1 tablet (40 mg total) by mouth daily  •  Comprehensive metabolic panel; Future  •  Lipid panel; Future    Dyspnea on exertion  Not acute or new.  Discussed with patient.  Patient education.  Asthma treatment  discussed with patient.  Patient sent for PFT.  And patient given referral to cardiology to be evaluated.  If acutely worse or changes or symptoms she does not feel comfortable with then patient is to go to the emergency room.  Also check a BNP with her next blood work.  Orders:  •  Ambulatory Referral to Cardiology; Future  •  Complete PFT with post bronchodilator; Future  •  B-Type Natriuretic Peptide(BNP); Future    Arthritis  Not new.  Chronic.  Discussed with patient.  Mainly of her knees and her hands and wrist as per patient.  Patient education.  Patient Voltaren is refilled as per her request and also a higher quantity because she says she needs it.  Orders:  •  Diclofenac Sodium (VOLTAREN) 1 %; Apply 4 g topically 3 (three) times a day    Chronic midline low back pain without sciatica  Discussed with patient.  Manage as per patient.  Denies neuro red flags.       Nocturia  Discussed with patient.  Patient education.  Patient has missed GYN appointment so I gave her another 1 at this time.  Orders:  •  Ambulatory Referral to Obstetrics / Gynecology; Future    Mild persistent asthma without complication  Stable on her maintenance inhaler twice daily and the rescue inhaler as needed.  Patient sent for PFT also.  Orders:  •  Complete PFT with post bronchodilator; Future    Encounter for  screening mammogram for breast cancer  Discussed with patient.  Orders:  •  Mammo screening bilateral w 3d and cad; Future    Encounter for immunization  Patient received the flu vaccine with us today.  Orders:  •  influenza vaccine, high-dose, PF 0.5 mL (Fluzone High Dose)          RTO 3 months to do the blood work and urine before.  Patient can see me prior to that for anything else in between.         History of Present Illness   80-year-old female here for an evaluation of her prediabetes, hyperlipidemia and hypertension.  Patient did blood work and urine recently.  Patient does get nervous at the doctor's office hence why her blood pressure was elevated upon arrival and she says she gets like that also because she gets short of breath as she walks at times.  This is not new or acute.  When I rechecked her blood pressure was 160/80.  And then when I rechecked it right before she left and went up to 180/60 again secondary to her getting anxious about the stuff that we spoke about.  Patient denies any acute cardiovascular neuro or pulmonary symptoms from it.  Patient denies tobacco.  Patient is requesting a refill on her statin, nifedipine and her Voltaren gel for her arthritis.  Patient is requesting more amount of the Voltaren gel because she says she uses it on her knees and her hands for arthritis.  Patient also has nocturia which also is not new or acute.  Patient has asthma which is mild persistent.  Patient asserts that she uses the rescue inhaler as needed and the maintenance inhaler twice daily.  Patient is due for the flu vaccine and she is amenable to getting it with us today.  Patient denies any adverse reactions to vaccines in the past.  And she is due for her mammogram and bone density which have been ordered by me already for her.      Review of Systems   Constitutional:  Negative for chills, fatigue, fever and unexpected weight change.   HENT:  Negative for congestion and sore throat.    Eyes:   "Negative for visual disturbance.   Respiratory:  Positive for shortness of breath. Negative for cough.    Cardiovascular:  Negative for chest pain and palpitations.   Gastrointestinal:  Negative for abdominal pain, blood in stool, nausea and vomiting.   Genitourinary:  Negative for dysuria and hematuria.   Musculoskeletal:  Positive for arthralgias and back pain.   Skin:  Negative for rash.   Neurological:  Negative for dizziness and headaches.   Psychiatric/Behavioral:  Negative for dysphoric mood, self-injury and suicidal ideas. The patient is nervous/anxious.        Objective   /80   Pulse 86   Temp 97.7 °F (36.5 °C) (Temporal)   Resp 16   Ht 5' 2\" (1.575 m)   Wt 62.1 kg (137 lb)   LMP  (LMP Unknown)   SpO2 97%   BMI 25.06 kg/m²      Physical Exam  Vitals reviewed.   Constitutional:       General: She is not in acute distress.     Appearance: Normal appearance. She is not ill-appearing.   HENT:      Head: Normocephalic and atraumatic.      Mouth/Throat:      Mouth: Mucous membranes are moist.      Pharynx: Oropharynx is clear.   Eyes:      Extraocular Movements: Extraocular movements intact.      Conjunctiva/sclera: Conjunctivae normal.   Neck:      Vascular: No carotid bruit.   Cardiovascular:      Rate and Rhythm: Normal rate and regular rhythm.   Pulmonary:      Effort: Pulmonary effort is normal.      Breath sounds: Normal breath sounds.   Musculoskeletal:      Right lower leg: No edema.      Left lower leg: No edema.      Comments: No calf tenderness bilateral.   Lymphadenopathy:      Cervical: No cervical adenopathy.   Skin:     General: Skin is warm.   Neurological:      General: No focal deficit present.      Mental Status: She is alert and oriented to person, place, and time.   Psychiatric:         Mood and Affect: Mood normal.         Behavior: Behavior normal.         Thought Content: Thought content normal.         "

## 2025-03-21 NOTE — ASSESSMENT & PLAN NOTE
Lab Results   Component Value Date    EGFR 66 03/12/2025    EGFR 70 10/26/2024    EGFR 70 03/02/2024    CREATININE 0.89 03/12/2025    CREATININE 0.80 10/26/2024    CREATININE 0.81 03/02/2024     Stable but slightly decreased since October.  Shared with patient.  Patient advised better blood pressure control better prediabetes control.  Hydrate well.  Recheck labs in 3 months.  Orders:  •  Comprehensive metabolic panel; Future

## 2025-03-21 NOTE — ASSESSMENT & PLAN NOTE
Stable on her maintenance inhaler twice daily and the rescue inhaler as needed.  Patient sent for PFT also.  Orders:  •  Complete PFT with post bronchodilator; Future

## 2025-03-21 NOTE — ASSESSMENT & PLAN NOTE
Not well-controlled secondary to her anxiety.  Discussed with patient.  Patient advised to check her blood pressure at home.  Low-sodium diet.  Hydrate well.  Patient education regarding anxiety and also addressing her shortness of breath specifically with exertion.  Labs reviewed and recheck labs also in 3 months.  Orders:  •  NIFEdipine ER (ADALAT CC) 30 MG 24 hr tablet; Take 1 tablet (30 mg total) by mouth daily  •  Comprehensive metabolic panel; Future  •  UA w Reflex to Microscopic w Reflex to Culture; Future

## 2025-03-21 NOTE — ASSESSMENT & PLAN NOTE
Stable.  But not improved.  Her glucose was 138 and her A1c went up to 6.3 now in March and was 6.2 events over last year.  Is also 6.2 in March last year.  Advised patient to have less starches sweets and fats.  Diet and exercise as tolerated.  Recheck labs again in 3 months.  Orders:  •  Comprehensive metabolic panel; Future  •  Hemoglobin A1C; Future  •  Albumin / creatinine urine ratio; Future

## 2025-03-28 ENCOUNTER — TELEPHONE (OUTPATIENT)
Dept: OBGYN CLINIC | Facility: CLINIC | Age: 80
End: 2025-03-28

## 2025-06-04 ENCOUNTER — NURSE TRIAGE (OUTPATIENT)
Age: 80
End: 2025-06-04

## 2025-06-04 NOTE — TELEPHONE ENCOUNTER
REASON FOR CONVERSATION: No Triage Call    SYMPTOMS: Asymptomatic, patient fell and hit head yesterday    OTHER HEALTH INFORMATION: N/A    PROTOCOL DISPOSITION: No Contact Call    CARE ADVICE PROVIDED: N/A    PRACTICE FOLLOW-UP: please follow up with patient         Reason for Disposition   Third attempt to contact caller AND no contact made. Phone number verified.    Protocols used: No Contact or Duplicate Contact Call-Adult-[place]

## 2025-06-04 NOTE — TELEPHONE ENCOUNTER
Regarding: fell hit head, no injury  ----- Message from Melissa PELLETIER sent at 6/4/2025  4:05 PM EDT -----  Patient is primarily Croatian speak, but can speak some English. She states she wanted to make an appt for tomorrow due to her falling in the street yesterday and hitting her head. She said there were no injuries and no pain. She just wanted a doctor to look at it. After the call I felt like a nurse should address.     Thanks

## 2025-06-11 DIAGNOSIS — J45.909 UNCOMPLICATED ASTHMA, UNSPECIFIED ASTHMA SEVERITY, UNSPECIFIED WHETHER PERSISTENT: ICD-10-CM

## 2025-06-11 RX ORDER — FLUTICASONE PROPIONATE 250 UG/1
2 POWDER, METERED RESPIRATORY (INHALATION) 2 TIMES DAILY
Qty: 180 EACH | Refills: 2 | Status: SHIPPED | OUTPATIENT
Start: 2025-06-11

## 2025-07-05 ENCOUNTER — HOSPITAL ENCOUNTER (INPATIENT)
Facility: HOSPITAL | Age: 80
LOS: 4 days | Discharge: HOME | DRG: 872 | End: 2025-07-09
Attending: EMERGENCY MEDICINE | Admitting: HOSPITALIST
Payer: COMMERCIAL

## 2025-07-05 ENCOUNTER — APPOINTMENT (EMERGENCY)
Dept: RADIOLOGY | Facility: HOSPITAL | Age: 80
DRG: 872 | End: 2025-07-05
Payer: COMMERCIAL

## 2025-07-05 DIAGNOSIS — R78.81 BACTEREMIA DUE TO GRAM-NEGATIVE BACTERIA: ICD-10-CM

## 2025-07-05 DIAGNOSIS — A41.9 SEPSIS (HCC): ICD-10-CM

## 2025-07-05 DIAGNOSIS — I10 PRIMARY HYPERTENSION: ICD-10-CM

## 2025-07-05 DIAGNOSIS — R94.31 ABNORMAL EKG: ICD-10-CM

## 2025-07-05 DIAGNOSIS — N12 PYELONEPHRITIS: Primary | ICD-10-CM

## 2025-07-05 LAB
ALBUMIN SERPL BCG-MCNC: 3.9 G/DL (ref 3.5–5)
ALP SERPL-CCNC: 195 U/L (ref 34–104)
ALT SERPL W P-5'-P-CCNC: 73 U/L (ref 7–52)
ANION GAP SERPL CALCULATED.3IONS-SCNC: 16 MMOL/L (ref 4–13)
ANISOCYTOSIS BLD QL SMEAR: PRESENT
APTT PPP: 27 SECONDS (ref 23–34)
AST SERPL W P-5'-P-CCNC: 77 U/L (ref 13–39)
BACTERIA UR QL AUTO: ABNORMAL /HPF
BASOPHILS # BLD MANUAL: 0 THOUSAND/UL (ref 0–0.1)
BASOPHILS NFR MAR MANUAL: 0 % (ref 0–1)
BILIRUB SERPL-MCNC: 0.56 MG/DL (ref 0.2–1)
BILIRUB UR QL STRIP: NEGATIVE
BUN SERPL-MCNC: 32 MG/DL (ref 5–25)
CALCIUM SERPL-MCNC: 9 MG/DL (ref 8.4–10.2)
CHLORIDE SERPL-SCNC: 93 MMOL/L (ref 96–108)
CLARITY UR: ABNORMAL
CO2 SERPL-SCNC: 20 MMOL/L (ref 21–32)
COLOR UR: ABNORMAL
CREAT SERPL-MCNC: 1.23 MG/DL (ref 0.6–1.3)
EOSINOPHIL # BLD MANUAL: 0 THOUSAND/UL (ref 0–0.4)
EOSINOPHIL NFR BLD MANUAL: 0 % (ref 0–6)
ERYTHROCYTE [DISTWIDTH] IN BLOOD BY AUTOMATED COUNT: 13.4 % (ref 11.6–15.1)
FLUAV AG UPPER RESP QL IA.RAPID: NEGATIVE
FLUBV AG UPPER RESP QL IA.RAPID: NEGATIVE
GFR SERPL CREATININE-BSD FRML MDRD: 41 ML/MIN/1.73SQ M
GLUCOSE SERPL-MCNC: 148 MG/DL (ref 65–140)
GLUCOSE UR STRIP-MCNC: NEGATIVE MG/DL
HCT VFR BLD AUTO: 40 % (ref 34.8–46.1)
HGB BLD-MCNC: 13 G/DL (ref 11.5–15.4)
HGB UR QL STRIP.AUTO: ABNORMAL
INR PPP: 1.02 (ref 0.85–1.19)
KETONES UR STRIP-MCNC: ABNORMAL MG/DL
LACTATE SERPL-SCNC: 0.9 MMOL/L (ref 0.5–2)
LEUKOCYTE ESTERASE UR QL STRIP: ABNORMAL
LYMPHOCYTES # BLD AUTO: 0.35 THOUSAND/UL (ref 0.6–4.47)
LYMPHOCYTES # BLD AUTO: 2 % (ref 14–44)
MACROCYTES BLD QL AUTO: PRESENT
MCH RBC QN AUTO: 30.3 PG (ref 26.8–34.3)
MCHC RBC AUTO-ENTMCNC: 32.5 G/DL (ref 31.4–37.4)
MCV RBC AUTO: 93 FL (ref 82–98)
MONOCYTES # BLD AUTO: 0.35 THOUSAND/UL (ref 0–1.22)
MONOCYTES NFR BLD: 2 % (ref 4–12)
MUCOUS THREADS UR QL AUTO: ABNORMAL
NEUTROPHILS # BLD MANUAL: 16.97 THOUSAND/UL (ref 1.85–7.62)
NEUTS BAND NFR BLD MANUAL: 11 % (ref 0–8)
NEUTS SEG NFR BLD AUTO: 85 % (ref 43–75)
NITRITE UR QL STRIP: POSITIVE
NON-SQ EPI CELLS URNS QL MICRO: ABNORMAL /HPF
PH UR STRIP.AUTO: 6 [PH]
PLATELET # BLD AUTO: 218 THOUSANDS/UL (ref 149–390)
PLATELET BLD QL SMEAR: ADEQUATE
PMV BLD AUTO: 11.3 FL (ref 8.9–12.7)
POTASSIUM SERPL-SCNC: 3.9 MMOL/L (ref 3.5–5.3)
PROCALCITONIN SERPL-MCNC: 246.26 NG/ML
PROT SERPL-MCNC: 8 G/DL (ref 6.4–8.4)
PROT UR STRIP-MCNC: ABNORMAL MG/DL
PROTHROMBIN TIME: 13.7 SECONDS (ref 12.3–15)
RBC # BLD AUTO: 4.29 MILLION/UL (ref 3.81–5.12)
RBC #/AREA URNS AUTO: ABNORMAL /HPF
RBC MORPH BLD: PRESENT
SARS-COV+SARS-COV-2 AG RESP QL IA.RAPID: NEGATIVE
SODIUM SERPL-SCNC: 129 MMOL/L (ref 135–147)
SP GR UR STRIP.AUTO: 1.02 (ref 1–1.03)
UROBILINOGEN UR STRIP-ACNC: <2 MG/DL
WBC # BLD AUTO: 17.68 THOUSAND/UL (ref 4.31–10.16)
WBC #/AREA URNS AUTO: ABNORMAL /HPF
WBC TOXIC VACUOLES BLD QL SMEAR: PRESENT

## 2025-07-05 PROCEDURE — 76942 ECHO GUIDE FOR BIOPSY: CPT | Performed by: EMERGENCY MEDICINE

## 2025-07-05 PROCEDURE — 87077 CULTURE AEROBIC IDENTIFY: CPT

## 2025-07-05 PROCEDURE — 81001 URINALYSIS AUTO W/SCOPE: CPT

## 2025-07-05 PROCEDURE — 85007 BL SMEAR W/DIFF WBC COUNT: CPT

## 2025-07-05 PROCEDURE — 87154 CUL TYP ID BLD PTHGN 6+ TRGT: CPT

## 2025-07-05 PROCEDURE — 83690 ASSAY OF LIPASE: CPT | Performed by: HOSPITALIST

## 2025-07-05 PROCEDURE — 36415 COLL VENOUS BLD VENIPUNCTURE: CPT

## 2025-07-05 PROCEDURE — 84100 ASSAY OF PHOSPHORUS: CPT | Performed by: HOSPITALIST

## 2025-07-05 PROCEDURE — 87086 URINE CULTURE/COLONY COUNT: CPT

## 2025-07-05 PROCEDURE — 87804 INFLUENZA ASSAY W/OPTIC: CPT

## 2025-07-05 PROCEDURE — 87186 SC STD MICRODIL/AGAR DIL: CPT

## 2025-07-05 PROCEDURE — 96365 THER/PROPH/DIAG IV INF INIT: CPT

## 2025-07-05 PROCEDURE — 93005 ELECTROCARDIOGRAM TRACING: CPT

## 2025-07-05 PROCEDURE — 99285 EMERGENCY DEPT VISIT HI MDM: CPT

## 2025-07-05 PROCEDURE — 80053 COMPREHEN METABOLIC PANEL: CPT

## 2025-07-05 PROCEDURE — 85730 THROMBOPLASTIN TIME PARTIAL: CPT

## 2025-07-05 PROCEDURE — 82550 ASSAY OF CK (CPK): CPT | Performed by: HOSPITALIST

## 2025-07-05 PROCEDURE — 83605 ASSAY OF LACTIC ACID: CPT

## 2025-07-05 PROCEDURE — 96366 THER/PROPH/DIAG IV INF ADDON: CPT

## 2025-07-05 PROCEDURE — 99285 EMERGENCY DEPT VISIT HI MDM: CPT | Performed by: EMERGENCY MEDICINE

## 2025-07-05 PROCEDURE — 83735 ASSAY OF MAGNESIUM: CPT | Performed by: HOSPITALIST

## 2025-07-05 PROCEDURE — 74177 CT ABD & PELVIS W/CONTRAST: CPT

## 2025-07-05 PROCEDURE — 96368 THER/DIAG CONCURRENT INF: CPT

## 2025-07-05 PROCEDURE — 85610 PROTHROMBIN TIME: CPT

## 2025-07-05 PROCEDURE — 87811 SARS-COV-2 COVID19 W/OPTIC: CPT

## 2025-07-05 PROCEDURE — 36000 PLACE NEEDLE IN VEIN: CPT | Performed by: EMERGENCY MEDICINE

## 2025-07-05 PROCEDURE — 84145 PROCALCITONIN (PCT): CPT

## 2025-07-05 PROCEDURE — 85027 COMPLETE CBC AUTOMATED: CPT

## 2025-07-05 PROCEDURE — 87040 BLOOD CULTURE FOR BACTERIA: CPT

## 2025-07-05 RX ORDER — SODIUM CHLORIDE, SODIUM GLUCONATE, SODIUM ACETATE, POTASSIUM CHLORIDE, MAGNESIUM CHLORIDE, SODIUM PHOSPHATE, DIBASIC, AND POTASSIUM PHOSPHATE .53; .5; .37; .037; .03; .012; .00082 G/100ML; G/100ML; G/100ML; G/100ML; G/100ML; G/100ML; G/100ML
1000 INJECTION, SOLUTION INTRAVENOUS ONCE
Status: COMPLETED | OUTPATIENT
Start: 2025-07-05 | End: 2025-07-05

## 2025-07-05 RX ORDER — ONDANSETRON 4 MG/1
4 TABLET, ORALLY DISINTEGRATING ORAL ONCE
Status: COMPLETED | OUTPATIENT
Start: 2025-07-05 | End: 2025-07-05

## 2025-07-05 RX ORDER — SODIUM CHLORIDE, SODIUM GLUCONATE, SODIUM ACETATE, POTASSIUM CHLORIDE, MAGNESIUM CHLORIDE, SODIUM PHOSPHATE, DIBASIC, AND POTASSIUM PHOSPHATE .53; .5; .37; .037; .03; .012; .00082 G/100ML; G/100ML; G/100ML; G/100ML; G/100ML; G/100ML; G/100ML
1000 INJECTION, SOLUTION INTRAVENOUS ONCE
Status: COMPLETED | OUTPATIENT
Start: 2025-07-05 | End: 2025-07-06

## 2025-07-05 RX ORDER — ACETAMINOPHEN 325 MG/1
650 TABLET ORAL ONCE
Status: COMPLETED | OUTPATIENT
Start: 2025-07-05 | End: 2025-07-05

## 2025-07-05 RX ADMIN — PIPERACILLIN AND TAZOBACTAM 4.5 G: 36; 4.5 INJECTION, POWDER, FOR SOLUTION INTRAVENOUS at 19:10

## 2025-07-05 RX ADMIN — SODIUM CHLORIDE, SODIUM GLUCONATE, SODIUM ACETATE, POTASSIUM CHLORIDE, MAGNESIUM CHLORIDE, SODIUM PHOSPHATE, DIBASIC, AND POTASSIUM PHOSPHATE 1000 ML: .53; .5; .37; .037; .03; .012; .00082 INJECTION, SOLUTION INTRAVENOUS at 20:36

## 2025-07-05 RX ADMIN — ONDANSETRON 4 MG: 4 TABLET, ORALLY DISINTEGRATING ORAL at 21:50

## 2025-07-05 RX ADMIN — ACETAMINOPHEN 650 MG: 325 TABLET ORAL at 22:33

## 2025-07-05 RX ADMIN — IOHEXOL 85 ML: 350 INJECTION, SOLUTION INTRAVENOUS at 19:02

## 2025-07-05 RX ADMIN — SODIUM CHLORIDE, SODIUM GLUCONATE, SODIUM ACETATE, POTASSIUM CHLORIDE, MAGNESIUM CHLORIDE, SODIUM PHOSPHATE, DIBASIC, AND POTASSIUM PHOSPHATE 1000 ML: .53; .5; .37; .037; .03; .012; .00082 INJECTION, SOLUTION INTRAVENOUS at 18:28

## 2025-07-05 NOTE — LETTER
[facility] [address]  [address]  [place] [address]  Dept: [phone]    July 9, 2025     Patient: Latrice Palacio   YOB: 1945   Date of Visit: 7/5/2025       To Whom it May Concern:    Latrice Palacio was under my professional care. She was hospitalized from 7/5/2025 to 07/09/25. She may return to work on Monday 7/14/25    If you have any questions or concerns, please don't hesitate to call.         Sincerely,          Rex Beckford PA-C

## 2025-07-05 NOTE — ED ATTENDING ATTESTATION
7/5/2025  I, Olivier Chao DO, saw and evaluated the patient. I have discussed the patient with the resident/non-physician practitioner and agree with the resident's/non-physician practitioner's findings, Plan of Care, and MDM as documented in the resident's/non-physician practitioner's note, except where noted. All available labs and Radiology studies were reviewed.  I was present for key portions of any procedure(s) performed by the resident/non-physician practitioner and I was immediately available to provide assistance.       At this point I agree with the current assessment done in the Emergency Department.  I have conducted an independent evaluation of this patient a history and physical is as follows:  Chief Complaint   Patient presents with    Fever     Fever, vomiting, diarrhea since Thursday.      Final Diagnosis:  1. Pyelonephritis    2. Sepsis (HCC)            80-year-old female presents for fever abdominal pain diarrhea for 2 days.  May have started after eating a hamburger.  Feels weak.  No urinary symptoms no cough.  Family was unaware that the patient had a fever.    She does not appear toxic.  She does have tenderness in the right lower quadrant and lower abdominal quadrants.  She is tachycardic febrile meeting SIRS criteria    At risk for sepsis, differential includes appendicitis urinary tract infection, viral syndrome.  Doubt pneumonia doubt other associated symptoms plan IV fluids labs reassess for disposition    ED Course     Labs indicative or sepsis without significant end organ damage  Ct review by me and shows pyelo  Given zosyn empirically for presumed intrabdominal source but likely can be narrowed given UTI as source and culture sent  Acidosis present and likely some degree of ketoacidosis given vomiting and not eating  Will be admitted to medicine, reassess and stable    Critical Care Time  Procedures

## 2025-07-05 NOTE — ED PROVIDER NOTES
ED Disposition       None          Assessment & Plan       Medical Decision Making  80-year-old female history of asthma, prediabetes, HTN, HLD who is presenting with multiple episodes of vomiting, diarrhea, myalgias, suprapubic abdominal pain, mild headache x 3 days.    Patient is ill-appearing, hypertensive, tachycardia to 120, and febrile.  On exam patient's lungs are clear, she is tachy but regular, mild suprapubic tenderness, no lower extremity swelling, no rashes, no CVA tenderness.  Patient is meeting SIRS criteria.    Concern for intra-abdominal pathology including septic stone, appendicitis, ischemic bowel, UTI, pneumonia, viral infection.  Will obtain CT abdomen and pelvis to rule out intra-abdominal pathology. Plan for septic workup, antibiotics, IV fluids, antiemetics. Patient will likely be admitted for further management.    Amount and/or Complexity of Data Reviewed  Labs: ordered.  Radiology: ordered and independent interpretation performed.    Risk  OTC drugs.  Prescription drug management.  Decision regarding hospitalization.        ED Course as of 07/05/25 1736   Sat Jul 05, 2025   1735 Patient's UA is positive for nitrites, and leukocytes, will follow-up with CT abdomen and pelvis to evaluate if septic stone is present.       Medications - No data to display    ED Risk Strat Scores                    (ISAR) Identification of Seniors at Risk  Before the illness or injury that brought you to the Emergency, did you need someone to help you on a regular basis?: 0  In the last 24 hours, have you needed more help than usual?: 0  Have you been hospitalized for one or more nights during the past 6 months?: 0  In general, do you see well?: 0  In general, do you have serious problems with your memory?: 0  Do you take more than three different medications every day?: 0  ISAR Score: 0                                History of Present Illness       Chief Complaint   Patient presents with    Fever     Fever,  vomiting, diarrhea since Thursday.        Past Medical History[1]   Past Surgical History[2]   Family History[3]   Social History[4]   E-Cigarette/Vaping    E-Cigarette Use Never User       E-Cigarette/Vaping Substances    Nicotine No     THC No     CBD No     Flavoring No     Other No     Unknown No       I have reviewed and agree with the history as documented.     80-year-old female history of asthma, prediabetes, HTN, HLD who is presenting with multiple episodes of vomiting, diarrhea, myalgias, suprapubic abdominal pain, mild headache x 3 days.  Patient is unsure of how high her fevers were at home.  She last took Tylenol about 5 hours ago with no symptom relief.  She has been unable to tolerate p.o.  She denies chest pain, shortness of breath dysuria, hematuria, rashes, back pain, known sick contacts, recent travel.      History provided by:  Patient and friend  Fever  Associated symptoms: abdominal pain, diarrhea, fever, headaches, myalgias, nausea, rhinorrhea and vomiting    Associated symptoms: no chest pain, no cough, no rash, no shortness of breath and no sore throat        Review of Systems   Constitutional:  Positive for chills and fever.   HENT:  Positive for rhinorrhea. Negative for sore throat.    Respiratory:  Negative for cough and shortness of breath.    Cardiovascular:  Negative for chest pain.   Gastrointestinal:  Positive for abdominal pain, diarrhea, nausea and vomiting.   Musculoskeletal:  Positive for myalgias. Negative for back pain.   Skin:  Negative for rash.   Neurological:  Positive for headaches.           Objective       ED Triage Vitals [07/05/25 1458]   Temperature Pulse Blood Pressure Respirations SpO2 Patient Position - Orthostatic VS   (!) 101.6 °F (38.7 °C) (!) 120 (!) 180/74 20 98 % --      Temp src Heart Rate Source BP Location FiO2 (%) Pain Score    -- Monitor Left arm -- --      Vitals      Date and Time Temp Pulse SpO2 Resp BP Pain Score FACES Pain Rating User   07/05/25  1458 101.6 °F (38.7 °C) 120 98 % 20 180/74 -- -- MARLEY            Physical Exam  Constitutional:       General: She is not in acute distress.     Appearance: Normal appearance. She is ill-appearing.   HENT:      Head: Normocephalic and atraumatic.      Mouth/Throat:      Mouth: Mucous membranes are dry.     Cardiovascular:      Rate and Rhythm: Regular rhythm. Tachycardia present.      Pulses: Normal pulses.      Heart sounds: Normal heart sounds. No murmur heard.     No friction rub. No gallop.   Pulmonary:      Effort: Pulmonary effort is normal.      Breath sounds: Normal breath sounds. No wheezing, rhonchi or rales.   Abdominal:      General: Abdomen is flat. There is no distension.      Palpations: Abdomen is soft. There is no mass.      Tenderness: There is abdominal tenderness in the suprapubic area.     Musculoskeletal:         General: Normal range of motion.      Cervical back: Normal range of motion and neck supple. No rigidity.     Skin:     General: Skin is warm and dry.     Neurological:      Mental Status: She is alert. Mental status is at baseline.     Psychiatric:         Mood and Affect: Mood normal.         Behavior: Behavior normal.         Results Reviewed       None            No orders to display       Complex Venous Access Line    Date/Time: 7/6/2025 12:42 AM    Performed by: Leslie Gurrola DO  Authorized by: Leslie Gurrola DO    Patient location:  ED  Other Assisting Provider: Yes (comment) (Gavi Reyes)    Consent:     Consent obtained:  Verbal    Consent given by:  Patient    Risks discussed:  Incorrect placement and bleeding    Alternatives discussed:  Delayed treatment  Procedure details:     Complex Venous Access Line Type: US Guided Peripheral IV      Peripheral IV Indications comment:  Difficulty obtaining IV access    Orientation:  Left    Location:  Antecubital    Catheter size:  18 gauge    Approach: percutaneous technique used      Patient position:  Flat    Ultrasound image  availability:  Images available in PACS    Sterile ultrasound techniques: Sterile gel and sterile probe covers were used      Number of attempts:  1    Successful placement: yes      Landmarks identified: yes        ED Medication and Procedure Management   Prior to Admission Medications   Prescriptions Last Dose Informant Patient Reported? Taking?   Ascorbic Acid (vitamin C) 1000 MG tablet   Yes No   Sig: Take 1,000 mg by mouth daily   Calcium 500 MG tablet  Self Yes No   Sig: Take 1 tablet by mouth daily   Diclofenac Sodium (VOLTAREN) 1 %   No No   Sig: Apply 4 g topically 3 (three) times a day   Fluticasone Propionate, Inhal, (Fluticasone Propionate Diskus) 250 MCG/ACT AEPB   No No   Sig: INHALE 2 PUFFS BY MOUTH TWICE A DAY RINSE MOUTH AFTER USE   NIFEdipine ER (ADALAT CC) 30 MG 24 hr tablet   No No   Sig: Take 1 tablet (30 mg total) by mouth daily   Omega-3 1000 MG CAPS  Self Yes No   Sig: Take by mouth   albuterol (Ventolin HFA) 90 mcg/act inhaler   No No   Sig: Inhale 2 puffs every 6 (six) hours as needed for wheezing or shortness of breath   alendronate (FOSAMAX) 70 mg tablet   No No   Sig: Take 1 tablet (70 mg total) by mouth every 7 days   atorvastatin (LIPITOR) 40 mg tablet   No No   Sig: Take 1 tablet (40 mg total) by mouth daily   capsicum (ZOSTRIX) 0.075 % topical cream   No No   Sig: Apply topically 3 (three) times a day To the affected area   cholecalciferol (VITAMIN D3) 1,000 units tablet  Self Yes No   Sig: Take 1 tablet by mouth daily   magnesium gluconate (MAGONATE) 500 mg tablet  Self Yes No   Sig: Take 500 mg by mouth 2 (two) times a day      Facility-Administered Medications: None     Patient's Medications   Discharge Prescriptions    No medications on file     No discharge procedures on file.  ED SEPSIS DOCUMENTATION                [1]   Past Medical History:  Diagnosis Date    Depression     Plantar fasciitis 04/25/2013    Vitamin D deficiency    [2]   Past Surgical History:  Procedure  Laterality Date     SECTION      HIP SURGERY Left 2014    Left anterior total hip arthroplasty   [3]   Family History  Problem Relation Name Age of Onset    Thyroid cancer Mother      Diabetes Father          Diabetes Mellitus    No Known Problems Daughter      No Known Problems Daughter      No Known Problems Daughter      No Known Problems Maternal Grandmother      No Known Problems Maternal Grandfather      No Known Problems Paternal Grandmother      No Known Problems Paternal Grandfather      No Known Problems Maternal Aunt     [4]   Social History  Tobacco Use    Smoking status: Never    Smokeless tobacco: Never   Vaping Use    Vaping status: Never Used   Substance Use Topics    Alcohol use: No    Drug use: No        Leslie Gurrola DO  [id number]

## 2025-07-05 NOTE — Clinical Note
Case was discussed with MADHU and the patient's admission status was agreed to be Admission Status: inpatient status to the service of Dr. Uriarte

## 2025-07-06 ENCOUNTER — APPOINTMENT (INPATIENT)
Dept: RADIOLOGY | Facility: HOSPITAL | Age: 80
DRG: 872 | End: 2025-07-06
Payer: COMMERCIAL

## 2025-07-06 PROBLEM — R78.81 BACTEREMIA DUE TO GRAM-NEGATIVE BACTERIA: Status: ACTIVE | Noted: 2025-07-06

## 2025-07-06 PROBLEM — R94.31 ABNORMAL EKG: Status: ACTIVE | Noted: 2025-07-06

## 2025-07-06 PROBLEM — M79.10 MYALGIA: Status: ACTIVE | Noted: 2025-07-06

## 2025-07-06 PROBLEM — N17.9 AKI (ACUTE KIDNEY INJURY) (HCC): Status: ACTIVE | Noted: 2025-07-06

## 2025-07-06 PROBLEM — N12 PYELONEPHRITIS: Status: ACTIVE | Noted: 2025-07-06

## 2025-07-06 PROBLEM — A41.9 SEPSIS (HCC): Status: ACTIVE | Noted: 2025-07-06

## 2025-07-06 PROBLEM — R10.11 ABDOMINAL PAIN, RUQ (RIGHT UPPER QUADRANT): Status: ACTIVE | Noted: 2025-07-06

## 2025-07-06 PROBLEM — E87.8 ELECTROLYTE IMBALANCE: Status: ACTIVE | Noted: 2025-07-06

## 2025-07-06 LAB
2HR DELTA HS TROPONIN: -37 NG/L
4HR DELTA HS TROPONIN: -170 NG/L
ALBUMIN SERPL BCG-MCNC: 3.3 G/DL (ref 3.5–5)
ALP SERPL-CCNC: 189 U/L (ref 34–104)
ALT SERPL W P-5'-P-CCNC: 51 U/L (ref 7–52)
ANION GAP SERPL CALCULATED.3IONS-SCNC: 13 MMOL/L (ref 4–13)
AST SERPL W P-5'-P-CCNC: 52 U/L (ref 13–39)
ATRIAL RATE: 122 BPM
BILIRUB SERPL-MCNC: 0.41 MG/DL (ref 0.2–1)
BUN SERPL-MCNC: 21 MG/DL (ref 5–25)
CALCIUM ALBUM COR SERPL-MCNC: 8.6 MG/DL (ref 8.3–10.1)
CALCIUM SERPL-MCNC: 8 MG/DL (ref 8.4–10.2)
CARDIAC TROPONIN I PNL SERPL HS: 638 NG/L (ref ?–50)
CARDIAC TROPONIN I PNL SERPL HS: 771 NG/L (ref ?–50)
CARDIAC TROPONIN I PNL SERPL HS: 808 NG/L (ref ?–50)
CHLORIDE SERPL-SCNC: 98 MMOL/L (ref 96–108)
CK SERPL-CCNC: 636 U/L (ref 26–192)
CO2 SERPL-SCNC: 23 MMOL/L (ref 21–32)
CREAT SERPL-MCNC: 1.02 MG/DL (ref 0.6–1.3)
CRP SERPL QL: 409.3 MG/L
ERYTHROCYTE [DISTWIDTH] IN BLOOD BY AUTOMATED COUNT: 13.5 % (ref 11.6–15.1)
ERYTHROCYTE [SEDIMENTATION RATE] IN BLOOD: >130 MM/HOUR (ref 0–29)
GFR SERPL CREATININE-BSD FRML MDRD: 52 ML/MIN/1.73SQ M
GLUCOSE SERPL-MCNC: 120 MG/DL (ref 65–140)
HCT VFR BLD AUTO: 36.2 % (ref 34.8–46.1)
HGB BLD-MCNC: 11.6 G/DL (ref 11.5–15.4)
LIPASE SERPL-CCNC: 32 U/L (ref 11–82)
MAGNESIUM SERPL-MCNC: 2.2 MG/DL (ref 1.9–2.7)
MCH RBC QN AUTO: 29.7 PG (ref 26.8–34.3)
MCHC RBC AUTO-ENTMCNC: 32 G/DL (ref 31.4–37.4)
MCV RBC AUTO: 93 FL (ref 82–98)
NRBC BLD AUTO-RTO: 0 /100 WBCS
P AXIS: 62 DEGREES
PHOSPHATE SERPL-MCNC: 2.4 MG/DL (ref 2.3–4.1)
PLATELET # BLD AUTO: 202 THOUSANDS/UL (ref 149–390)
PMV BLD AUTO: 10.7 FL (ref 8.9–12.7)
POTASSIUM SERPL-SCNC: 3.3 MMOL/L (ref 3.5–5.3)
PR INTERVAL: 170 MS
PROT SERPL-MCNC: 6.9 G/DL (ref 6.4–8.4)
QRS AXIS: -59 DEGREES
QRSD INTERVAL: 74 MS
QT INTERVAL: 298 MS
QTC INTERVAL: 425 MS
RBC # BLD AUTO: 3.91 MILLION/UL (ref 3.81–5.12)
SODIUM SERPL-SCNC: 134 MMOL/L (ref 135–147)
T WAVE AXIS: 49 DEGREES
VENTRICULAR RATE: 122 BPM
WBC # BLD AUTO: 20.7 THOUSAND/UL (ref 4.31–10.16)

## 2025-07-06 PROCEDURE — 76705 ECHO EXAM OF ABDOMEN: CPT

## 2025-07-06 PROCEDURE — 85027 COMPLETE CBC AUTOMATED: CPT | Performed by: HOSPITALIST

## 2025-07-06 PROCEDURE — 86140 C-REACTIVE PROTEIN: CPT | Performed by: HOSPITALIST

## 2025-07-06 PROCEDURE — RECHECK: Performed by: INTERNAL MEDICINE

## 2025-07-06 PROCEDURE — 84484 ASSAY OF TROPONIN QUANT: CPT | Performed by: HOSPITALIST

## 2025-07-06 PROCEDURE — 99223 1ST HOSP IP/OBS HIGH 75: CPT | Performed by: HOSPITALIST

## 2025-07-06 PROCEDURE — 80053 COMPREHEN METABOLIC PANEL: CPT | Performed by: HOSPITALIST

## 2025-07-06 PROCEDURE — 93005 ELECTROCARDIOGRAM TRACING: CPT

## 2025-07-06 PROCEDURE — 85652 RBC SED RATE AUTOMATED: CPT | Performed by: HOSPITALIST

## 2025-07-06 PROCEDURE — 93010 ELECTROCARDIOGRAM REPORT: CPT | Performed by: INTERNAL MEDICINE

## 2025-07-06 RX ORDER — SODIUM CHLORIDE 9 MG/ML
125 INJECTION, SOLUTION INTRAVENOUS CONTINUOUS
Status: DISCONTINUED | OUTPATIENT
Start: 2025-07-06 | End: 2025-07-07

## 2025-07-06 RX ORDER — ATORVASTATIN CALCIUM 40 MG/1
40 TABLET, FILM COATED ORAL DAILY
Status: DISCONTINUED | OUTPATIENT
Start: 2025-07-06 | End: 2025-07-09 | Stop reason: HOSPADM

## 2025-07-06 RX ORDER — POTASSIUM CHLORIDE 1500 MG/1
40 TABLET, EXTENDED RELEASE ORAL 2 TIMES DAILY
Status: COMPLETED | OUTPATIENT
Start: 2025-07-06 | End: 2025-07-06

## 2025-07-06 RX ORDER — ASPIRIN 81 MG/1
81 TABLET ORAL DAILY
Status: DISCONTINUED | OUTPATIENT
Start: 2025-07-06 | End: 2025-07-09 | Stop reason: HOSPADM

## 2025-07-06 RX ORDER — NIFEDIPINE 30 MG/1
30 TABLET, EXTENDED RELEASE ORAL DAILY
Status: DISCONTINUED | OUTPATIENT
Start: 2025-07-06 | End: 2025-07-09 | Stop reason: HOSPADM

## 2025-07-06 RX ORDER — METOPROLOL TARTRATE 25 MG/1
25 TABLET, FILM COATED ORAL EVERY 12 HOURS SCHEDULED
Status: DISCONTINUED | OUTPATIENT
Start: 2025-07-06 | End: 2025-07-07

## 2025-07-06 RX ORDER — SACCHAROMYCES BOULARDII 250 MG
250 CAPSULE ORAL 2 TIMES DAILY
Status: DISCONTINUED | OUTPATIENT
Start: 2025-07-06 | End: 2025-07-09 | Stop reason: HOSPADM

## 2025-07-06 RX ORDER — ENOXAPARIN SODIUM 100 MG/ML
40 INJECTION SUBCUTANEOUS DAILY
Status: DISCONTINUED | OUTPATIENT
Start: 2025-07-06 | End: 2025-07-09 | Stop reason: HOSPADM

## 2025-07-06 RX ORDER — PANTOPRAZOLE SODIUM 40 MG/10ML
40 INJECTION, POWDER, LYOPHILIZED, FOR SOLUTION INTRAVENOUS
Status: DISCONTINUED | OUTPATIENT
Start: 2025-07-06 | End: 2025-07-07

## 2025-07-06 RX ORDER — ACETAMINOPHEN 325 MG/1
650 TABLET ORAL EVERY 6 HOURS PRN
Status: DISCONTINUED | OUTPATIENT
Start: 2025-07-06 | End: 2025-07-09 | Stop reason: HOSPADM

## 2025-07-06 RX ORDER — ALBUTEROL SULFATE 90 UG/1
2 INHALANT RESPIRATORY (INHALATION) EVERY 6 HOURS PRN
Status: DISCONTINUED | OUTPATIENT
Start: 2025-07-06 | End: 2025-07-09 | Stop reason: HOSPADM

## 2025-07-06 RX ORDER — ONDANSETRON 2 MG/ML
4 INJECTION INTRAMUSCULAR; INTRAVENOUS EVERY 6 HOURS PRN
Status: DISCONTINUED | OUTPATIENT
Start: 2025-07-06 | End: 2025-07-09 | Stop reason: HOSPADM

## 2025-07-06 RX ORDER — ASCORBIC ACID 500 MG
1000 TABLET ORAL DAILY
Status: DISCONTINUED | OUTPATIENT
Start: 2025-07-06 | End: 2025-07-09 | Stop reason: HOSPADM

## 2025-07-06 RX ADMIN — POTASSIUM CHLORIDE 40 MEQ: 1500 TABLET, EXTENDED RELEASE ORAL at 09:22

## 2025-07-06 RX ADMIN — SODIUM CHLORIDE 125 ML/HR: 0.9 INJECTION, SOLUTION INTRAVENOUS at 13:16

## 2025-07-06 RX ADMIN — Medication 500 MG: at 21:08

## 2025-07-06 RX ADMIN — ENOXAPARIN SODIUM 40 MG: 40 INJECTION SUBCUTANEOUS at 08:41

## 2025-07-06 RX ADMIN — PANTOPRAZOLE SODIUM 40 MG: 40 INJECTION, POWDER, FOR SOLUTION INTRAVENOUS at 08:41

## 2025-07-06 RX ADMIN — PIPERACILLIN AND TAZOBACTAM 4.5 G: 36; 4.5 INJECTION, POWDER, FOR SOLUTION INTRAVENOUS at 21:08

## 2025-07-06 RX ADMIN — PIPERACILLIN AND TAZOBACTAM 4.5 G: 36; 4.5 INJECTION, POWDER, FOR SOLUTION INTRAVENOUS at 13:16

## 2025-07-06 RX ADMIN — Medication 250 MG: at 17:13

## 2025-07-06 RX ADMIN — ASPIRIN 81 MG: 81 TABLET ORAL at 10:33

## 2025-07-06 RX ADMIN — ACETAMINOPHEN 650 MG: 325 TABLET ORAL at 19:33

## 2025-07-06 RX ADMIN — METOPROLOL TARTRATE 25 MG: 25 TABLET, FILM COATED ORAL at 10:33

## 2025-07-06 RX ADMIN — FLUTICASONE FUROATE 1 PUFF: 200 POWDER RESPIRATORY (INHALATION) at 08:51

## 2025-07-06 RX ADMIN — POTASSIUM CHLORIDE 40 MEQ: 1500 TABLET, EXTENDED RELEASE ORAL at 17:13

## 2025-07-06 RX ADMIN — PIPERACILLIN AND TAZOBACTAM 4.5 G: 36; 4.5 INJECTION, POWDER, FOR SOLUTION INTRAVENOUS at 05:14

## 2025-07-06 RX ADMIN — OXYCODONE HYDROCHLORIDE AND ACETAMINOPHEN 1000 MG: 500 TABLET ORAL at 08:41

## 2025-07-06 RX ADMIN — NIFEDIPINE 30 MG: 30 TABLET, FILM COATED, EXTENDED RELEASE ORAL at 08:41

## 2025-07-06 RX ADMIN — Medication 250 MG: at 08:41

## 2025-07-06 RX ADMIN — METOPROLOL TARTRATE 25 MG: 25 TABLET, FILM COATED ORAL at 21:08

## 2025-07-06 RX ADMIN — ATORVASTATIN CALCIUM 40 MG: 40 TABLET, FILM COATED ORAL at 08:41

## 2025-07-06 RX ADMIN — ONDANSETRON 4 MG: 2 INJECTION, SOLUTION INTRAMUSCULAR; INTRAVENOUS at 05:10

## 2025-07-06 RX ADMIN — SODIUM CHLORIDE 125 ML/HR: 0.9 INJECTION, SOLUTION INTRAVENOUS at 05:07

## 2025-07-06 NOTE — ASSESSMENT & PLAN NOTE
Present admission as evidenced by tachycardia and leukocytosis.  In the setting of pyelonephritis.  Received fluids per sepsis protocol.  Started on IV Zosyn upon admission  Lactic acid negative.  Procalcitonin markedly elevated.  Will trend  Blood cultures admission both sets positive for gram-negative rods.  Await final culture results.  De-escalate antibiotics based on culture results.  Continue with IV fluids

## 2025-07-06 NOTE — ASSESSMENT & PLAN NOTE
HERMINIA on CKD  Likely prerenal with decreased PO intake and elevated BUN  IVF  Repeat labs and if not improving may need further work up    Lab Results   Component Value Date    EGFR 52 07/06/2025    EGFR 41 07/05/2025    EGFR 66 03/12/2025    CREATININE 1.02 07/06/2025    CREATININE 1.23 07/05/2025    CREATININE 0.89 03/12/2025

## 2025-07-06 NOTE — ASSESSMENT & PLAN NOTE
Likely in the setting of sepsis.  CT without any gallbladder pathology.  Follow-up on right upper quadrant ultrasound.  LFTs improving

## 2025-07-06 NOTE — ASSESSMENT & PLAN NOTE
HERMINIA on CKD  Likely prerenal with decreased PO intake and elevated BUN  IVF  Repeat labs and if not improving may need further work up  Avoid nephrotoxic agents including voltaren gel

## 2025-07-06 NOTE — H&P
H&P - Hospitalist   Name: Latrice Palacio 80 y.o. female I MRN: [id number]  Unit/Bed#: [facility] 621-01 I Date of Admission: 7/5/2025   Date of Service: 7/6/2025 I Hospital Day: 1     Assessment & Plan  Pyelonephritis  Cont zosyn started in ED (will cover GB as well if there is anything there on US)  F/U urine cx  Acid reflux disease  IV PPI  Probiotics while on antibiotics  Hyperlipidemia  Cont statin  CHeck CPK with myalgias  CKD (chronic kidney disease) stage 2, GFR 60-89 ml/min  HERMINIA on CKD  Likely prerenal with decreased PO intake and elevated BUN  IVF  Repeat labs and if not improving may need further work up    Lab Results   Component Value Date    EGFR 41 07/05/2025    EGFR 66 03/12/2025    EGFR 70 10/26/2024    CREATININE 1.23 07/05/2025    CREATININE 0.89 03/12/2025    CREATININE 0.80 10/26/2024     HERMINIA (acute kidney injury) (HCC)  HERMINIA on CKD  Likely prerenal with decreased PO intake and elevated BUN  IVF  Repeat labs and if not improving may need further work up  Avoid nephrotoxic agents including voltaren gel  Myalgia  Check ESR, CRP, CPK  Abdominal pain, RUQ (right upper quadrant)  RUQ US  Abnormal EKG  Telemetry  Trops      VTE Pharmacologic Prophylaxis:   Moderate Risk (Score 3-4) - Pharmacological DVT Prophylaxis Ordered: enoxaparin (Lovenox).  Code Status: DNR/DNI  Discussion with family: Patient declined call to .     Anticipated Length of Stay: Patient will be admitted on an inpatient basis with an anticipated length of stay of greater than 2 midnights secondary to pyelonephritis, HERMINIA.    History of Present Illness   Chief Complaint: abdominal pain, nausea, vomiting    Latrice Palacio is a 80 y.o. female with a PMH of HTN, prediabetes, hyperlipidemia, depression who presents with suprapubic abdominal pain, nausea, vomiting, myalgia and headaches.    Patient states that she had a hamburger and then she was vomiting that and nachos. She felt like that Friday and Saturday so she came in. She had  chills as well. She had pain in her legs and her lower stomach and her arms.No back pain. She had some fevers.     Review of Systems   Constitutional:  Positive for appetite change, chills, fatigue and fever. Negative for diaphoresis.   HENT:  Positive for rhinorrhea. Negative for congestion, ear pain, sore throat, trouble swallowing and voice change.    Eyes:  Negative for visual disturbance.   Respiratory:  Positive for cough. Negative for chest tightness, shortness of breath and wheezing.    Cardiovascular:  Positive for palpitations. Negative for chest pain and leg swelling.   Gastrointestinal:  Positive for abdominal pain, diarrhea, nausea and vomiting. Negative for blood in stool and constipation.   Endocrine: Negative for cold intolerance, heat intolerance, polydipsia, polyphagia and polyuria.   Genitourinary:  Positive for decreased urine volume. Negative for dysuria and frequency.   Musculoskeletal:  Positive for myalgias. Negative for back pain.   Skin:  Negative for rash and wound.   Allergic/Immunologic: Positive for environmental allergies and food allergies.   Neurological:  Positive for light-headedness, numbness and headaches. Negative for dizziness, tremors, seizures, syncope and weakness.        Sometimes tingling in her hands and feet   Hematological:  Negative for adenopathy. Bruises/bleeds easily.   Psychiatric/Behavioral:  Negative for dysphoric mood. The patient is not nervous/anxious.        Historical Information   Past Medical History[1]  Past Surgical History[2]  Social History[3]  E-Cigarette/Vaping    E-Cigarette Use Never User      E-Cigarette/Vaping Substances    Nicotine No     THC No     CBD No     Flavoring No     Other No     Unknown No      Family History[4]  Social History:  Marital Status: Single   Patient Pre-hospital Living Situation: Home alone  Patient Pre-hospital Level of Mobility: walks  Patient Pre-hospital Diet Restrictions: low cholesterol diet    Meds/Allergies   I  have reviewed home medications with patient personally.  Prior to Admission medications    Medication Sig Start Date End Date Taking? Authorizing Provider   albuterol (Ventolin HFA) 90 mcg/act inhaler Inhale 2 puffs every 6 (six) hours as needed for wheezing or shortness of breath 7/24/24  Yes Subhash Leonard MD   alendronate (FOSAMAX) 70 mg tablet Take 1 tablet (70 mg total) by mouth every 7 days 10/31/24  Yes Subhash Leonard MD   Ascorbic Acid (vitamin C) 1000 MG tablet Take 1,000 mg by mouth in the morning.   Yes Historical Provider, MD   atorvastatin (LIPITOR) 40 mg tablet Take 1 tablet (40 mg total) by mouth daily 3/21/25  Yes Subhash Leonard MD   Calcium 500 MG tablet Take 1 tablet by mouth in the morning. 4/18/16  Yes Historical Provider, MD   cholecalciferol (VITAMIN D3) 1,000 units tablet Take 1 tablet by mouth in the morning.   Yes Historical Provider, MD   Diclofenac Sodium (VOLTAREN) 1 % Apply 4 g topically 3 (three) times a day 3/21/25  Yes Subhash Leonard MD   Fluticasone Propionate, Inhal, (Fluticasone Propionate Diskus) 250 MCG/ACT AEPB INHALE 2 PUFFS BY MOUTH TWICE A DAY RINSE MOUTH AFTER USE 6/11/25  Yes Subhash Leonard MD   magnesium gluconate (MAGONATE) 500 mg tablet Take 500 mg by mouth in the morning and 500 mg in the evening.   Yes Historical Provider, MD   NIFEdipine ER (ADALAT CC) 30 MG 24 hr tablet Take 1 tablet (30 mg total) by mouth daily 3/21/25 4/25/26 Yes Subhash Leonard MD   [place]-3 1000 MG CAPS Take by mouth   Yes Historical Provider, MD   capsicum (ZOSTRIX) 0.075 % topical cream Apply topically 3 (three) times a day To the affected area  Patient not taking: Reported on 7/6/2025 10/31/24   Subhash Leonard MD     Allergies   Allergen Reactions    Eggs Or Egg-Derived Products - Food Allergy Diarrhea       Objective :  Temp:  [99.5 °F (37.5 °C)-101.6 °F (38.7 °C)] 99.5 °F (37.5 °C)  HR:  [103-122] 103  BP: (851-191)/(58-88)  150/81  Resp:  [12-20] 16  SpO2:  [95 %-100 %] 95 %  O2 Device: None (Room air)    Physical Exam  Constitutional:       General: She is not in acute distress.     Appearance: Normal appearance. She is not ill-appearing or diaphoretic.   HENT:      Head: Normocephalic and atraumatic.      Mouth/Throat:      Mouth: Mucous membranes are dry.      Pharynx: Oropharynx is clear. No oropharyngeal exudate or posterior oropharyngeal erythema.     Eyes:      General: No scleral icterus.     Extraocular Movements: Extraocular movements intact.      Conjunctiva/sclera: Conjunctivae normal.      Pupils: Pupils are equal, round, and reactive to light.     Neck:      Vascular: No carotid bruit.     Cardiovascular:      Rate and Rhythm: Normal rate and regular rhythm.      Pulses: Normal pulses.      Heart sounds: Normal heart sounds. No murmur heard.     No friction rub. No gallop.   Pulmonary:      Effort: Pulmonary effort is normal. No respiratory distress.      Breath sounds: Normal breath sounds. No wheezing, rhonchi or rales.   Abdominal:      General: Abdomen is flat. There is no distension.      Palpations: Abdomen is soft. There is no mass.      Tenderness: There is abdominal tenderness. There is right CVA tenderness, left CVA tenderness and guarding. There is no rebound.      Comments: She has lower abdomen tenderness and RIght CVA>left CVA tenderness but her worst area for pain is in the RUQ over her GB     Musculoskeletal:         General: Tenderness present. No swelling or deformity.      Cervical back: Neck supple. No rigidity or tenderness.      Right lower leg: No edema.      Left lower leg: No edema.   Lymphadenopathy:      Cervical: No cervical adenopathy.     Skin:     General: Skin is warm and dry.      Coloration: Skin is not jaundiced.      Findings: No bruising or lesion.     Neurological:      General: No focal deficit present.      Mental Status: She is alert and oriented to person, place, and time.       Cranial Nerves: No cranial nerve deficit.      Sensory: No sensory deficit.      Motor: No weakness.     Psychiatric:         Mood and Affect: Mood normal.         Behavior: Behavior normal.         Thought Content: Thought content normal.         Judgment: Judgment normal.              Lab Results: I have reviewed the following results:  Results from last 7 days   Lab Units 07/05/25  1632   WBC Thousand/uL 17.68*   HEMOGLOBIN g/dL 13.0   HEMATOCRIT % 40.0   PLATELETS Thousands/uL 218   BANDS PCT % 11*   LYMPHO PCT % 2*   MONO PCT % 2*   EOS PCT % 0     Results from last 7 days   Lab Units 07/05/25  1632   SODIUM mmol/L 129*   POTASSIUM mmol/L 3.9   CHLORIDE mmol/L 93*   CO2 mmol/L 20*   BUN mg/dL 32*   CREATININE mg/dL 1.23   ANION GAP mmol/L 16*   CALCIUM mg/dL 9.0   ALBUMIN g/dL 3.9   TOTAL BILIRUBIN mg/dL 0.56   ALK PHOS U/L 195*   ALT U/L 73*   AST U/L 77*   GLUCOSE RANDOM mg/dL 148*     Results from last 7 days   Lab Units 07/05/25  1632   INR  1.02         Lab Results   Component Value Date    HGBA1C 6.3 (H) 03/12/2025    HGBA1C 6.2 (H) 10/26/2024    HGBA1C 6.2 (H) 03/02/2024     Results from last 7 days   Lab Units 07/05/25  1823 07/05/25  1632   LACTIC ACID mmol/L 0.9  --    PROCALCITONIN ng/ml  --  246.26*       Imaging Results Review: I reviewed radiology reports from this admission including: CT abdomen/pelvis.  CT ABDOMEN AND PELVIS WITH IV CONTRAST     INDICATION: RLQ pain. .     COMPARISON: None.     TECHNIQUE: CT examination of the abdomen and pelvis was performed. Multiplanar 2D reformatted images were created from the source data.     This examination, like all CT scans performed in the [facility], was performed utilizing techniques to minimize radiation dose exposure, including the use of iterative reconstruction and automated exposure control. Radiation dose length   product (DLP) for this visit: 316.03 mGy-cm.     IV Contrast: 85 mL of iohexol (OMNIPAQUE)  Enteric Contrast: Not  administered.     FINDINGS:     ABDOMEN     LOWER CHEST: No consolidative airspace opacity. No pleural effusion. Calcified granuloma at the right lower lobe.     LIVER/BILIARY TREE: Unremarkable.     GALLBLADDER: No calcified gallstones. No pericholecystic inflammatory change.     SPLEEN: Unremarkable.     PANCREAS: Unremarkable.     ADRENAL GLANDS: Mild thickening of left adrenal glands, likely reflecting hyperplasia.     KIDNEYS/URETERS: The right kidney is edematous in appearance. There is right perinephric stranding. No obstructing calcification along the course of either ureter. Finding could be on the basis of recently passed stone. Pyelonephritis of   infectious/inflammatory etiology is in the differential. Correlate clinically and with urinalysis.     STOMACH AND BOWEL: Small hiatal hernia. No dilated loops of small bowel to suggest mechanical bowel obstruction. Scattered diverticulosis without evidence for acute diverticulitis. No evidence for colitis.     APPENDIX: No findings to suggest appendicitis.     ABDOMINOPELVIC CAVITY: No free air. No large volume ascites.     VESSELS: Atherosclerosis without abdominal aortic aneurysm.     PELVIS     REPRODUCTIVE ORGANS: Unremarkable for patient's age.     URINARY BLADDER: Unremarkable.     ABDOMINAL WALL/INGUINAL REGIONS: Scarring is present at the ventral midline abdominal wall.     BONES: No acute fracture or suspicious osseous lesion. Spinal degenerative changes. Right hip prosthesis which appears to be in satisfactory alignment.        IMPRESSION:     The right kidney is edematous in appearance. There is right perinephric stranding. No obstructing calcification along the course of either ureter. Finding could be on the basis of recently passed stone. Pyelonephritis of infectious/inflammatory etiology   is in the differential. Correlate clinically and with urinalysis.     Other Study Results Review: EKG was reviewed.  EKG with changes concerning for septal MI  which is new from prior.     Administrative Statements   I have spent a total time of 68 minutes in caring for this patient on the day of the visit/encounter including Diagnostic results, Prognosis, Risks and benefits of tx options, Instructions for management, Patient and family education, Importance of tx compliance, Risk factor reductions, Impressions, Counseling / Coordination of care, Documenting in the medical record, Reviewing/placing orders in the medical record (including tests, medications, and/or procedures), Obtaining or reviewing history  , and Communicating with other healthcare professionals .    ** Please Note: This note has been constructed using a voice recognition system. **         [1]   Past Medical History:  Diagnosis Date    Depression     Plantar fasciitis 2013    Vitamin D deficiency    [2]   Past Surgical History:  Procedure Laterality Date     SECTION      HIP SURGERY Left 2014    Left anterior total hip arthroplasty   [3]   Social History  Tobacco Use    Smoking status: Never    Smokeless tobacco: Never   Vaping Use    Vaping status: Never Used   Substance and Sexual Activity    Alcohol use: No    Drug use: No    Sexual activity: Not Currently   [4]   Family History  Problem Relation Name Age of Onset    Thyroid cancer Mother      Diabetes Father          Diabetes Mellitus    No Known Problems Daughter      No Known Problems Daughter      No Known Problems Daughter      No Known Problems Maternal Grandmother      No Known Problems Maternal Grandfather      No Known Problems Paternal Grandmother      No Known Problems Paternal Grandfather      No Known Problems Maternal Aunt

## 2025-07-06 NOTE — ASSESSMENT & PLAN NOTE
EKG with sinus tachycardia and nonspecific ST-T wave changes.  Patient denies chest pain.  Troponins elevated.  Likely non-MI troponin elevation in the setting of sepsis  Follow-up on transthoracic echocardiogram  Started on low-dose beta-blocker because of elevated blood pressure

## 2025-07-06 NOTE — PLAN OF CARE
Problem: PAIN - ADULT  Goal: Verbalizes/displays adequate comfort level or baseline comfort level  Description: Interventions:  - Encourage patient to monitor pain and request assistance  - Assess pain using appropriate pain scale  - Administer analgesics as ordered based on type and severity of pain and evaluate response  - Implement non-pharmacological measures as appropriate and evaluate response  - Consider cultural and social influences on pain and pain management  - Notify physician/advanced practitioner if interventions unsuccessful or patient reports new pain  - Educate patient/family on pain management process including their role and importance of  reporting pain   - Provide non-pharmacologic/complimentary pain relief interventions  Outcome: Progressing     Problem: INFECTION - ADULT  Goal: Absence or prevention of progression during hospitalization  Description: INTERVENTIONS:  - Assess and monitor for signs and symptoms of infection  - Monitor lab/diagnostic results  - Monitor all insertion sites, i.e. indwelling lines, tubes, and drains  - Monitor endotracheal if appropriate and nasal secretions for changes in amount and color  - [place] appropriate cooling/warming therapies per order  - Administer medications as ordered  - Instruct and encourage patient and family to use good hand hygiene technique  - Identify and instruct in appropriate isolation precautions for identified infection/condition  Outcome: Progressing  Goal: Absence of fever/infection during neutropenic period  Description: INTERVENTIONS:  - Monitor WBC  - Perform strict hand hygiene  - Limit to healthy visitors only  - No plants, dried, fresh or silk flowers with mcgee in patient room  Outcome: Progressing     Problem: SAFETY ADULT  Goal: Patient will remain free of falls  Description: INTERVENTIONS:  - Educate patient/family on patient safety including physical limitations  - Instruct patient to call for assistance with activity   -  Consider consulting OT/PT to assist with strengthening/mobility based on AM PAC & JH-HLM score  - Consult OT/PT to assist with strengthening/mobility   - Keep Call bell within reach  - Keep bed low and locked with side rails adjusted as appropriate  - Keep care items and personal belongings within reach  - Initiate and maintain comfort rounds  - Make Fall Risk Sign visible to staff  - Offer Toileting every 2 Hours, in advance of need  - Initiate/Maintain bed/chair alarm  - Obtain necessary fall risk management equipment: bed/chair alarm   - Apply yellow socks and bracelet for high fall risk patients  - Consider moving patient to room near nurses station  Outcome: Progressing  Goal: Maintain or return to baseline ADL function  Description: INTERVENTIONS:  -  Assess patient's ability to carry out ADLs; assess patient's baseline for ADL function and identify physical deficits which impact ability to perform ADLs (bathing, care of mouth/teeth, toileting, grooming, dressing, etc.)  - Assess/evaluate cause of self-care deficits   - Assess range of motion  - Assess patient's mobility; develop plan if impaired  - Assess patient's need for assistive devices and provide as appropriate  - Encourage maximum independence but intervene and supervise when necessary  - Involve family in performance of ADLs  - Assess for home care needs following discharge   - Consider OT consult to assist with ADL evaluation and planning for discharge  - Provide patient education as appropriate  - Monitor functional capacity and physical performance, use of AM PAC & JH-HLM   - Monitor gait, balance and fatigue with ambulation    Outcome: Progressing  Goal: Maintains/Returns to pre admission functional level  Description: INTERVENTIONS:  - Perform AM-PAC 6 Click Basic Mobility/ Daily Activity assessment daily.  - Set and communicate daily mobility goal to care team and patient/family/caregiver.   - Collaborate with rehabilitation services on  mobility goals if consulted  - Perform Range of Motion 3 times a day.  - Reposition patient every 2 hours.  - Dangle patient 3 times a day  - Stand patient 3 times a day  - Ambulate patient 3 times a day  - Out of bed to chair 3 times a day   - Out of bed for meals 3 times a day  - Out of bed for toileting  - Record patient progress and toleration of activity level   Outcome: Progressing     Problem: DISCHARGE PLANNING  Goal: Discharge to home or other facility with appropriate resources  Description: INTERVENTIONS:  - Identify barriers to discharge w/patient and caregiver  - Arrange for needed discharge resources and transportation as appropriate  - Identify discharge learning needs (meds, wound care, etc.)  - Arrange for interpretive services to assist at discharge as needed  - Refer to Case Management Department for coordinating discharge planning if the patient needs post-hospital services based on physician/advanced practitioner order or complex needs related to functional status, cognitive ability, or social support system  Outcome: Progressing     Problem: Knowledge Deficit  Goal: Patient/family/caregiver demonstrates understanding of disease process, treatment plan, medications, and discharge instructions  Description: Complete learning assessment and assess knowledge base.  Interventions:  - Provide teaching at level of understanding  - Provide teaching via preferred learning methods  Outcome: Progressing

## 2025-07-06 NOTE — SEPSIS NOTE
Sepsis Note   Latrice Palacio 80 y.o. female MRN: [id number]  Unit/Bed#: [facility] 621-01 Encounter: [id number]       Initial Sepsis Screening       Row Name 07/06/25 1028                Is the patient's history suggestive of a new or worsening infection? Yes (Proceed)  -        Suspected source of infection urinary tract infection  -MH        Indicate SIRS criteria Hyperthemia > 38.3C (100.9F) OR Hypothermia <36C (96.8F);Tachycardia > 90 bpm  -MH        Are two or more of the above signs & symptoms of infection both present and new to the patient? Yes (Proceed)  -        Assess for evidence of organ dysfunction: Are any of the below criteria present within 6 hours of suspected infection and SIRS criteria that are NOT considered to be chronic conditions? --                  User Key  (r) = Recorded By, (t) = Taken By, (c) = Cosigned By      Initials Name Provider Type    LUIS F Antoine MD Physician                   Initial Sepsis Screening       Row Name 07/06/25 1028                   Initial Sepsis Assessment    Is the patient's history suggestive of a new or worsening infection? Yes (Proceed)  -        Suspected source of infection urinary tract infection  -MH        Indicate SIRS criteria Hyperthemia > 38.3C (100.9F) OR Hypothermia <36C (96.8F);Tachycardia > 90 bpm  -MH        Are two or more of the above signs & symptoms of infection both present and new to the patient? Yes (Proceed)  -                  User Key  (r) = Recorded By, (t) = Taken By, (c) = Cosigned By      Initials Name Provider Type    LUIS F Antoine MD Physician                         Body mass index is 23.12 kg/m².  Wt Readings from Last 1 Encounters:   07/06/25 61.1 kg (134 lb 11.2 oz)     IBW (Ideal Body Weight): 54.7 kg    Ideal body weight: 54.7 kg (120 lb 9.5 oz)  Adjusted ideal body weight: 57.3 kg (126 lb 3.8 oz)

## 2025-07-06 NOTE — PROGRESS NOTES
Progress Note - Hospitalist   Name: Latrice Palacio 80 y.o. female I MRN: [id number]  Unit/Bed#: [facility] 621-01 I Date of Admission: 7/5/2025   Date of Service: 7/6/2025 I Hospital Day: 1    Assessment & Plan  Sepsis (HCC)  Present admission as evidenced by tachycardia and leukocytosis.  In the setting of pyelonephritis.  Received fluids per sepsis protocol.  Started on IV Zosyn upon admission  Lactic acid negative.  Procalcitonin markedly elevated.  Will trend  Blood cultures admission both sets positive for gram-negative rods.  Await final culture results.  De-escalate antibiotics based on culture results.  Continue with IV fluids    Pyelonephritis  Cont zosyn started in ED (will cover GB as well if there is anything there on US)  F/U urine cx  Acid reflux disease  IV PPI  Probiotics while on antibiotics  Hyperlipidemia  CPK mildly elevated.  Trend with IV fluid  CKD (chronic kidney disease) stage 2, GFR 60-89 ml/min  HERMINIA on CKD  Likely prerenal with decreased PO intake and elevated BUN  IVF  Repeat labs and if not improving may need further work up    Lab Results   Component Value Date    EGFR 52 07/06/2025    EGFR 41 07/05/2025    EGFR 66 03/12/2025    CREATININE 1.02 07/06/2025    CREATININE 1.23 07/05/2025    CREATININE 0.89 03/12/2025     HERMINIA (acute kidney injury) (HCC)  HERMINIA on CKD  Likely prerenal with decreased PO intake and elevated BUN  IVF   BUN has improved.  Myalgia  Check ESR, CRP, CPK  Abnormal LFTs  Likely in the setting of sepsis.  CT without any gallbladder pathology.  Follow-up on right upper quadrant ultrasound.  LFTs improving  Elevated troponin  EKG with sinus tachycardia and nonspecific ST-T wave changes.  Patient denies chest pain.  Troponins elevated.  Likely non-MI troponin elevation in the setting of sepsis  Follow-up on transthoracic echocardiogram  Started on low-dose beta-blocker because of elevated blood pressure  Bacteremia due to Gram-negative bacteria  Both sets of admission blood  cultures positive for gram-negative maci.  Likely in the setting of urinary tract infection/pyelonephritis.  Continue with IV Zosyn.  De-escalate based on culture and sensitivity results.  Electrolyte imbalance  Hyponatremia hypokalemia on admission in the setting of nausea vomiting poor oral intake and sepsis.  Improving with IV fluid resuscitation.  Continue to follow BMP closely.  Replace potassium today.    VTE Pharmacologic Prophylaxis:   High Risk (Score >/= 5) - Pharmacological DVT Prophylaxis Ordered: enoxaparin (Lovenox). Sequential Compression Devices Ordered.    Mobility:   Basic Mobility Inpatient Raw Score: 21  JH-HLM Goal: 6: Walk 10 steps or more  JH-HLM Achieved: 5: Stand (1 or more minutes)  JH-HLM Goal achieved. Continue to encourage appropriate mobility.    Patient Centered Rounds: I performed bedside rounds with nursing staff today.   Discussions with Specialists or Other Care Team Provider: Discussed with nursing    Education and Discussions with Family / Patient: Attempted to update  (daughter) via phone. Unable to contact.    Current Length of Stay: 1 day(s)  Current Patient Status: Inpatient   Certification Statement: The patient will continue to require additional inpatient hospital stay due to ongoing management of bacteremia and pyelonephritis  Discharge Plan: Anticipate discharge in 48 hrs to home with home services.    Code Status: Level 3 - DNAR and DNI    Subjective   Seen examined at bedside.  Tmax 101.6.  Currently complains of abdominal pain.  Denies any dysuria.  Nauseous but tolerating clear liquids    Objective :  Temp:  [99.5 °F (37.5 °C)-101.6 °F (38.7 °C)] 99.5 °F (37.5 °C)  HR:  [101-122] 101  BP: (123-191)/(58-88) 144/77  Resp:  [12-20] 16  SpO2:  [93 %-100 %] 93 %  O2 Device: None (Room air)    Body mass index is 23.12 kg/m².     Input and Output Summary (last 24 hours):   No intake or output data in the 24 hours ending 07/06/25 1038    Physical  Exam  Constitutional:       Appearance: She is ill-appearing and diaphoretic.   HENT:      Head: Normocephalic.      Nose: Nose normal.      Mouth/Throat:      Mouth: Mucous membranes are dry.     Eyes:      Extraocular Movements: Extraocular movements intact.      Pupils: Pupils are equal, round, and reactive to light.       Cardiovascular:      Rate and Rhythm: Regular rhythm. Tachycardia present.   Pulmonary:      Effort: Pulmonary effort is normal.      Breath sounds: Normal breath sounds.   Abdominal:      General: There is no distension.      Palpations: Abdomen is soft.      Tenderness: There is abdominal tenderness. There is right CVA tenderness. There is no guarding.     Musculoskeletal:      Right lower leg: No edema.      Left lower leg: No edema.     Skin:     General: Skin is warm.     Neurological:      General: No focal deficit present.      Mental Status: She is alert and oriented to person, place, and time. Mental status is at baseline.     Psychiatric:         Mood and Affect: Mood normal.           Lines/Drains:        Telemetry:  Telemetry Orders (From admission, onward)               24 Hour Telemetry Monitoring  Continuous x 24 Hours (Telem)        Expiring   Question:  Reason for 24 Hour Telemetry  Answer:  PCI/EP study (including pacer and ICD implementation), Cardiac surgery, MI, abnormal cardiac cath, and chest pain- rule out MI                     Telemetry Reviewed: Normal Sinus Rhythm  Indication for Continued Telemetry Use: Acute MI/Unstable Angina/Rule out ACS               Lab Results: I have reviewed the following results:   Results from last 7 days   Lab Units 07/06/25  0520 07/05/25  1632   WBC Thousand/uL 20.70* 17.68*   HEMOGLOBIN g/dL 11.6 13.0   HEMATOCRIT % 36.2 40.0   PLATELETS Thousands/uL 202 218   BANDS PCT %  --  11*   LYMPHO PCT %  --  2*   MONO PCT %  --  2*   EOS PCT %  --  0     Results from last 7 days   Lab Units 07/06/25  0520   SODIUM mmol/L 134*   POTASSIUM  mmol/L 3.3*   CHLORIDE mmol/L 98   CO2 mmol/L 23   BUN mg/dL 21   CREATININE mg/dL 1.02   ANION GAP mmol/L 13   CALCIUM mg/dL 8.0*   ALBUMIN g/dL 3.3*   TOTAL BILIRUBIN mg/dL 0.41   ALK PHOS U/L 189*   ALT U/L 51   AST U/L 52*   GLUCOSE RANDOM mg/dL 120     Results from last 7 days   Lab Units 07/05/25  1632   INR  1.02             Results from last 7 days   Lab Units 07/05/25  1823 07/05/25  1632   LACTIC ACID mmol/L 0.9  --    PROCALCITONIN ng/ml  --  246.26*       Recent Cultures (last 7 days):   Results from last 7 days   Lab Units 07/05/25  1705 07/05/25  1632   GRAM STAIN RESULT  Gram negative rods* Gram negative rods*             Last 24 Hours Medication List:     Current Facility-Administered Medications:     acetaminophen (TYLENOL) tablet 650 mg, Q6H PRN    albuterol (PROVENTIL HFA,VENTOLIN HFA) inhaler 2 puff, Q6H PRN    ascorbic acid (VITAMIN C) tablet 1,000 mg, Daily    aspirin (ECOTRIN LOW STRENGTH) EC tablet 81 mg, Daily    atorvastatin (LIPITOR) tablet 40 mg, Daily    enoxaparin (LOVENOX) subcutaneous injection 40 mg, Daily    fluticasone (ARNUITY ELLIPTA) 200 MCG/ACT inhaler 1 puff, Daily    magnesium gluconate (MAGONATE) tablet 500 mg, HS    metoprolol tartrate (LOPRESSOR) tablet 25 mg, Q12H CORONA    NIFEdipine (PROCARDIA XL) 24 hr tablet 30 mg, Daily    ondansetron (ZOFRAN) injection 4 mg, Q6H PRN    pantoprazole (PROTONIX) injection 40 mg, Q24H CORONA    [COMPLETED] piperacillin-tazobactam (ZOSYN) 4.5 g in sodium chloride 0.9 % 100 mL IV LOADING DOSE, Once, Last Rate: Stopped (07/06/25 0544) **FOLLOWED BY** piperacillin-tazobactam (ZOSYN) 4.5 g in sodium chloride 0.9 % 100 mL IVPB (EXTENDED INFUSION), Q8H    potassium chloride (Klor-Con M20) CR tablet 40 mEq, BID    saccharomyces boulardii (FLORASTOR) capsule 250 mg, BID    sodium chloride 0.9 % infusion, Continuous, Last Rate: 125 mL/hr (07/06/25 7520)    Administrative Statements   Today, Patient Was Seen By: Sydney Antoine MD      **Please Note:  This note may have been constructed using a voice recognition system.**

## 2025-07-06 NOTE — ASSESSMENT & PLAN NOTE
HERMINIA on CKD  Likely prerenal with decreased PO intake and elevated BUN  IVF   BUN has improved.

## 2025-07-06 NOTE — ASSESSMENT & PLAN NOTE
Hyponatremia hypokalemia on admission in the setting of nausea vomiting poor oral intake and sepsis.  Improving with IV fluid resuscitation.  Continue to follow BMP closely.  Replace potassium today.

## 2025-07-06 NOTE — CASE MANAGEMENT
Case Management Assessment & Discharge Planning Note    Patient name Latrice Palacio  Location [facility] 621/[facility] 621-01 MRN [id number]  : 1945 Date 2025       Current Admission Date: 2025  Current Admission Diagnosis:Sepsis (HCC)   Patient Active Problem List    Diagnosis Date Noted    HERMINIA (acute kidney injury) (HCC) 2025    Pyelonephritis 2025    Myalgia 2025    Abnormal LFTs 2025    Elevated troponin 2025    Sepsis (HCC) 2025    Bacteremia due to Gram-negative bacteria 2025    Electrolyte imbalance 2025    Arthritis 2025    CKD (chronic kidney disease) stage 2, GFR 60-89 ml/min 10/31/2024    Osteoporosis without current pathological fracture 2023    Mild persistent asthma without complication 2023    Diarrhea 2023    Primary hypertension 06/15/2022    Annual physical exam 2021    Vitamin D insufficiency 2020    Chronic pain of both knees 2019    Chronic midline low back pain without sciatica 2019    Prediabetes 2015    Acid reflux disease 2012    Hyperlipidemia 2012    Osteoarthrosis, hand 2012      LOS (days): 1  Geometric Mean LOS (GMLOS) (days):   Days to GMLOS:     OBJECTIVE:    Risk of Unplanned Readmission Score: 14.56         Current admission status: Inpatient       Preferred Pharmacy:   [id number] - [place], [place] - [address] [address]  Phone: [phone] Fax: [phone]    Primary Care Provider: Subhash Leonard MD    Primary Insurance: [employer]  Secondary Insurance:     ASSESSMENT:  Active Health Care Proxies    There are no active Health Care Proxies on file.       Patient Information  Admitted from:: Home  Mental Status: Alert  During Assessment patient was accompanied by: Not accompanied during assessment  Assessment information provided by:: Patient  Primary Caregiver: Self  Support Systems: Self,  Family members  What city do you live in?: [place]  Home entry access options. Select all that apply.: Stairs  Number of steps to enter home.: One Flight  Type of Current Residence: Apartment  Floor Level: 2  Upon entering residence, is there a bedroom on the main floor (no further steps)?: Yes  Upon entering residence, is there a bathroom on the main floor (no further steps)?: Yes  Living Arrangements: Lives Alone  Is patient a ?: No    Activities of Daily Living Prior to Admission  Functional Status: Independent  Completes ADLs independently?: Yes  Ambulates independently?: Yes  Does patient use assisted devices?: No  Does patient currently own DME?: No  Does patient have a history of Outpatient Therapy (PT/OT)?: Yes  Does the patient have a history of Short-Term Rehab?: No  Does patient have a history of HHC?: No  Does patient currently have HHC?: No    Patient Information Continued  Income Source: SSI/SSD  Does patient have prescription coverage?: Yes  Can the patient afford their medications and any related supplies (such as glucometers or test strips)?: Yes  Does patient receive dialysis treatments?: No  Does patient have a history of substance abuse?: No  Does patient have a history of Mental Health Diagnosis?: No      DISCHARGE DETAILS:    Discharge planning discussed with:: Patient  Freedom of Choice: Yes  Comments - Freedom of Choice: Discussed FOC  CM contacted family/caregiver?: No- see comments (declined)      Other Referral/Resources/Interventions Provided:  Referral Comments: Using [employer] [facility], [id number], this CM introduced self and role to patient at bedside.  Lives alone in 2nd floor apartment, FFOS to enter.  Independent with ADLS, no DME at home, history of OP therapy, no HH or STR noted.

## 2025-07-06 NOTE — PLAN OF CARE
Problem: PAIN - ADULT  Goal: Verbalizes/displays adequate comfort level or baseline comfort level  Description: Interventions:  - Encourage patient to monitor pain and request assistance  - Assess pain using appropriate pain scale  - Administer analgesics as ordered based on type and severity of pain and evaluate response  - Implement non-pharmacological measures as appropriate and evaluate response  - Consider cultural and social influences on pain and pain management  - Notify physician/advanced practitioner if interventions unsuccessful or patient reports new pain  - Educate patient/family on pain management process including their role and importance of  reporting pain   - Provide non-pharmacologic/complimentary pain relief interventions  Outcome: Progressing     Problem: INFECTION - ADULT  Goal: Absence or prevention of progression during hospitalization  Description: INTERVENTIONS:  - Assess and monitor for signs and symptoms of infection  - Monitor lab/diagnostic results  - Monitor all insertion sites, i.e. indwelling lines, tubes, and drains  - Monitor endotracheal if appropriate and nasal secretions for changes in amount and color  - Sterling appropriate cooling/warming therapies per order  - Administer medications as ordered  - Instruct and encourage patient and family to use good hand hygiene technique  - Identify and instruct in appropriate isolation precautions for identified infection/condition  Outcome: Progressing  Goal: Absence of fever/infection during neutropenic period  Description: INTERVENTIONS:  - Monitor WBC  - Perform strict hand hygiene  - Limit to healthy visitors only  - No plants, dried, fresh or silk flowers with mcgee in patient room  Outcome: Progressing     Problem: DISCHARGE PLANNING  Goal: Discharge to home or other facility with appropriate resources  Description: INTERVENTIONS:  - Identify barriers to discharge w/patient and caregiver  - Arrange for needed discharge resources  and transportation as appropriate  - Identify discharge learning needs (meds, wound care, etc.)  - Arrange for interpretive services to assist at discharge as needed  - Refer to Case Management Department for coordinating discharge planning if the patient needs post-hospital services based on physician/advanced practitioner order or complex needs related to functional status, cognitive ability, or social support system  Outcome: Progressing

## 2025-07-06 NOTE — ASSESSMENT & PLAN NOTE
Both sets of admission blood cultures positive for gram-negative maci.  Likely in the setting of urinary tract infection/pyelonephritis.  Continue with IV Zosyn.  De-escalate based on culture and sensitivity results.

## 2025-07-06 NOTE — ASSESSMENT & PLAN NOTE
HERMINIA on CKD  Likely prerenal with decreased PO intake and elevated BUN  IVF  Repeat labs and if not improving may need further work up    Lab Results   Component Value Date    EGFR 41 07/05/2025    EGFR 66 03/12/2025    EGFR 70 10/26/2024    CREATININE 1.23 07/05/2025    CREATININE 0.89 03/12/2025    CREATININE 0.80 10/26/2024

## 2025-07-07 ENCOUNTER — APPOINTMENT (INPATIENT)
Dept: NON INVASIVE DIAGNOSTICS | Facility: HOSPITAL | Age: 80
DRG: 872 | End: 2025-07-07
Payer: COMMERCIAL

## 2025-07-07 LAB
ALBUMIN SERPL BCG-MCNC: 2.9 G/DL (ref 3.5–5)
ALP SERPL-CCNC: 187 U/L (ref 34–104)
ALT SERPL W P-5'-P-CCNC: 40 U/L (ref 7–52)
ANION GAP SERPL CALCULATED.3IONS-SCNC: 7 MMOL/L (ref 4–13)
AORTIC ROOT: 2.8 CM
ASCENDING AORTA: 2.9 CM
AST SERPL W P-5'-P-CCNC: 42 U/L (ref 13–39)
ATRIAL RATE: 102 BPM
BACTERIA UR CULT: ABNORMAL
BASOPHILS # BLD AUTO: 0.03 THOUSANDS/ÂΜL (ref 0–0.1)
BASOPHILS NFR BLD AUTO: 0 % (ref 0–1)
BILIRUB SERPL-MCNC: 0.33 MG/DL (ref 0.2–1)
BSA FOR ECHO PROCEDURE: 1.65 M2
BUN SERPL-MCNC: 17 MG/DL (ref 5–25)
CALCIUM ALBUM COR SERPL-MCNC: 8.4 MG/DL (ref 8.3–10.1)
CALCIUM SERPL-MCNC: 7.5 MG/DL (ref 8.4–10.2)
CHLORIDE SERPL-SCNC: 106 MMOL/L (ref 96–108)
CO2 SERPL-SCNC: 20 MMOL/L (ref 21–32)
CREAT SERPL-MCNC: 1.03 MG/DL (ref 0.6–1.3)
E WAVE DECELERATION TIME: 158 MS
E/A RATIO: 0.72
EOSINOPHIL # BLD AUTO: 0 THOUSAND/ÂΜL (ref 0–0.61)
EOSINOPHIL NFR BLD AUTO: 0 % (ref 0–6)
ERYTHROCYTE [DISTWIDTH] IN BLOOD BY AUTOMATED COUNT: 13.8 % (ref 11.6–15.1)
FRACTIONAL SHORTENING: 27 (ref 28–44)
GFR SERPL CREATININE-BSD FRML MDRD: 51 ML/MIN/1.73SQ M
GLUCOSE SERPL-MCNC: 150 MG/DL (ref 65–140)
HCT VFR BLD AUTO: 32.2 % (ref 34.8–46.1)
HGB BLD-MCNC: 10.3 G/DL (ref 11.5–15.4)
IMM GRANULOCYTES # BLD AUTO: 0.1 THOUSAND/UL (ref 0–0.2)
IMM GRANULOCYTES NFR BLD AUTO: 1 % (ref 0–2)
INTERVENTRICULAR SEPTUM IN DIASTOLE (PARASTERNAL SHORT AXIS VIEW): 1.2 CM
INTERVENTRICULAR SEPTUM: 1.2 CM (ref 0.6–1.1)
LAAS-AP2: 15.5 CM2
LAAS-AP4: 13.3 CM2
LEFT ATRIUM SIZE: 2.8 CM
LEFT ATRIUM VOLUME (MOD BIPLANE): 34 ML
LEFT ATRIUM VOLUME INDEX (MOD BIPLANE): 20.6 ML/M2
LEFT INTERNAL DIMENSION IN SYSTOLE: 3.2 CM (ref 2.1–4)
LEFT VENTRICULAR INTERNAL DIMENSION IN DIASTOLE: 4.4 CM (ref 3.5–6)
LEFT VENTRICULAR POSTERIOR WALL IN END DIASTOLE: 1.2 CM
LEFT VENTRICULAR STROKE VOLUME: 47 ML
LV EF US.2D.A4C+ESTIMATED: 57 %
LVSV (TEICH): 47 ML
LYMPHOCYTES # BLD AUTO: 1.02 THOUSANDS/ÂΜL (ref 0.6–4.47)
LYMPHOCYTES NFR BLD AUTO: 6 % (ref 14–44)
MCH RBC QN AUTO: 29.6 PG (ref 26.8–34.3)
MCHC RBC AUTO-ENTMCNC: 32 G/DL (ref 31.4–37.4)
MCV RBC AUTO: 93 FL (ref 82–98)
MONOCYTES # BLD AUTO: 0.98 THOUSAND/ÂΜL (ref 0.17–1.22)
MONOCYTES NFR BLD AUTO: 6 % (ref 4–12)
MV E'TISSUE VEL-LAT: 7 CM/S
MV E'TISSUE VEL-SEP: 6 CM/S
MV PEAK A VEL: 0.87 M/S
MV PEAK E VEL: 63 CM/S
MV STENOSIS PRESSURE HALF TIME: 46 MS
MV VALVE AREA P 1/2 METHOD: 4.78
NEUTROPHILS # BLD AUTO: 15.19 THOUSANDS/ÂΜL (ref 1.85–7.62)
NEUTS SEG NFR BLD AUTO: 87 % (ref 43–75)
NRBC BLD AUTO-RTO: 0 /100 WBCS
P AXIS: 71 DEGREES
PLATELET # BLD AUTO: 195 THOUSANDS/UL (ref 149–390)
PMV BLD AUTO: 10.9 FL (ref 8.9–12.7)
POTASSIUM SERPL-SCNC: 3.8 MMOL/L (ref 3.5–5.3)
PR INTERVAL: 212 MS
PROCALCITONIN SERPL-MCNC: 82.85 NG/ML
PROT SERPL-MCNC: 6.3 G/DL (ref 6.4–8.4)
QRS AXIS: -10 DEGREES
QRSD INTERVAL: 96 MS
QT INTERVAL: 388 MS
QTC INTERVAL: 506 MS
RA PRESSURE ESTIMATED: 3 MMHG
RBC # BLD AUTO: 3.48 MILLION/UL (ref 3.81–5.12)
RIGHT ATRIUM AREA SYSTOLE A4C: 13.3 CM2
RIGHT VENTRICLE ID DIMENSION: 3.4 CM
RV PSP: 21 MMHG
SL CV LEFT ATRIUM LENGTH A2C: 4.7 CM
SL CV LV EF: 57
SL CV PED ECHO LEFT VENTRICLE DIASTOLIC VOLUME (MOD BIPLANE) 2D: 88 ML
SL CV PED ECHO LEFT VENTRICLE SYSTOLIC VOLUME (MOD BIPLANE) 2D: 41 ML
SODIUM SERPL-SCNC: 133 MMOL/L (ref 135–147)
T WAVE AXIS: 24 DEGREES
TR MAX PG: 18 MMHG
TR PEAK VELOCITY: 2.1 M/S
TRICUSPID ANNULAR PLANE SYSTOLIC EXCURSION: 1.8 CM
TRICUSPID VALVE PEAK REGURGITATION VELOCITY: 2.1 M/S
VENTRICULAR RATE: 102 BPM
WBC # BLD AUTO: 17.32 THOUSAND/UL (ref 4.31–10.16)

## 2025-07-07 PROCEDURE — 85025 COMPLETE CBC W/AUTO DIFF WBC: CPT | Performed by: INTERNAL MEDICINE

## 2025-07-07 PROCEDURE — 84145 PROCALCITONIN (PCT): CPT | Performed by: INTERNAL MEDICINE

## 2025-07-07 PROCEDURE — 93306 TTE W/DOPPLER COMPLETE: CPT

## 2025-07-07 PROCEDURE — 99232 SBSQ HOSP IP/OBS MODERATE 35: CPT | Performed by: INTERNAL MEDICINE

## 2025-07-07 PROCEDURE — 80053 COMPREHEN METABOLIC PANEL: CPT | Performed by: INTERNAL MEDICINE

## 2025-07-07 PROCEDURE — 93010 ELECTROCARDIOGRAM REPORT: CPT | Performed by: INTERNAL MEDICINE

## 2025-07-07 PROCEDURE — 93306 TTE W/DOPPLER COMPLETE: CPT | Performed by: INTERNAL MEDICINE

## 2025-07-07 RX ORDER — SODIUM CHLORIDE 9 MG/ML
50 INJECTION, SOLUTION INTRAVENOUS CONTINUOUS
Status: DISPENSED | OUTPATIENT
Start: 2025-07-07 | End: 2025-07-07

## 2025-07-07 RX ORDER — PANTOPRAZOLE SODIUM 40 MG/1
40 TABLET, DELAYED RELEASE ORAL
Status: DISCONTINUED | OUTPATIENT
Start: 2025-07-08 | End: 2025-07-09 | Stop reason: HOSPADM

## 2025-07-07 RX ADMIN — ASPIRIN 81 MG: 81 TABLET ORAL at 08:59

## 2025-07-07 RX ADMIN — DEXTROSE MONOHYDRATE 2000 MG: 50 INJECTION, SOLUTION INTRAVENOUS at 10:28

## 2025-07-07 RX ADMIN — PIPERACILLIN AND TAZOBACTAM 4.5 G: 36; 4.5 INJECTION, POWDER, FOR SOLUTION INTRAVENOUS at 04:59

## 2025-07-07 RX ADMIN — ATORVASTATIN CALCIUM 40 MG: 40 TABLET, FILM COATED ORAL at 08:59

## 2025-07-07 RX ADMIN — ENOXAPARIN SODIUM 40 MG: 40 INJECTION SUBCUTANEOUS at 08:59

## 2025-07-07 RX ADMIN — Medication 250 MG: at 17:32

## 2025-07-07 RX ADMIN — NIFEDIPINE 30 MG: 30 TABLET, FILM COATED, EXTENDED RELEASE ORAL at 08:59

## 2025-07-07 RX ADMIN — Medication 250 MG: at 08:59

## 2025-07-07 RX ADMIN — OXYCODONE HYDROCHLORIDE AND ACETAMINOPHEN 1000 MG: 500 TABLET ORAL at 08:59

## 2025-07-07 RX ADMIN — PANTOPRAZOLE SODIUM 40 MG: 40 INJECTION, POWDER, FOR SOLUTION INTRAVENOUS at 08:59

## 2025-07-07 RX ADMIN — Medication 500 MG: at 21:19

## 2025-07-07 RX ADMIN — Medication 12.5 MG: at 21:19

## 2025-07-07 RX ADMIN — PERFLUTREN 0.6 ML/MIN: 6.52 INJECTION, SUSPENSION INTRAVENOUS at 12:53

## 2025-07-07 RX ADMIN — SODIUM CHLORIDE 125 ML/HR: 0.9 INJECTION, SOLUTION INTRAVENOUS at 09:04

## 2025-07-07 RX ADMIN — METOPROLOL TARTRATE 25 MG: 25 TABLET, FILM COATED ORAL at 08:59

## 2025-07-07 RX ADMIN — FLUTICASONE FUROATE 1 PUFF: 200 POWDER RESPIRATORY (INHALATION) at 09:00

## 2025-07-07 NOTE — ASSESSMENT & PLAN NOTE
Cont zosyn started in ED (will cover GB as well if there is anything there on US)  F/U urine cx.  Urine culture with E. coli.  Antibiotic de-escalated to ceftriaxone today.

## 2025-07-07 NOTE — UTILIZATION REVIEW
NOTIFICATION OF INPATIENT ADMISSION   AUTHORIZATION REQUEST   SERVICING FACILITY:   [facility]  Address: [address] [place] [address]  Tax ID: [id number]  NPI: [id number] ATTENDING PROVIDER:  Attending Name and NPI#: Sydney Antoine Md [[id number]]  Address: [address] [place] [address]  Phone: [phone]   ADMISSION INFORMATION:  Place of Service: Inpatient [facility]  Place of Service Code: 21  Inpatient Admission Date/Time: 7/5/25 11:47 PM  Discharge Date/Time: No discharge date for patient encounter.  Admitting Diagnosis Code/Description:  Pyelonephritis [N12]  Sepsis (HCC) [A41.9]     UTILIZATION REVIEW CONTACT:  Jalil Estrella Utilization   Network Utilization Review Department  Phone: [phone]  Fax: [phone]  Email: [email or web address]  Contact for approvals/pending authorizations, clinical reviews, and discharge.     PHYSICIAN ADVISORY SERVICES:  Medical Necessity Denial & Oecj-kv-Lnxv Review  Phone: [phone]  Fax: [phone]  Email: [email or web address]     DISCHARGE SUPPORT TEAM:  For Patients Discharge Needs & Updates  Phone: [phone] opt. 2 Fax: [phone]  Email: [email or web address]

## 2025-07-07 NOTE — ASSESSMENT & PLAN NOTE
HERMINIA on CKD  Likely prerenal with decreased PO intake and elevated BUN  IVF  Repeat labs and if not improving may need further work up    Lab Results   Component Value Date    EGFR 51 07/07/2025    EGFR 52 07/06/2025    EGFR 41 07/05/2025    CREATININE 1.03 07/07/2025    CREATININE 1.02 07/06/2025    CREATININE 1.23 07/05/2025

## 2025-07-07 NOTE — UTILIZATION REVIEW
Initial Clinical Review    Admission: Date/Time/Statement:   Admission Orders (From admission, onward)       Ordered        07/05/25 2347  INPATIENT ADMISSION  Once                          Orders Placed This Encounter   Procedures    INPATIENT ADMISSION     Standing Status:   Standing     Number of Occurrences:   1     Level of Care:   Med Surg [16]     Estimated length of stay:   More than 2 Midnights     Certification:   I certify that inpatient services are medically necessary for this patient for a duration of greater than two midnights. See H&P and MD Progress Notes for additional information about the patient's course of treatment.     ED Arrival Information       Expected   -    Arrival   7/5/2025 14:55    Acuity   Emergent              Means of arrival   Wheelchair    Escorted by   Family Member    Service   Hospitalist    Admission type   Emergency              Arrival complaint   nausea, vomiting             Chief Complaint   Patient presents with    Fever     Fever, vomiting, diarrhea since Thursday.        Initial Presentation: 80 y.o. female with PMHx including HTN, prediabetes, hyperlipidemia, depression who presented on 7/5/25 to ED due to suprapubic abdominal pain, nausea, vomiting, myalgia and headaches.  In the ED, temp 101.6, tachycardic at 120, hypertensive at 180/74.  Labs revealed WBC 17.68, Na 129, anion gap 16, BUN 32, Cr 1.23, AST 77. ALT 73, alk phos 195, , trop 808. UA positive for ketones, blood, protein, nitrite, leuks, wbc, rbc. Given 2 L IVF, Tylenol and Zofran and started on IV Zosyn in ED.     Plan:  Admit Inpatient status Dx Pyelonephritis, HERMINIA :   med surg, telemetry, clear liquid diet, continue IV ABX and IVF, order renal US, fu on urine cx, start probiotics, continue home meds, order CPK, repeat labs.     Anticipated Length of Stay/Certification Statement: Patient will be admitted on an inpatient basis with an anticipated length of stay of greater than 2 midnights  secondary to pyelonephritis, HERMINIA.     Date: 7/6   Day 2: Dx: Sepsis. Continues with intermittent febrile at 100.9 overnight, complains of abdominal pain and nausea. Continue clear liquid diet. Blood cultures admission both sets positive for gram-negative rods. Await final culture results. Continue telemetry, IVF, IV ABX, monitor VS, fu on cxs. Order ESR, CRP, CPK, monitor LFTs. Monitor electrolytes and replete prn. CM following for dispo planning.     ED Treatment-Medication Administration from 07/05/2025 1455 to 07/06/2025 0254         Date/Time Order Dose Route Action     07/05/2025 1828 multi-electrolyte (Plasmalyte-A/Isolyte-S PH 7.4/Normosol-R) IV bolus 1,000 mL 1,000 mL Intravenous New Bag     07/05/2025 1911 multi-electrolyte (Plasmalyte-A/Isolyte-S PH 7.4/Normosol-R) IV bolus 1,000 mL -- Intravenous Restarted     07/05/2025 1910 piperacillin-tazobactam (ZOSYN) 4.5 g in sodium chloride 0.9 % 100 mL IVPB 4.5 g Intravenous New Bag     07/05/2025 2036 multi-electrolyte (Plasmalyte-A/Isolyte-S PH 7.4/Normosol-R) IV bolus 1,000 mL 1,000 mL Intravenous New Bag     07/05/2025 1902 iohexol (OMNIPAQUE) 350 MG/ML injection (MULTI-DOSE) 85 mL 85 mL Intravenous Given     07/05/2025 2150 ondansetron (ZOFRAN-ODT) dispersible tablet 4 mg 4 mg Oral Given     07/05/2025 2233 acetaminophen (TYLENOL) tablet 650 mg 650 mg Oral Given            Scheduled Medications:  vitamin C, 1,000 mg, Oral, Daily  aspirin, 81 mg, Oral, Daily  atorvastatin, 40 mg, Oral, Daily  cefTRIAXone, 2,000 mg, Intravenous, Q24H  enoxaparin, 40 mg, Subcutaneous, Daily  fluticasone, 1 puff, Inhalation, Daily  magnesium gluconate, 500 mg, Oral, HS  metoprolol tartrate, 25 mg, Oral, Q12H CORONA  NIFEdipine ER, 30 mg, Oral, Daily  pantoprazole, 40 mg, Intravenous, Q24H CORONA  saccharomyces boulardii, 250 mg, Oral, BID      Continuous IV Infusions:  sodium chloride, 125 mL/hr, Intravenous, Continuous      PRN Meds:  acetaminophen, 650 mg, Oral, Q6H PRN  x1  albuterol, 2 puff, Inhalation, Q6H PRN  ondansetron, 4 mg, Intravenous, Q6H PRN x1      ED Triage Vitals   Temperature Pulse Respirations Blood Pressure SpO2 Pain Score   07/05/25 1458 07/05/25 1458 07/05/25 1458 07/05/25 1458 07/05/25 1458 07/05/25 1702   (!) 101.6 °F (38.7 °C) (!) 120 20 (!) 180/74 98 % No Pain     Weight (last 2 days)       Date/Time Weight    07/06/25 0302 61.1 (134.7)            Vital Signs (last 3 days)       Date/Time Temp Pulse Resp BP MAP (mmHg) SpO2 O2 Device Patient Position - Orthostatic VS Pain    07/07/25 06:29:38 98.8 °F (37.1 °C) 88 -- 120/66 84 95 % -- -- --    07/07/25 0300 99.6 °F (37.6 °C) 87 -- 111/56 -- 94 % None (Room air) Lying --    07/06/25 22:35:09 98.9 °F (37.2 °C) 74 17 99/56 70 93 % -- -- --    07/06/25 2110 98.4 °F (36.9 °C) -- -- -- -- -- -- -- --    07/06/25 2000 -- -- -- -- -- -- None (Room air) -- 3    07/06/25 1933 -- -- -- -- -- -- -- -- Med Not Given for Pain - for MAR use only    07/06/25 19:17:27 100.9 °F (38.3 °C) 97 18 134/75 95 95 % -- -- --    07/06/25 1600 98 °F (36.7 °C) -- -- -- -- -- -- -- --    07/06/25 14:18:39 100.3 °F (37.9 °C) 95 -- 136/76 96 95 % -- -- --    07/06/25 1116 99.4 °F (37.4 °C) -- -- -- -- -- -- -- --    07/06/25 10:33:47 -- 101 -- 144/77 99 93 % -- -- --    07/06/25 1033 -- 101 -- 144/77 -- -- -- -- --    07/06/25 0924 -- -- -- -- -- -- -- -- 7    07/06/25 08:43:23 -- 102 -- 150/79 103 96 % -- -- --    07/06/25 0742 -- -- -- -- -- -- None (Room air) -- --    07/06/25 07:25:18 -- 104 -- 150/79 103 95 % -- -- --    07/06/25 0302 99.5 °F (37.5 °C) 103 16 150/81 104 95 % -- -- 8    07/06/25 03:01:34 99.5 °F (37.5 °C) -- -- 150/81 104 -- -- -- --    07/05/25 2300 -- 112 18 126/58 83 96 % None (Room air) -- --    07/05/25 1930 -- 108 18 123/59 85 97 % None (Room air) -- --    07/05/25 1830 -- 112 12 124/60 86 97 % None (Room air) Sitting No Pain    07/05/25 1702 -- 120 20 136/62 89 100 % None (Room air) Sitting No Pain    07/05/25 1600  -- 122 14 191/88 127 98 % None (Room air) Sitting --    07/05/25 1458 101.6 °F (38.7 °C) 120 20 180/74 -- 98 % None (Room air) -- --              Pertinent Labs/Diagnostic Test Results:   Radiology:  CT abdomen pelvis with contrast   ED Interpretation by Olivier Chao DO (07/05 1913)   Right perinephric stranding c/w pyelonephritis, no stone, no appy      Final Interpretation by Fredi Junior DO (07/05 2248)      The right kidney is edematous in appearance. There is right perinephric stranding. No obstructing calcification along the course of either ureter. Finding could be on the basis of recently passed stone. Pyelonephritis of infectious/inflammatory etiology    is in the differential. Correlate clinically and with urinalysis.            Workstation performed: [id number]         US right upper quadrant    (Results Pending)     Cardiology:  ECG 12 lead   Final Result by Timo Borjas MD (07/07 0738)   Sinus tachycardia with 1st degree A-V block   Prolonged QT   Abnormal ECG   When compared with ECG of 05-Jul-2025 15:04,   MA interval has increased      Confirmed by Timo Borjas ([id number]) on 7/7/2025 7:38:39 AM      ECG 12 lead   Final Result by Timo Borjas MD (07/06 0723)   Sinus tachycardia   Left axis deviation   Poor R Wave Progression   Abnormal ECG   When compared with ECG of 16-Feb-2014 08:13,   Vent. rate has increased by  45 bpm      Confirmed by Timo Borjas ([id number]) on 7/6/2025 7:23:10 AM        GI:  No orders to display           Results from last 7 days   Lab Units 07/07/25 0524 07/06/25  0520 07/05/25  1632   WBC Thousand/uL 17.32* 20.70* 17.68*   HEMOGLOBIN g/dL 10.3* 11.6 13.0   HEMATOCRIT % 32.2* 36.2 40.0   PLATELETS Thousands/uL 195 202 218   TOTAL NEUT ABS Thousands/µL 15.19*  --   --    BANDS PCT %  --   --  11*         Results from last 7 days   Lab Units 07/07/25  0524 07/06/25  0520 07/05/25  1632   SODIUM mmol/L 133* 134* 129*   POTASSIUM mmol/L 3.8 3.3* 3.9   CHLORIDE  mmol/L 106 98 93*   CO2 mmol/L 20* 23 20*   ANION GAP mmol/L 7 13 16*   BUN mg/dL 17 21 32*   CREATININE mg/dL 1.03 1.02 1.23   EGFR ml/min/1.73sq m 51 52 41   CALCIUM mg/dL 7.5* 8.0* 9.0   MAGNESIUM mg/dL  --   --  2.2   PHOSPHORUS mg/dL  --   --  2.4     Results from last 7 days   Lab Units 07/07/25  0524 07/06/25  0520 07/05/25  1632   AST U/L 42* 52* 77*   ALT U/L 40 51 73*   ALK PHOS U/L 187* 189* 195*   TOTAL PROTEIN g/dL 6.3* 6.9 8.0   ALBUMIN g/dL 2.9* 3.3* 3.9   TOTAL BILIRUBIN mg/dL 0.33 0.41 0.56         Results from last 7 days   Lab Units 07/07/25  0524 07/06/25  0520 07/05/25  1632   GLUCOSE RANDOM mg/dL 150* 120 148*     Results from last 7 days   Lab Units 07/05/25  1632   CK TOTAL U/L 636*     Results from last 7 days   Lab Units 07/06/25  0943 07/06/25  0818 07/06/25  0520   HS TNI 0HR ng/L  --   --  808*   HS TNI 2HR ng/L  --  771*  --    HSTNI D2 ng/L  --  -37  --    HS TNI 4HR ng/L 638*  --   --    HSTNI D4 ng/L -170  --   --          Results from last 7 days   Lab Units 07/05/25  1632   PROTIME seconds 13.7   INR  1.02   PTT seconds 27         Results from last 7 days   Lab Units 07/07/25  0524 07/05/25  1632   PROCALCITONIN ng/ml 82.85* 246.26*     Results from last 7 days   Lab Units 07/05/25  1823   LACTIC ACID mmol/L 0.9     Results from last 7 days   Lab Units 07/05/25  1632   LIPASE u/L 32     Results from last 7 days   Lab Units 07/06/25  0520   CRP mg/L 409.3*   SED RATE mm/hour >130*             Results from last 7 days   Lab Units 07/05/25  1707   CLARITY UA  Turbid   COLOR UA  Light Yellow   SPEC GRAV UA  1.019   PH UA  6.0   GLUCOSE UA mg/dl Negative   KETONES UA mg/dl 60 (2+)*   BLOOD UA  Moderate*   PROTEIN UA mg/dl 200 (2+)*   NITRITE UA  Positive*   BILIRUBIN UA  Negative   UROBILINOGEN UA (BE) mg/dl <2.0   LEUKOCYTES UA  Moderate*   WBC UA /hpf Innumerable*   RBC UA /hpf 20-30*   BACTERIA UA /hpf Occasional   EPITHELIAL CELLS WET PREP /hpf Occasional   MUCUS THREADS   Occasional*     Results from last 7 days   Lab Units 07/05/25  1707 07/05/25  1705 07/05/25  1632   GRAM STAIN RESULT   --  Gram negative rods* Gram negative rods*   URINE CULTURE  >100,000 cfu/ml Gram Negative Aureliano*  --   --      Past Medical History[1]  Present on Admission:   Acid reflux disease   Hyperlipidemia   CKD (chronic kidney disease) stage 2, GFR 60-89 ml/min      Admitting Diagnosis: Pyelonephritis [N12]  Sepsis (HCC) [A41.9]  Age/Sex: 80 y.o. female    Network Utilization Review Department  ATTENTION: Please call with any questions or concerns to [phone] and carefully listen to the prompts so that you are directed to the right person. All voicemails are confidential.   For Discharge needs, contact Care Management DC Support Team at [phone] opt. [phone]  Send all requests for admission clinical reviews, approved or denied determinations and any other requests to dedicated fax number below belonging to the campus where the patient is receiving treatment. List of dedicated fax numbers for the Facilities:  FACILITY NAME UR FAX NUMBER   ADMISSION DENIALS (Administrative/Medical Necessity) [phone]   DISCHARGE SUPPORT TEAM (NETWORK) [phone]   PARENT CHILD HEALTH (Maternity/NICU/Pediatrics) [phone]   [facility] [phone]   [facility] [phone]   [facility] [phone]   [facility] [phone]   [facility] [phone]   [facility] [phone]   [facility] [phone]   [facility] [phone]   [facility] [phone]   [facility] [phone]   [facility] [phone]   [facility] [place] [phone]              [1]   Past Medical  History:  Diagnosis Date    Depression     Plantar fasciitis 04/25/2013    Vitamin D deficiency

## 2025-07-07 NOTE — ASSESSMENT & PLAN NOTE
Both sets of admission blood cultures positive for gram-negative maci.  Likely in the setting of urinary tract infection/pyelonephritis.  Started initially on IV Zosyn.  De-escalated to ceftriaxone today.

## 2025-07-07 NOTE — PROGRESS NOTES
Progress Note - Hospitalist   Name: Latrice Palacio 80 y.o. female I MRN: [id number]  Unit/Bed#: [facility] 621-01 I Date of Admission: 7/5/2025   Date of Service: 7/7/2025 I Hospital Day: 2    Assessment & Plan  Sepsis (HCC)  Present admission as evidenced by tachycardia and leukocytosis.  In the setting of pyelonephritis.  Received fluids per sepsis protocol.  Started on IV Zosyn upon admission  Lactic acid negative.  Procalcitonin markedly elevated.  Will trend  Blood cultures admission both sets positive for gram-negative rods.  BCI D with E. coli.  De-escalate antibiotics to ceftriaxone based on culture results.  Continue with IV fluids    Pyelonephritis  Cont zosyn started in ED (will cover GB as well if there is anything there on US)  F/U urine cx.  Urine culture with E. coli.  Antibiotic de-escalated to ceftriaxone today.  Acid reflux disease  IV PPI  Probiotics while on antibiotics  Hyperlipidemia  CPK mildly elevated.  Trend with IV fluid  CKD (chronic kidney disease) stage 2, GFR 60-89 ml/min  HERMINIA on CKD  Likely prerenal with decreased PO intake and elevated BUN  IVF  Repeat labs and if not improving may need further work up    Lab Results   Component Value Date    EGFR 51 07/07/2025    EGFR 52 07/06/2025    EGFR 41 07/05/2025    CREATININE 1.03 07/07/2025    CREATININE 1.02 07/06/2025    CREATININE 1.23 07/05/2025     Myalgia  Improved with hydration.  Abnormal LFTs  Likely in the setting of sepsis.  CT without any gallbladder pathology.  Follow-up on right upper quadrant ultrasound.  LFTs improving  Elevated troponin  EKG with sinus tachycardia and nonspecific ST-T wave changes.  Patient denies chest pain.  Troponins elevated.  Likely non-MI troponin elevation in the setting of sepsis  Follow-up on transthoracic echocardiogram  Started on low-dose beta-blocker because of elevated blood pressure  E. coli bacteremia  Both sets of admission blood cultures positive for gram-negative maci.  Likely in the setting of  urinary tract infection/pyelonephritis.  Started initially on IV Zosyn.  De-escalated to ceftriaxone today.  Electrolyte imbalance  Hyponatremia hypokalemia on admission in the setting of nausea vomiting poor oral intake and sepsis.  Improving with IV fluid resuscitation.  Continue to follow BMP closely.  Replace potassium today.    VTE Pharmacologic Prophylaxis:   High Risk (Score >/= 5) - Pharmacological DVT Prophylaxis Ordered: enoxaparin (Lovenox). Sequential Compression Devices Ordered.    Mobility:   Basic Mobility Inpatient Raw Score: 21  JH-HLM Goal: 6: Walk 10 steps or more  JH-HLM Achieved: 4: Move to chair/commode  JH-HLM Goal achieved. Continue to encourage appropriate mobility.    Patient Centered Rounds: I performed bedside rounds with nursing staff today.   Discussions with Specialists or Other Care Team Provider: Discussed with case management and nursing    Education and Discussions with Family / Patient: Attempted to update  (daughter) via phone. Unable to contact.    Current Length of Stay: 2 day(s)  Current Patient Status: Inpatient   Certification Statement: The patient will continue to require additional inpatient hospital stay due to ongoing IV antibiotic  Discharge Plan: Anticipate discharge in 24-48 hrs to home with home services.    Code Status: Level 3 - DNAR and DNI    Subjective   Patient seen and examined at bedside.  Tmax 100.9.  Still with some abdominal discomfort although improved than before.  Denies any nausea, vomiting.  Denies any chest comfort or shortness of breath    Objective :  Temp:  [98 °F (36.7 °C)-100.9 °F (38.3 °C)] 99.6 °F (37.6 °C)  HR:  [74-97] 82  BP: ()/(56-76) 119/65  Resp:  [16-18] 16  SpO2:  [93 %-95 %] 95 %  O2 Device: None (Room air)    Body mass index is 23.12 kg/m².     Input and Output Summary (last 24 hours):     Intake/Output Summary (Last 24 hours) at 7/7/2025 1156  Last data filed at 7/7/2025 0904  Gross per 24 hour   Intake 1426 ml    Output 1300 ml   Net 126 ml       Physical Exam  Constitutional:       General: She is not in acute distress.  HENT:      Mouth/Throat:      Mouth: Mucous membranes are moist.     Eyes:      Pupils: Pupils are equal, round, and reactive to light.       Cardiovascular:      Rate and Rhythm: Normal rate and regular rhythm.   Pulmonary:      Effort: Pulmonary effort is normal.      Breath sounds: Normal breath sounds.   Abdominal:      General: There is no distension.      Palpations: Abdomen is soft.      Tenderness: There is abdominal tenderness. There is right CVA tenderness.     Musculoskeletal:      Cervical back: Neck supple.      Right lower leg: No edema.      Left lower leg: No edema.     Skin:     General: Skin is warm.     Neurological:      General: No focal deficit present.      Mental Status: She is alert and oriented to person, place, and time. Mental status is at baseline.           Lines/Drains:  Lines/Drains/Airways       Active Status       Name Placement date Placement time Site Days    External Urinary Catheter 07/07/25  1130  -- less than 1                            Lab Results: I have reviewed the following results:   Results from last 7 days   Lab Units 07/07/25  0524 07/06/25  0520 07/05/25  1632   WBC Thousand/uL 17.32*   < > 17.68*   HEMOGLOBIN g/dL 10.3*   < > 13.0   HEMATOCRIT % 32.2*   < > 40.0   PLATELETS Thousands/uL 195   < > 218   BANDS PCT %  --   --  11*   SEGS PCT % 87*  --   --    LYMPHO PCT % 6*  --  2*   MONO PCT % 6  --  2*   EOS PCT % 0  --  0    < > = values in this interval not displayed.     Results from last 7 days   Lab Units 07/07/25  0524   SODIUM mmol/L 133*   POTASSIUM mmol/L 3.8   CHLORIDE mmol/L 106   CO2 mmol/L 20*   BUN mg/dL 17   CREATININE mg/dL 1.03   ANION GAP mmol/L 7   CALCIUM mg/dL 7.5*   ALBUMIN g/dL 2.9*   TOTAL BILIRUBIN mg/dL 0.33   ALK PHOS U/L 187*   ALT U/L 40   AST U/L 42*   GLUCOSE RANDOM mg/dL 150*     Results from last 7 days   Lab Units  07/05/25  1632   INR  1.02             Results from last 7 days   Lab Units 07/07/25  0524 07/05/25  1823 07/05/25  1632   LACTIC ACID mmol/L  --  0.9  --    PROCALCITONIN ng/ml 82.85*  --  246.26*       Recent Cultures (last 7 days):   Results from last 7 days   Lab Units 07/05/25  1707 07/05/25  1705 07/05/25  1632   BLOOD CULTURE   --  Escherichia coli* Escherichia coli*   GRAM STAIN RESULT   --  Gram negative rods* Gram negative rods*   URINE CULTURE  >100,000 cfu/ml Gram Negative Aureliano*  --   --              Last 24 Hours Medication List:     Current Facility-Administered Medications:     acetaminophen (TYLENOL) tablet 650 mg, Q6H PRN    albuterol (PROVENTIL HFA,VENTOLIN HFA) inhaler 2 puff, Q6H PRN    ascorbic acid (VITAMIN C) tablet 1,000 mg, Daily    aspirin (ECOTRIN LOW STRENGTH) EC tablet 81 mg, Daily    atorvastatin (LIPITOR) tablet 40 mg, Daily    cefTRIAXone (ROCEPHIN) 2,000 mg in dextrose 5 % 50 mL IVPB, Q24H, Last Rate: 2,000 mg (07/07/25 1028)    enoxaparin (LOVENOX) subcutaneous injection 40 mg, Daily    fluticasone (ARNUITY ELLIPTA) 200 MCG/ACT inhaler 1 puff, Daily    magnesium gluconate (MAGONATE) tablet 500 mg, HS    metoprolol tartrate (LOPRESSOR) tablet 25 mg, Q12H CORONA    NIFEdipine (PROCARDIA XL) 24 hr tablet 30 mg, Daily    ondansetron (ZOFRAN) injection 4 mg, Q6H PRN    pantoprazole (PROTONIX) injection 40 mg, Q24H CORONA    saccharomyces boulardii (FLORASTOR) capsule 250 mg, BID    sodium chloride 0.9 % infusion, Continuous, Last Rate: 125 mL/hr (07/07/25 0904)    Administrative Statements   Today, Patient Was Seen By: Sydney Antoine MD      **Please Note: This note may have been constructed using a voice recognition system.**

## 2025-07-07 NOTE — ASSESSMENT & PLAN NOTE
Present admission as evidenced by tachycardia and leukocytosis.  In the setting of pyelonephritis.  Received fluids per sepsis protocol.  Started on IV Zosyn upon admission  Lactic acid negative.  Procalcitonin markedly elevated.  Will trend  Blood cultures admission both sets positive for gram-negative rods.  BCI D with E. coli.  De-escalate antibiotics to ceftriaxone based on culture results.  Continue with IV fluids

## 2025-07-08 LAB
ANION GAP SERPL CALCULATED.3IONS-SCNC: 7 MMOL/L (ref 4–13)
BACTERIA BLD CULT: ABNORMAL
BACTERIA BLD CULT: ABNORMAL
BASOPHILS # BLD AUTO: 0.02 THOUSANDS/ÂΜL (ref 0–0.1)
BASOPHILS NFR BLD AUTO: 0 % (ref 0–1)
BUN SERPL-MCNC: 14 MG/DL (ref 5–25)
CALCIUM SERPL-MCNC: 7.5 MG/DL (ref 8.4–10.2)
CHLORIDE SERPL-SCNC: 106 MMOL/L (ref 96–108)
CO2 SERPL-SCNC: 21 MMOL/L (ref 21–32)
CREAT SERPL-MCNC: 0.91 MG/DL (ref 0.6–1.3)
E COLI DNA BLD POS QL NAA+NON-PROBE: DETECTED
EOSINOPHIL # BLD AUTO: 0.01 THOUSAND/ÂΜL (ref 0–0.61)
EOSINOPHIL NFR BLD AUTO: 0 % (ref 0–6)
ERYTHROCYTE [DISTWIDTH] IN BLOOD BY AUTOMATED COUNT: 13.9 % (ref 11.6–15.1)
GFR SERPL CREATININE-BSD FRML MDRD: 60 ML/MIN/1.73SQ M
GLUCOSE SERPL-MCNC: 119 MG/DL (ref 65–140)
GRAM STN SPEC: ABNORMAL
GRAM STN SPEC: ABNORMAL
HCT VFR BLD AUTO: 34.3 % (ref 34.8–46.1)
HGB BLD-MCNC: 11.1 G/DL (ref 11.5–15.4)
IMM GRANULOCYTES # BLD AUTO: 0.12 THOUSAND/UL (ref 0–0.2)
IMM GRANULOCYTES NFR BLD AUTO: 1 % (ref 0–2)
LYMPHOCYTES # BLD AUTO: 1.27 THOUSANDS/ÂΜL (ref 0.6–4.47)
LYMPHOCYTES NFR BLD AUTO: 14 % (ref 14–44)
MCH RBC QN AUTO: 29.8 PG (ref 26.8–34.3)
MCHC RBC AUTO-ENTMCNC: 32.4 G/DL (ref 31.4–37.4)
MCV RBC AUTO: 92 FL (ref 82–98)
MONOCYTES # BLD AUTO: 0.99 THOUSAND/ÂΜL (ref 0.17–1.22)
MONOCYTES NFR BLD AUTO: 11 % (ref 4–12)
NEUTROPHILS # BLD AUTO: 6.79 THOUSANDS/ÂΜL (ref 1.85–7.62)
NEUTS SEG NFR BLD AUTO: 74 % (ref 43–75)
NRBC BLD AUTO-RTO: 0 /100 WBCS
PLATELET # BLD AUTO: 229 THOUSANDS/UL (ref 149–390)
PMV BLD AUTO: 10.6 FL (ref 8.9–12.7)
POTASSIUM SERPL-SCNC: 3.4 MMOL/L (ref 3.5–5.3)
RBC # BLD AUTO: 3.72 MILLION/UL (ref 3.81–5.12)
SODIUM SERPL-SCNC: 134 MMOL/L (ref 135–147)
WBC # BLD AUTO: 9.2 THOUSAND/UL (ref 4.31–10.16)

## 2025-07-08 PROCEDURE — 80048 BASIC METABOLIC PNL TOTAL CA: CPT | Performed by: INTERNAL MEDICINE

## 2025-07-08 PROCEDURE — 85025 COMPLETE CBC W/AUTO DIFF WBC: CPT | Performed by: INTERNAL MEDICINE

## 2025-07-08 PROCEDURE — 99232 SBSQ HOSP IP/OBS MODERATE 35: CPT | Performed by: PHYSICIAN ASSISTANT

## 2025-07-08 RX ORDER — POTASSIUM CHLORIDE 1500 MG/1
40 TABLET, EXTENDED RELEASE ORAL ONCE
Status: COMPLETED | OUTPATIENT
Start: 2025-07-08 | End: 2025-07-08

## 2025-07-08 RX ADMIN — Medication 500 MG: at 21:15

## 2025-07-08 RX ADMIN — ACETAMINOPHEN 650 MG: 325 TABLET ORAL at 05:21

## 2025-07-08 RX ADMIN — Medication 12.5 MG: at 21:15

## 2025-07-08 RX ADMIN — Medication 3 MG: at 01:25

## 2025-07-08 RX ADMIN — ASPIRIN 81 MG: 81 TABLET ORAL at 08:27

## 2025-07-08 RX ADMIN — ACETAMINOPHEN 650 MG: 325 TABLET ORAL at 21:15

## 2025-07-08 RX ADMIN — FLUTICASONE FUROATE 1 PUFF: 200 POWDER RESPIRATORY (INHALATION) at 08:28

## 2025-07-08 RX ADMIN — Medication 3 MG: at 21:15

## 2025-07-08 RX ADMIN — PANTOPRAZOLE SODIUM 40 MG: 40 TABLET, DELAYED RELEASE ORAL at 05:20

## 2025-07-08 RX ADMIN — Medication 250 MG: at 17:48

## 2025-07-08 RX ADMIN — Medication 250 MG: at 08:27

## 2025-07-08 RX ADMIN — OXYCODONE HYDROCHLORIDE AND ACETAMINOPHEN 1000 MG: 500 TABLET ORAL at 08:27

## 2025-07-08 RX ADMIN — ENOXAPARIN SODIUM 40 MG: 40 INJECTION SUBCUTANEOUS at 08:26

## 2025-07-08 RX ADMIN — POTASSIUM CHLORIDE 40 MEQ: 1500 TABLET, EXTENDED RELEASE ORAL at 15:31

## 2025-07-08 RX ADMIN — NIFEDIPINE 30 MG: 30 TABLET, FILM COATED, EXTENDED RELEASE ORAL at 08:27

## 2025-07-08 RX ADMIN — ATORVASTATIN CALCIUM 40 MG: 40 TABLET, FILM COATED ORAL at 08:27

## 2025-07-08 RX ADMIN — DEXTROSE MONOHYDRATE 2000 MG: 50 INJECTION, SOLUTION INTRAVENOUS at 08:26

## 2025-07-08 NOTE — PLAN OF CARE
Problem: PAIN - ADULT  Goal: Verbalizes/displays adequate comfort level or baseline comfort level  Description: Interventions:  - Encourage patient to monitor pain and request assistance  - Assess pain using appropriate pain scale  - Administer analgesics as ordered based on type and severity of pain and evaluate response  - Implement non-pharmacological measures as appropriate and evaluate response  - Consider cultural and social influences on pain and pain management  - Notify physician/advanced practitioner if interventions unsuccessful or patient reports new pain  - Educate patient/family on pain management process including their role and importance of  reporting pain   - Provide non-pharmacologic/complimentary pain relief interventions  Outcome: Progressing     Problem: INFECTION - ADULT  Goal: Absence or prevention of progression during hospitalization  Description: INTERVENTIONS:  - Assess and monitor for signs and symptoms of infection  - Monitor lab/diagnostic results  - Monitor all insertion sites, i.e. indwelling lines, tubes, and drains  - Monitor endotracheal if appropriate and nasal secretions for changes in amount and color  - [place] appropriate cooling/warming therapies per order  - Administer medications as ordered  - Instruct and encourage patient and family to use good hand hygiene technique  - Identify and instruct in appropriate isolation precautions for identified infection/condition  Outcome: Progressing  Goal: Absence of fever/infection during neutropenic period  Description: INTERVENTIONS:  - Monitor WBC  - Perform strict hand hygiene  - Limit to healthy visitors only  - No plants, dried, fresh or silk flowers with mgcee in patient room  Outcome: Progressing     Problem: SAFETY ADULT  Goal: Patient will remain free of falls  Description: INTERVENTIONS:  - Educate patient/family on patient safety including physical limitations  - Instruct patient to call for assistance with activity   -  Consider consulting OT/PT to assist with strengthening/mobility based on AM PAC & JH-HLM score  - Consult OT/PT to assist with strengthening/mobility   - Keep Call bell within reach  - Keep bed low and locked with side rails adjusted as appropriate  - Keep care items and personal belongings within reach  - Initiate and maintain comfort rounds  - Make Fall Risk Sign visible to staff  - Offer Toileting every  Hours, in advance of need  - Initiate/Maintain alarm  - Obtain necessary fall risk management equipment:   - Apply yellow socks and bracelet for high fall risk patients  - Consider moving patient to room near nurses station  Outcome: Progressing  Goal: Maintain or return to baseline ADL function  Description: INTERVENTIONS:  -  Assess patient's ability to carry out ADLs; assess patient's baseline for ADL function and identify physical deficits which impact ability to perform ADLs (bathing, care of mouth/teeth, toileting, grooming, dressing, etc.)  - Assess/evaluate cause of self-care deficits   - Assess range of motion  - Assess patient's mobility; develop plan if impaired  - Assess patient's need for assistive devices and provide as appropriate  - Encourage maximum independence but intervene and supervise when necessary  - Involve family in performance of ADLs  - Assess for home care needs following discharge   - Consider OT consult to assist with ADL evaluation and planning for discharge  - Provide patient education as appropriate  - Monitor functional capacity and physical performance, use of AM PAC & JH-HLM   - Monitor gait, balance and fatigue with ambulation    Outcome: Progressing  Goal: Maintains/Returns to pre admission functional level  Description: INTERVENTIONS:  - Perform AM-PAC 6 Click Basic Mobility/ Daily Activity assessment daily.  - Set and communicate daily mobility goal to care team and patient/family/caregiver.   - Collaborate with rehabilitation services on mobility goals if consulted  -  Perform Range of Motion  times a day.  - Reposition patient every  hours.  - Dangle patient  times a day  - Stand patient  times a day  - Ambulate patient  times a day  - Out of bed to chair  times a day   - Out of bed for meals times a day  - Out of bed for toileting  - Record patient progress and toleration of activity level   Outcome: Progressing     Problem: DISCHARGE PLANNING  Goal: Discharge to home or other facility with appropriate resources  Description: INTERVENTIONS:  - Identify barriers to discharge w/patient and caregiver  - Arrange for needed discharge resources and transportation as appropriate  - Identify discharge learning needs (meds, wound care, etc.)  - Arrange for interpretive services to assist at discharge as needed  - Refer to Case Management Department for coordinating discharge planning if the patient needs post-hospital services based on physician/advanced practitioner order or complex needs related to functional status, cognitive ability, or social support system  Outcome: Progressing     Problem: Knowledge Deficit  Goal: Patient/family/caregiver demonstrates understanding of disease process, treatment plan, medications, and discharge instructions  Description: Complete learning assessment and assess knowledge base.  Interventions:  - Provide teaching at level of understanding  - Provide teaching via preferred learning methods  Outcome: Progressing     Problem: Nutrition/Hydration-ADULT  Goal: Nutrient/Hydration intake appropriate for improving, restoring or maintaining nutritional needs  Description: Monitor and assess patient's nutrition/hydration status for malnutrition. Collaborate with interdisciplinary team and initiate plan and interventions as ordered.  Monitor patient's weight and dietary intake as ordered or per policy. Utilize nutrition screening tool and intervene as necessary. Determine patient's food preferences and provide high-protein, high-caloric foods as appropriate.      INTERVENTIONS:  - Monitor oral intake, urinary output, labs, and treatment plans  - Assess nutrition and hydration status and recommend course of action  - Evaluate amount of meals eaten  - Assist patient with eating if necessary   - Allow adequate time for meals  - Recommend/ encourage appropriate diets, oral nutritional supplements, and vitamin/mineral supplements  - Order, calculate, and assess calorie counts as needed  - Recommend, monitor, and adjust tube feedings and TPN/PPN based on assessed needs  - Assess need for intravenous fluids  - Provide specific nutrition/hydration education as appropriate  - Include patient/family/caregiver in decisions related to nutrition  Outcome: Progressing

## 2025-07-08 NOTE — ASSESSMENT & PLAN NOTE
Hyponatremia hypokalemia on admission in the setting of nausea vomiting poor oral intake and sepsis.  Improving with IV fluid resuscitation.  Continue to follow BMP closely.  Replace potassium today.  BMP and mag in AM

## 2025-07-08 NOTE — ASSESSMENT & PLAN NOTE
Likely in the setting of sepsis.  CT without any gallbladder pathology.    Right Upper quadrant ultrasound revealed sludge in the otherwise unremarkable gallbladder

## 2025-07-08 NOTE — ASSESSMENT & PLAN NOTE
Likely non-MI troponin elevation in the setting of sepsis  Echocardiogram revealed left ventricular ejection fraction of 57%.  Normal wall motion.  Grade 1 diastolic dysfunction  Was started on low-dose beta-blocker prior because of elevated blood pressure

## 2025-07-08 NOTE — PROGRESS NOTES
Progress Note - Hospitalist   Name: Latrice Palacio 80 y.o. female I MRN: [id number]  Unit/Bed#: [facility] 621-01 I Date of Admission: 7/5/2025   Date of Service: 7/8/2025 I Hospital Day: 3    Assessment & Plan  Sepsis (HCC)  Present admission as evidenced by tachycardia and leukocytosis.  In the setting of pyelonephritis.  Received fluids per sepsis protocol.  Started on IV Zosyn upon admission  Lactic acid negative.  Procalcitonin markedly elevated.  Will trend  Blood cultures admission both sets positive for gram-negative rods.  BCI D with E. coli.  Antibiotics were changed to ceftriaxone prior based on culture results.  Continue with IV fluids    Pyelonephritis  Urine culture with E. coli.  Antibiotic de-escalated to ceftriaxone prior.  Acid reflux disease  Continue PPI  Hyperlipidemia  CPK mildly elevated.  Trend with IV fluid  CKD (chronic kidney disease) stage 2, GFR 60-89 ml/min  HERMINIA on CKD as above    Lab Results   Component Value Date    EGFR 60 07/08/2025    EGFR 51 07/07/2025    EGFR 52 07/06/2025    CREATININE 0.91 07/08/2025    CREATININE 1.03 07/07/2025    CREATININE 1.02 07/06/2025     Myalgia  Improved with hydration.  Abnormal LFTs  Likely in the setting of sepsis.  CT without any gallbladder pathology.    Right Upper quadrant ultrasound revealed sludge in the otherwise unremarkable gallbladder  Elevated troponin  Likely non-MI troponin elevation in the setting of sepsis  Echocardiogram revealed left ventricular ejection fraction of 57%.  Normal wall motion.  Grade 1 diastolic dysfunction  Was started on low-dose beta-blocker prior because of elevated blood pressure  E. coli bacteremia  Both sets of admission blood cultures positive for E coli.  Likely in the setting of urinary tract infection/pyelonephritis.  Started initially on IV Zosyn now on ceftriaxone.  Electrolyte imbalance  Hyponatremia hypokalemia on admission in the setting of nausea vomiting poor oral intake and sepsis.  Improving with IV  fluid resuscitation.  Continue to follow BMP closely.  Replace potassium today.  BMP and mag in AM    VTE Pharmacologic Prophylaxis:   lovenox    Mobility:   Basic Mobility Inpatient Raw Score: 21  JH-HLM Goal: 6: Walk 10 steps or more  JH-HLM Achieved: 5: Stand (1 or more minutes)  JH-HLM Goal NOT achieved. Continue with multidisciplinary rounding and encourage appropriate mobility to improve upon JH-HLM goals.    Patient Centered Rounds: I performed bedside rounds with nursing staff today.   Discussions with Specialists or Other Care Team Provider:     Education and Discussions with Family / Patient: Patient declined call to .     Current Length of Stay: 3 day(s)  Current Patient Status: Inpatient   Certification Statement: The patient will continue to require additional inpatient hospital stay due to IV abx, sepsis, bacteremia  Discharge Plan: Anticipate discharge in 24-48 hrs to home.    Code Status: Level 3 - DNAR and DNI    Subjective   Ms. Palacio denies CP, SOB, flank pain, abd pain    Objective :  Temp:  [98.5 °F (36.9 °C)-99.8 °F (37.7 °C)] 98.5 °F (36.9 °C)  HR:  [81-93] 84  BP: (104-133)/(58-65) 127/63  Resp:  [16-18] 16  SpO2:  [95 %-96 %] 96 %  O2 Device: None (Room air)    Body mass index is 23.12 kg/m².     Input and Output Summary (last 24 hours):     Intake/Output Summary (Last 24 hours) at 7/8/2025 1439  Last data filed at 7/8/2025 1248  Gross per 24 hour   Intake 803.5 ml   Output 1100 ml   Net -296.5 ml       Physical Exam  Vitals reviewed.   Constitutional:       Comments: Patient seen lying in bed, NAD     Cardiovascular:      Rate and Rhythm: Normal rate and regular rhythm.   Pulmonary:      Effort: Pulmonary effort is normal. No respiratory distress.      Breath sounds: Normal breath sounds.   Abdominal:      Palpations: Abdomen is soft.      Tenderness: There is no abdominal tenderness.     Musculoskeletal:      Right lower leg: No edema.      Left lower leg: No edema.      Skin:     General: Skin is warm.     Neurological:      Mental Status: She is alert.     Psychiatric:         Mood and Affect: Mood normal.           Lines/Drains:  Lines/Drains/Airways       Active Status       Name Placement date Placement time Site Days    External Urinary Catheter 07/07/25  1130  -- 1                            Lab Results: I have reviewed the following results:   Results from last 7 days   Lab Units 07/08/25  0527 07/06/25  0520 07/05/25  1632   WBC Thousand/uL 9.20   < > 17.68*   HEMOGLOBIN g/dL 11.1*   < > 13.0   HEMATOCRIT % 34.3*   < > 40.0   PLATELETS Thousands/uL 229   < > 218   BANDS PCT %  --   --  11*   SEGS PCT % 74   < >  --    LYMPHO PCT % 14   < > 2*   MONO PCT % 11   < > 2*   EOS PCT % 0   < > 0    < > = values in this interval not displayed.     Results from last 7 days   Lab Units 07/08/25  0527 07/07/25  0524   SODIUM mmol/L 134* 133*   POTASSIUM mmol/L 3.4* 3.8   CHLORIDE mmol/L 106 106   CO2 mmol/L 21 20*   BUN mg/dL 14 17   CREATININE mg/dL 0.91 1.03   ANION GAP mmol/L 7 7   CALCIUM mg/dL 7.5* 7.5*   ALBUMIN g/dL  --  2.9*   TOTAL BILIRUBIN mg/dL  --  0.33   ALK PHOS U/L  --  187*   ALT U/L  --  40   AST U/L  --  42*   GLUCOSE RANDOM mg/dL 119 150*     Results from last 7 days   Lab Units 07/05/25  1632   INR  1.02             Results from last 7 days   Lab Units 07/07/25  0524 07/05/25  1823 07/05/25  1632   LACTIC ACID mmol/L  --  0.9  --    PROCALCITONIN ng/ml 82.85*  --  246.26*       Recent Cultures (last 7 days):   Results from last 7 days   Lab Units 07/05/25  1707 07/05/25  1705 07/05/25  1632   BLOOD CULTURE   --  Escherichia coli* Escherichia coli*   GRAM STAIN RESULT   --  Gram negative rods* Gram negative rods*   URINE CULTURE  >100,000 cfu/ml Escherichia coli*  --   --        Imaging Results Review: I reviewed radiology reports from this admission including: RUQ US and CT abdomen/pelvis.      Last 24 Hours Medication List:     Current Facility-Administered  Medications:     acetaminophen (TYLENOL) tablet 650 mg, Q6H PRN    albuterol (PROVENTIL HFA,VENTOLIN HFA) inhaler 2 puff, Q6H PRN    ascorbic acid (VITAMIN C) tablet 1,000 mg, Daily    aspirin (ECOTRIN LOW STRENGTH) EC tablet 81 mg, Daily    atorvastatin (LIPITOR) tablet 40 mg, Daily    cefTRIAXone (ROCEPHIN) 2,000 mg in dextrose 5 % 50 mL IVPB, Q24H, Last Rate: 2,000 mg (07/08/25 0826)    enoxaparin (LOVENOX) subcutaneous injection 40 mg, Daily    fluticasone (ARNUITY ELLIPTA) 200 MCG/ACT inhaler 1 puff, Daily    magnesium gluconate (MAGONATE) tablet 500 mg, HS    melatonin tablet 3 mg, HS    metoprolol tartrate (LOPRESSOR) partial tablet 12.5 mg, Q12H CORONA    NIFEdipine (PROCARDIA XL) 24 hr tablet 30 mg, Daily    ondansetron (ZOFRAN) injection 4 mg, Q6H PRN    pantoprazole (PROTONIX) EC tablet 40 mg, Early Morning    potassium chloride (Klor-Con M20) CR tablet 40 mEq, Once    saccharomyces boulardii (FLORASTOR) capsule 250 mg, BID    Administrative Statements   Today, Patient Was Seen By: Rex Beckford PA-C      **Please Note: This note may have been constructed using a voice recognition system.**

## 2025-07-08 NOTE — ASSESSMENT & PLAN NOTE
Both sets of admission blood cultures positive for E coli.  Likely in the setting of urinary tract infection/pyelonephritis.  Started initially on IV Zosyn now on ceftriaxone.

## 2025-07-08 NOTE — ASSESSMENT & PLAN NOTE
Present admission as evidenced by tachycardia and leukocytosis.  In the setting of pyelonephritis.  Received fluids per sepsis protocol.  Started on IV Zosyn upon admission  Lactic acid negative.  Procalcitonin markedly elevated.  Will trend  Blood cultures admission both sets positive for gram-negative rods.  BCI D with E. coli.  Antibiotics were changed to ceftriaxone prior based on culture results.  Continue with IV fluids

## 2025-07-08 NOTE — ASSESSMENT & PLAN NOTE
HERMINIA on CKD as above    Lab Results   Component Value Date    EGFR 60 07/08/2025    EGFR 51 07/07/2025    EGFR 52 07/06/2025    CREATININE 0.91 07/08/2025    CREATININE 1.03 07/07/2025    CREATININE 1.02 07/06/2025

## 2025-07-08 NOTE — UTILIZATION REVIEW
Continued Stay Review    Date: 7/8/25                           Current Patient Class: Inpatient  Current Level of Care: Med surg     HPI:80 y.o. female initially admitted on  7/5/25     Current Diagnosis: Sepsis     Assessment/Plan: Hospitalist: UOP 1100 ml, Blood cultures admission both sets positive for gram-negative rods.  BCI D with E. coli. Urine culture with E. coli. Antibiotics were changed to ceftriaxone prior based on culture results.  Replace potassium today for K  3.4. IVF's dc on 7/7. Plan: c/w IV abx ceftriaxone, CPK mildly elevated, trend, Repeat BMP, MG in am 7/9.     Medications:   Scheduled Medications:  vitamin C, 1,000 mg, Oral, Daily  aspirin, 81 mg, Oral, Daily  atorvastatin, 40 mg, Oral, Daily  cefTRIAXone, 2,000 mg, Intravenous, Q24H  enoxaparin, 40 mg, Subcutaneous, Daily  fluticasone, 1 puff, Inhalation, Daily  magnesium gluconate, 500 mg, Oral, HS  melatonin, 3 mg, Oral, HS  metoprolol tartrate, 12.5 mg, Oral, Q12H CORONA  NIFEdipine ER, 30 mg, Oral, Daily  pantoprazole, 40 mg, Oral, Early Morning  saccharomyces boulardii, 250 mg, Oral, BID      Continuous IV Infusions: none      PRN Meds:  acetaminophen, 650 mg, Oral, Q6H PRN  albuterol, 2 puff, Inhalation, Q6H PRN  ondansetron, 4 mg, Intravenous, Q6H PRN      Discharge Plan: TBD     Vital Signs (last 3 days)       Date/Time Temp Pulse Resp BP MAP (mmHg) SpO2 O2 Device Patient Position - Orthostatic VS Ata Coma Scale Score CIWA-Ar Total Pain    07/08/25 1509 99.3 °F (37.4 °C) 91 16 129/62 84 96 % -- -- -- -- --    07/08/25 0837 -- -- -- -- -- -- -- -- 15 -- --    07/08/25 08:29:06 -- 84 -- 127/63 84 96 % -- -- -- -- --    07/08/25 07:17:06 98.5 °F (36.9 °C) 82 16 104/58 73 95 % -- -- -- -- --    07/08/25 0521 -- -- -- -- -- -- -- -- 15 -- 5    07/07/25 21:19:20 99.8 °F (37.7 °C) 93 18 133/62 86 95 % None (Room air) -- -- -- --    07/07/25 2020 -- -- -- -- -- -- -- -- 15 -- No Pain    07/07/25 14:41:18 98.7 °F (37.1 °C) 81 16 116/65 82 96  % -- -- -- -- --    07/07/25 1200 -- 82 -- 119/65 -- -- -- -- -- -- --    07/07/25 11:49:11 99.6 °F (37.6 °C) 82 16 119/65 83 95 % -- -- -- -- --    07/07/25 0700 -- -- -- -- -- -- -- -- -- 0 --    07/07/25 06:29:38 98.8 °F (37.1 °C) 88 -- 120/66 84 95 % -- -- -- -- --    07/07/25 0300 99.6 °F (37.6 °C) 87 -- 111/56 -- 94 % None (Room air) Lying -- -- --    07/06/25 22:35:09 98.9 °F (37.2 °C) 74 17 99/56 70 93 % -- -- -- -- --    07/06/25 2110 98.4 °F (36.9 °C) -- -- -- -- -- -- -- -- -- --    07/06/25 2000 -- -- -- -- -- -- None (Room air) -- -- -- 3    07/06/25 1933 -- -- -- -- -- -- -- -- -- -- Med Not Given for Pain - for MAR use only    07/06/25 19:17:27 100.9 °F (38.3 °C) 97 18 134/75 95 95 % -- -- -- -- --    07/06/25 1600 98 °F (36.7 °C) -- -- -- -- -- -- -- -- -- --    07/06/25 14:18:39 100.3 °F (37.9 °C) 95 -- 136/76 96 95 % -- -- -- -- --    07/06/25 1116 99.4 °F (37.4 °C) -- -- -- -- -- -- -- -- -- --    07/06/25 10:33:47 -- 101 -- 144/77 99 93 % -- -- -- -- --    07/06/25 1033 -- 101 -- 144/77 -- -- -- -- -- -- --    07/06/25 0924 -- -- -- -- -- -- -- -- -- -- 7    07/06/25 08:43:23 -- 102 -- 150/79 103 96 % -- -- -- -- --    07/06/25 0742 -- -- -- -- -- -- None (Room air) -- -- -- --    07/06/25 07:25:18 -- 104 -- 150/79 103 95 % -- -- -- -- --    07/06/25 0302 99.5 °F (37.5 °C) 103 16 150/81 104 95 % -- -- -- -- 8    07/06/25 03:01:34 99.5 °F (37.5 °C) -- -- 150/81 104 -- -- -- -- -- --    07/05/25 2300 -- 112 18 126/58 83 96 % None (Room air) -- -- -- --    07/05/25 1930 -- 108 18 123/59 85 97 % None (Room air) -- -- -- --    07/05/25 1830 -- 112 12 124/60 86 97 % None (Room air) Sitting -- -- No Pain    07/05/25 1702 -- 120 20 136/62 89 100 % None (Room air) Sitting -- -- No Pain    07/05/25 1600 -- 122 14 191/88 127 98 % None (Room air) Sitting -- -- --    07/05/25 1458 101.6 °F (38.7 °C) 120 20 180/74 -- 98 % None (Room air) -- -- -- --          Weight (last 2 days)       Date/Time Weight     07/07/25 1200 61.1 (134.7)    07/06/25 0302 61.1 (134.7)           CIWA-Ar Score       Row Name 07/07/25 0700             CIWA-Ar    Nausea and Vomiting 0      Tactile Disturbances 0      Tremor 0      Auditory Disturbances 0      Paroxysmal Sweats 0      Visual Disturbances 0      Anxiety 0      Headache, Fullness in Head 0      Agitation 0      Orientation and Clouding of Sensorium 0      CIWA-Ar Total 0                    Pertinent Labs/Diagnostic Results:   Radiology:  US right upper quadrant   Final Interpretation by Sunshine Rivas MD (07/07 1351)   Sludge in the otherwise unremarkable gallbladder.      Workstation performed: [id number]         CT abdomen pelvis with contrast   ED Interpretation by Olivier Chao DO (07/05 1913)   Right perinephric stranding c/w pyelonephritis, no stone, no appy      Final Interpretation by Fredi Junior DO (07/05 2248)      The right kidney is edematous in appearance. There is right perinephric stranding. No obstructing calcification along the course of either ureter. Finding could be on the basis of recently passed stone. Pyelonephritis of infectious/inflammatory etiology    is in the differential. Correlate clinically and with urinalysis.            Workstation performed: [id number]           Cardiology:  Echo complete w/ contrast if indicated   Final Result by Kirit Almanzar MD (07/07 1630)        Left Ventricle: Left ventricular cavity size is normal. Wall thickness    is mildly increased. There is mild concentric hypertrophy. The left    ventricular ejection fraction is 57% by biplane measurement. Systolic    function is normal. Wall motion is normal. Diastolic function is mildly    abnormal, consistent with grade I (abnormal) relaxation.     Right Ventricle: Right ventricular cavity size is normal. Systolic    function is normal.     Mitral Valve: There is mild regurgitation.     Tricuspid Valve: There is mild regurgitation.         ECG 12 lead  "  Final Result by Timo Borjas MD (07/07 0738)   Sinus tachycardia with 1st degree A-V block   Prolonged QT   Abnormal ECG   When compared with ECG of 05-Jul-2025 15:04,   IA interval has increased      Confirmed by Timo Borjas ([id number]) on 7/7/2025 7:38:39 AM      ECG 12 lead   Final Result by Timo Borjas MD (07/06 0723)   Sinus tachycardia   Left axis deviation   Poor R Wave Progression   Abnormal ECG   When compared with ECG of 16-Feb-2014 08:13,   Vent. rate has increased by  45 bpm      Confirmed by Timo Brojas ([id number]) on 7/6/2025 7:23:10 AM        GI:  No orders to display           Results from last 7 days   Lab Units 07/08/25 0527 07/07/25 0524 07/06/25 0520 07/05/25  1632   WBC Thousand/uL 9.20 17.32* 20.70* 17.68*   HEMOGLOBIN g/dL 11.1* 10.3* 11.6 13.0   HEMATOCRIT % 34.3* 32.2* 36.2 40.0   PLATELETS Thousands/uL 229 195 202 218   TOTAL NEUT ABS Thousands/µL 6.79 15.19*  --   --    BANDS PCT %  --   --   --  11*         Results from last 7 days   Lab Units 07/08/25 0527 07/07/25 0524 07/06/25  0520 07/05/25  1632   SODIUM mmol/L 134* 133* 134* 129*   POTASSIUM mmol/L 3.4* 3.8 3.3* 3.9   CHLORIDE mmol/L 106 106 98 93*   CO2 mmol/L 21 20* 23 20*   ANION GAP mmol/L 7 7 13 16*   BUN mg/dL 14 17 21 32*   CREATININE mg/dL 0.91 1.03 1.02 1.23   EGFR ml/min/1.73sq m 60 51 52 41   CALCIUM mg/dL 7.5* 7.5* 8.0* 9.0   MAGNESIUM mg/dL  --   --   --  2.2   PHOSPHORUS mg/dL  --   --   --  2.4     Results from last 7 days   Lab Units 07/07/25  0524 07/06/25  0520 07/05/25  1632   AST U/L 42* 52* 77*   ALT U/L 40 51 73*   ALK PHOS U/L 187* 189* 195*   TOTAL PROTEIN g/dL 6.3* 6.9 8.0   ALBUMIN g/dL 2.9* 3.3* 3.9   TOTAL BILIRUBIN mg/dL 0.33 0.41 0.56         Results from last 7 days   Lab Units 07/08/25  0527 07/07/25  0524 07/06/25  0520 07/05/25  1632   GLUCOSE RANDOM mg/dL 119 150* 120 148*             No results found for: \"BETA-HYDROXYBUTYRATE\"               Results from last 7 days   Lab Units " 07/05/25  1632   CK TOTAL U/L 636*     Results from last 7 days   Lab Units 07/06/25  0943 07/06/25  0818 07/06/25  0520   HS TNI 0HR ng/L  --   --  808*   HS TNI 2HR ng/L  --  771*  --    HSTNI D2 ng/L  --  -37  --    HS TNI 4HR ng/L 638*  --   --    HSTNI D4 ng/L -170  --   --          Results from last 7 days   Lab Units 07/05/25  1632   PROTIME seconds 13.7   INR  1.02   PTT seconds 27         Results from last 7 days   Lab Units 07/07/25  0524 07/05/25  1632   PROCALCITONIN ng/ml 82.85* 246.26*     Results from last 7 days   Lab Units 07/05/25  1823   LACTIC ACID mmol/L 0.9                                 Results from last 7 days   Lab Units 07/05/25  1632   LIPASE u/L 32     Results from last 7 days   Lab Units 07/06/25  0520   CRP mg/L 409.3*   SED RATE mm/hour >130*             Results from last 7 days   Lab Units 07/05/25  1707   CLARITY UA  Turbid   COLOR UA  Light Yellow   SPEC GRAV UA  1.019   PH UA  6.0   GLUCOSE UA mg/dl Negative   KETONES UA mg/dl 60 (2+)*   BLOOD UA  Moderate*   PROTEIN UA mg/dl 200 (2+)*   NITRITE UA  Positive*   BILIRUBIN UA  Negative   UROBILINOGEN UA (BE) mg/dl <2.0   LEUKOCYTES UA  Moderate*   WBC UA /hpf Innumerable*   RBC UA /hpf 20-30*   BACTERIA UA /hpf Occasional   EPITHELIAL CELLS WET PREP /hpf Occasional   MUCUS THREADS  Occasional*                                 Results from last 7 days   Lab Units 07/05/25  1707 07/05/25  1705 07/05/25  1632   BLOOD CULTURE   --  Escherichia coli* Escherichia coli*   GRAM STAIN RESULT   --  Gram negative rods* Gram negative rods*   URINE CULTURE  >100,000 cfu/ml Escherichia coli*  --   --                    Network Utilization Review Department  ATTENTION: Please call with any questions or concerns to [phone] and carefully listen to the prompts so that you are directed to the right person. All voicemails are confidential.   For Discharge needs, contact Care Management DC Support Team at [phone] opt. [phone]  Send all requests for  admission clinical reviews, approved or denied determinations and any other requests to dedicated fax number below belonging to the campus where the patient is receiving treatment. List of dedicated fax numbers for the Facilities:  FACILITY NAME UR FAX NUMBER   ADMISSION DENIALS (Administrative/Medical Necessity) [phone]   DISCHARGE SUPPORT TEAM (NETWORK) [phone]   PARENT CHILD HEALTH (Maternity/NICU/Pediatrics) [phone]   [facility] [phone]   [facility] [phone]   [facility] [phone]   [facility] [phone]   [facility] [phone]   [facility] [phone]   [facility] [phone]   [facility] [phone]   [facility] [phone]   [facility] [phone]   [facility] [phone]   [facility] [phone]

## 2025-07-09 LAB
ANION GAP SERPL CALCULATED.3IONS-SCNC: 7 MMOL/L (ref 4–13)
BUN SERPL-MCNC: 11 MG/DL (ref 5–25)
CALCIUM SERPL-MCNC: 8.1 MG/DL (ref 8.4–10.2)
CHLORIDE SERPL-SCNC: 107 MMOL/L (ref 96–108)
CO2 SERPL-SCNC: 23 MMOL/L (ref 21–32)
CREAT SERPL-MCNC: 0.75 MG/DL (ref 0.6–1.3)
ERYTHROCYTE [DISTWIDTH] IN BLOOD BY AUTOMATED COUNT: 13.9 % (ref 11.6–15.1)
GFR SERPL CREATININE-BSD FRML MDRD: 76 ML/MIN/1.73SQ M
GLUCOSE SERPL-MCNC: 111 MG/DL (ref 65–140)
HCT VFR BLD AUTO: 35.4 % (ref 34.8–46.1)
HGB BLD-MCNC: 11.4 G/DL (ref 11.5–15.4)
MAGNESIUM SERPL-MCNC: 2.2 MG/DL (ref 1.9–2.7)
MCH RBC QN AUTO: 29.5 PG (ref 26.8–34.3)
MCHC RBC AUTO-ENTMCNC: 32.2 G/DL (ref 31.4–37.4)
MCV RBC AUTO: 92 FL (ref 82–98)
PLATELET # BLD AUTO: 290 THOUSANDS/UL (ref 149–390)
PMV BLD AUTO: 10.2 FL (ref 8.9–12.7)
POTASSIUM SERPL-SCNC: 3.7 MMOL/L (ref 3.5–5.3)
PROCALCITONIN SERPL-MCNC: 17.2 NG/ML
RBC # BLD AUTO: 3.86 MILLION/UL (ref 3.81–5.12)
SODIUM SERPL-SCNC: 137 MMOL/L (ref 135–147)
WBC # BLD AUTO: 8.33 THOUSAND/UL (ref 4.31–10.16)

## 2025-07-09 PROCEDURE — 85027 COMPLETE CBC AUTOMATED: CPT | Performed by: PHYSICIAN ASSISTANT

## 2025-07-09 PROCEDURE — 83735 ASSAY OF MAGNESIUM: CPT | Performed by: PHYSICIAN ASSISTANT

## 2025-07-09 PROCEDURE — 80048 BASIC METABOLIC PNL TOTAL CA: CPT | Performed by: PHYSICIAN ASSISTANT

## 2025-07-09 PROCEDURE — 84145 PROCALCITONIN (PCT): CPT | Performed by: PHYSICIAN ASSISTANT

## 2025-07-09 PROCEDURE — 99239 HOSP IP/OBS DSCHRG MGMT >30: CPT | Performed by: PHYSICIAN ASSISTANT

## 2025-07-09 RX ORDER — METOPROLOL TARTRATE 25 MG/1
12.5 TABLET, FILM COATED ORAL EVERY 12 HOURS SCHEDULED
Qty: 30 TABLET | Refills: 0 | Status: SHIPPED | OUTPATIENT
Start: 2025-07-09

## 2025-07-09 RX ORDER — CEFPODOXIME PROXETIL 200 MG/1
400 TABLET, FILM COATED ORAL 2 TIMES DAILY
Qty: 36 TABLET | Refills: 0 | Status: SHIPPED | OUTPATIENT
Start: 2025-07-09 | End: 2025-07-18

## 2025-07-09 RX ADMIN — ALBUTEROL SULFATE 2 PUFF: 90 AEROSOL, METERED RESPIRATORY (INHALATION) at 09:09

## 2025-07-09 RX ADMIN — Medication 250 MG: at 09:09

## 2025-07-09 RX ADMIN — NIFEDIPINE 30 MG: 30 TABLET, FILM COATED, EXTENDED RELEASE ORAL at 09:09

## 2025-07-09 RX ADMIN — PANTOPRAZOLE SODIUM 40 MG: 40 TABLET, DELAYED RELEASE ORAL at 05:44

## 2025-07-09 RX ADMIN — Medication 12.5 MG: at 09:08

## 2025-07-09 RX ADMIN — ENOXAPARIN SODIUM 40 MG: 40 INJECTION SUBCUTANEOUS at 09:08

## 2025-07-09 RX ADMIN — ACETAMINOPHEN 650 MG: 325 TABLET ORAL at 05:44

## 2025-07-09 RX ADMIN — OXYCODONE HYDROCHLORIDE AND ACETAMINOPHEN 1000 MG: 500 TABLET ORAL at 09:08

## 2025-07-09 RX ADMIN — ATORVASTATIN CALCIUM 40 MG: 40 TABLET, FILM COATED ORAL at 09:08

## 2025-07-09 RX ADMIN — ASPIRIN 81 MG: 81 TABLET ORAL at 09:08

## 2025-07-09 RX ADMIN — DEXTROSE MONOHYDRATE 2000 MG: 50 INJECTION, SOLUTION INTRAVENOUS at 09:08

## 2025-07-09 NOTE — CASE MANAGEMENT
Case Management Discharge Planning Note    Patient name Latrice Palacio  Location [facility] 621/[facility] 621-01 MRN [id number]  : 1945 Date 2025       Current Admission Date: 2025  Current Admission Diagnosis:Sepsis (HCC)   Patient Active Problem List    Diagnosis Date Noted    HERMINIA (acute kidney injury) (HCC) 2025    Pyelonephritis 2025    Myalgia 2025    Abnormal LFTs 2025    Elevated troponin 2025    Sepsis (HCC) 2025    E. coli bacteremia 2025    Electrolyte imbalance 2025    Arthritis 2025    CKD (chronic kidney disease) stage 2, GFR 60-89 ml/min 10/31/2024    Osteoporosis without current pathological fracture 2023    Mild persistent asthma without complication 2023    Diarrhea 2023    Primary hypertension 06/15/2022    Annual physical exam 2021    Vitamin D insufficiency 2020    Chronic pain of both knees 2019    Chronic midline low back pain without sciatica 2019    Prediabetes 2015    Acid reflux disease 2012    Hyperlipidemia 2012    Osteoarthrosis, hand 2012      LOS (days): 4  Geometric Mean LOS (GMLOS) (days):   Days to GMLOS:     OBJECTIVE:  Risk of Unplanned Readmission Score: 14.57   Current admission status: Inpatient   Preferred Pharmacy:   [id number] - [place] [place] [address] [place] [address]  Phone: [phone] Fax: [phone]    Primary Care Provider: Subhash Leonard MD    Primary Insurance: ARDACO  Secondary Insurance: MEDICARE    DISCHARGE DETAILS:    Discharge planning discussed with:: patient  Freedom of Choice: Yes  Comments - Freedom of Choice: Discussed FOC  CM contacted family/caregiver?: No- see comments  Were Treatment Team discharge recommendations reviewed with patient/caregiver?: Yes  Did patient/caregiver verbalize understanding of patient care needs?: Yes  Were patient/caregiver advised of  the risks associated with not following Treatment Team discharge recommendations?: Yes    Other Referral/Resources/Interventions Provided:  Financial Resources Provided: Indigent Transportation  Transport at Discharge : Ride Share     Number/Name of Dispatcher: [facility]  Transported by (Company and Unit #): [employer]  ETA of Transport (Date): 07/09/25  ETA of Transport (Time): 1600

## 2025-07-09 NOTE — PLAN OF CARE
Problem: PAIN - ADULT  Goal: Verbalizes/displays adequate comfort level or baseline comfort level  Description: Interventions:  - Encourage patient to monitor pain and request assistance  - Assess pain using appropriate pain scale  - Administer analgesics as ordered based on type and severity of pain and evaluate response  - Implement non-pharmacological measures as appropriate and evaluate response  - Consider cultural and social influences on pain and pain management  - Notify physician/advanced practitioner if interventions unsuccessful or patient reports new pain  - Educate patient/family on pain management process including their role and importance of  reporting pain   - Provide non-pharmacologic/complimentary pain relief interventions  Outcome: Progressing     Problem: INFECTION - ADULT  Goal: Absence or prevention of progression during hospitalization  Description: INTERVENTIONS:  - Assess and monitor for signs and symptoms of infection  - Monitor lab/diagnostic results  - Monitor all insertion sites, i.e. indwelling lines, tubes, and drains  - Monitor endotracheal if appropriate and nasal secretions for changes in amount and color  - [place] appropriate cooling/warming therapies per order  - Administer medications as ordered  - Instruct and encourage patient and family to use good hand hygiene technique  - Identify and instruct in appropriate isolation precautions for identified infection/condition  Outcome: Progressing  Goal: Absence of fever/infection during neutropenic period  Description: INTERVENTIONS:  - Monitor WBC  - Perform strict hand hygiene  - Limit to healthy visitors only  - No plants, dried, fresh or silk flowers with mcgee in patient room  Outcome: Progressing     Problem: SAFETY ADULT  Goal: Patient will remain free of falls  Description: INTERVENTIONS:  - Educate patient/family on patient safety including physical limitations  - Instruct patient to call for assistance with activity   -  Consider consulting OT/PT to assist with strengthening/mobility based on AM PAC & JH-HLM score  - Consult OT/PT to assist with strengthening/mobility   - Keep Call bell within reach  - Keep bed low and locked with side rails adjusted as appropriate  - Keep care items and personal belongings within reach  - Initiate and maintain comfort rounds  - Make Fall Risk Sign visible to staff  - Offer Toileting every 2 Hours, in advance of need  - Initiate/Maintain bed/chair alarm  - Obtain necessary fall risk management equipment:   - Apply yellow socks and bracelet for high fall risk patients  - Consider moving patient to room near nurses station  Outcome: Progressing  Goal: Maintain or return to baseline ADL function  Description: INTERVENTIONS:  -  Assess patient's ability to carry out ADLs; assess patient's baseline for ADL function and identify physical deficits which impact ability to perform ADLs (bathing, care of mouth/teeth, toileting, grooming, dressing, etc.)  - Assess/evaluate cause of self-care deficits   - Assess range of motion  - Assess patient's mobility; develop plan if impaired  - Assess patient's need for assistive devices and provide as appropriate  - Encourage maximum independence but intervene and supervise when necessary  - Involve family in performance of ADLs  - Assess for home care needs following discharge   - Consider OT consult to assist with ADL evaluation and planning for discharge  - Provide patient education as appropriate  - Monitor functional capacity and physical performance, use of AM PAC & JH-HLM   - Monitor gait, balance and fatigue with ambulation    Outcome: Progressing  Goal: Maintains/Returns to pre admission functional level  Description: INTERVENTIONS:  - Perform AM-PAC 6 Click Basic Mobility/ Daily Activity assessment daily.  - Set and communicate daily mobility goal to care team and patient/family/caregiver.   - Collaborate with rehabilitation services on mobility goals if  consulted  - Perform Range of Motion 3 times a day.  - Reposition patient every 2 hours.  - Dangle patient 3 times a day  - Stand patient 3 times a day  - Ambulate patient 3 times a day  - Out of bed to chair 3 times a day   - Out of bed for meals 3 times a day  - Out of bed for toileting  - Record patient progress and toleration of activity level   Outcome: Progressing     Problem: DISCHARGE PLANNING  Goal: Discharge to home or other facility with appropriate resources  Description: INTERVENTIONS:  - Identify barriers to discharge w/patient and caregiver  - Arrange for needed discharge resources and transportation as appropriate  - Identify discharge learning needs (meds, wound care, etc.)  - Arrange for interpretive services to assist at discharge as needed  - Refer to Case Management Department for coordinating discharge planning if the patient needs post-hospital services based on physician/advanced practitioner order or complex needs related to functional status, cognitive ability, or social support system  Outcome: Progressing     Problem: Knowledge Deficit  Goal: Patient/family/caregiver demonstrates understanding of disease process, treatment plan, medications, and discharge instructions  Description: Complete learning assessment and assess knowledge base.  Interventions:  - Provide teaching at level of understanding  - Provide teaching via preferred learning methods  Outcome: Progressing     Problem: Nutrition/Hydration-ADULT  Goal: Nutrient/Hydration intake appropriate for improving, restoring or maintaining nutritional needs  Description: Monitor and assess patient's nutrition/hydration status for malnutrition. Collaborate with interdisciplinary team and initiate plan and interventions as ordered.  Monitor patient's weight and dietary intake as ordered or per policy. Utilize nutrition screening tool and intervene as necessary. Determine patient's food preferences and provide high-protein, high-caloric  foods as appropriate.     INTERVENTIONS:  - Monitor oral intake, urinary output, labs, and treatment plans  - Assess nutrition and hydration status and recommend course of action  - Evaluate amount of meals eaten  - Assist patient with eating if necessary   - Allow adequate time for meals  - Recommend/ encourage appropriate diets, oral nutritional supplements, and vitamin/mineral supplements  - Order, calculate, and assess calorie counts as needed  - Recommend, monitor, and adjust tube feedings and TPN/PPN based on assessed needs  - Assess need for intravenous fluids  - Provide specific nutrition/hydration education as appropriate  - Include patient/family/caregiver in decisions related to nutrition  Outcome: Progressing

## 2025-07-09 NOTE — DISCHARGE SUMMARY
Discharge Summary - Hospitalist   Name: Latrice Palacio 80 y.o. female I MRN: [id number]  Unit/Bed#: [facility] 621-01 I Date of Admission: 7/5/2025   Date of Service: 7/9/2025 I Hospital Day: 4     Assessment & Plan  Sepsis (HCC)  Present admission as evidenced by tachycardia and leukocytosis.  In the setting of pyelonephritis.  Received fluids per sepsis protocol.  Started on IV Zosyn upon admission  Lactic acid negative.  Procalcitonin markedly elevated, improved  Blood cultures admission both sets positive for gram-negative rods.  BCI D with E. coli.  Antibiotics were changed to ceftriaxone prior based on culture results.  Transition to cefpodoxime 400mg BID x 9 days to complete 14 day total abx course on discharge    Pyelonephritis  Urine culture with E. coli.  Antibiotic de-escalated to ceftriaxone prior - transition to cefpodoxime 400mg BID x 9 days on discharge to complete 14 day total abx course  Acid reflux disease  Continue PPI  Hyperlipidemia  F/u w/ PCP  CKD (chronic kidney disease) stage 2, GFR 60-89 ml/min  HERMINIA on CKD as above    Lab Results   Component Value Date    EGFR 76 07/09/2025    EGFR 60 07/08/2025    EGFR 51 07/07/2025    CREATININE 0.75 07/09/2025    CREATININE 0.91 07/08/2025    CREATININE 1.03 07/07/2025     Myalgia  Improved with hydration.  Abnormal LFTs  Likely in the setting of sepsis.  CT without any gallbladder pathology.    Right Upper quadrant ultrasound revealed sludge in the otherwise unremarkable gallbladder  Elevated troponin  Likely non-MI troponin elevation in the setting of sepsis  Echocardiogram revealed left ventricular ejection fraction of 57%.  Normal wall motion.  Grade 1 diastolic dysfunction  Was started on low-dose beta-blocker prior because of elevated blood pressure  E. coli bacteremia  Both sets of admission blood cultures positive for E coli.  Likely in the setting of urinary tract infection/pyelonephritis.  Started initially on IV Zosyn, then ceftriaxone - now  "transition to cefpodoxime 400mg BID x 9 days to complete 14 day total abx course  Electrolyte imbalance  Hyponatremia hypokalemia on admission in the setting of nausea vomiting poor oral intake and sepsis  Resolved      Medical Problems       Resolved Problems  Date Reviewed: 7/9/2025   None       Discharging Physician / Practitioner: Rex Beckford PA-C  PCP: Subhash Leonard MD  Admission Date:   Admission Orders (From admission, onward)       Ordered        07/05/25 2347  INPATIENT ADMISSION  Once                          Discharge Date: 07/09/25    Next Steps for Physician/AP Assuming Care:  F/u w/ PCP    Test Results Pending at Discharge (will require follow up):  None    Medication Changes for Discharge & Rationale:   +cefpodoxime 400mg BID x 9 days  +metoprolol tartrate 12.5mg BID  See after visit summary for reconciled discharge medications provided to patient and/or family.     Consultations During Hospital Stay:  None    Procedures Performed:   CT abd/pelv  RUQ US  Echo  CBC  CMP/BMP  Procal  Blood cultures  Urine culture    Significant Findings / Test Results:   CT abd/pelv: \"The right kidney is edematous in appearance. There is right perinephric stranding. No obstructing calcification along the course of either ureter. Finding could be on the basis of recently passed stone. Pyelonephritis of infectious/inflammatory etiology is in the differential. Correlate clinically and with urinalysis.\"  RUQ US: \"Sludge in the otherwise unremarkable gallbladder.\"  Echo: LVEF 57%, grade 1 DD  Leukocytosis  Hyponatremia  Procal: 246.26, 82.85, 17.20  Blood cultures: E. coli  Urine culture: >100,000 E. coli    Incidental Findings:   None     Hospital Course:   Latrice Palacio is a 80 y.o. female patient who originally presented to the hospital on 7/5/2025 due to suprapubic abdominal pain, nausea, vomiting, myalgia, and headaches. She met sepsis criteria on admission as evidenced by tachycardia and leukocytosis in the " "setting of pyelo. She was started on Zosyn on admission. Urine and blood cultures grew E. Coli. IV abx were changed to ceftriaxone. Discussed with ID Pharmacist, transitioned to cefpodoxime 400mg BID x 9 days on discharge to complete 14 day total abx course.    Please see above list of diagnoses and related plan for additional information.     Discharge Day Visit / Exam:   Subjective:    Ms. Palacio denies complaint and wants to go home. Denies feeling feverish, chills, flank pain, abdominal pain, nausea, vomiting, CP or SOB    Vitals: Blood Pressure: 151/73 (07/09/25 1413)  Pulse: 90 (07/09/25 1413)  Temperature: 97.8 °F (36.6 °C) (07/09/25 1413)  Temp Source: Oral (07/07/25 0300)  Respirations: 18 (07/09/25 1413)  Height: 5' 4\" (162.6 cm) (07/07/25 1200)  Weight - Scale: 61.1 kg (134 lb 11.2 oz) (07/07/25 1200)  SpO2: 98 % (07/09/25 1413)  Physical Exam  Vitals reviewed.   Constitutional:       Comments: Patient seen sitting in bed, NAD     Cardiovascular:      Rate and Rhythm: Normal rate and regular rhythm.   Pulmonary:      Effort: Pulmonary effort is normal. No respiratory distress.      Breath sounds: Normal breath sounds.   Abdominal:      Palpations: Abdomen is soft.      Tenderness: There is no abdominal tenderness.     Musculoskeletal:      Right lower leg: No edema.      Left lower leg: No edema.     Skin:     General: Skin is warm.     Neurological:      Mental Status: She is alert and oriented to person, place, and time.     Psychiatric:         Mood and Affect: Mood normal.          Discussion with Family: Patient declined call to .     Discharge instructions/Information to patient and family:   See after visit summary for information provided to patient and family.      Provisions for Follow-Up Care:  See after visit summary for information related to follow-up care and any pertinent home health orders.      Mobility at time of Discharge:   Basic Mobility Inpatient Raw Score: 19  -M " Goal: 6: Walk 10 steps or more  JH-HLM Achieved: 6: Walk 10 steps or more  HLM Goal achieved. Continue to encourage appropriate mobility.     Disposition:   Home    Planned Readmission: None    Administrative Statements   Discharge Statement:  I have spent a total time of 40 minutes in caring for this patient on the day of the visit/encounter. >30 minutes of time was spent on: Diagnostic results, Risks and benefits of tx options, Instructions for management, Patient and family education, Importance of tx compliance, Risk factor reductions, Impressions, Counseling / Coordination of care, Documenting in the medical record, Reviewing / ordering tests, medicine, procedures  , and Communicating with other healthcare professionals .    **Please Note: This note may have been constructed using a voice recognition system**

## 2025-07-09 NOTE — ASSESSMENT & PLAN NOTE
Hyponatremia hypokalemia on admission in the setting of nausea vomiting poor oral intake and sepsis  Resolved

## 2025-07-09 NOTE — ASSESSMENT & PLAN NOTE
HERMINIA on CKD as above    Lab Results   Component Value Date    EGFR 76 07/09/2025    EGFR 60 07/08/2025    EGFR 51 07/07/2025    CREATININE 0.75 07/09/2025    CREATININE 0.91 07/08/2025    CREATININE 1.03 07/07/2025

## 2025-07-09 NOTE — ASSESSMENT & PLAN NOTE
Urine culture with E. coli.  Antibiotic de-escalated to ceftriaxone prior - transition to cefpodoxime 400mg BID x 9 days on discharge to complete 14 day total abx course

## 2025-07-09 NOTE — ASSESSMENT & PLAN NOTE
Present admission as evidenced by tachycardia and leukocytosis.  In the setting of pyelonephritis.  Received fluids per sepsis protocol.  Started on IV Zosyn upon admission  Lactic acid negative.  Procalcitonin markedly elevated, improved  Blood cultures admission both sets positive for gram-negative rods.  BCI D with E. coli.  Antibiotics were changed to ceftriaxone prior based on culture results.  Transition to cefpodoxime 400mg BID x 9 days to complete 14 day total abx course on discharge

## 2025-07-09 NOTE — ASSESSMENT & PLAN NOTE
Both sets of admission blood cultures positive for E coli.  Likely in the setting of urinary tract infection/pyelonephritis.  Started initially on IV Zosyn, then ceftriaxone - now transition to cefpodoxime 400mg BID x 9 days to complete 14 day total abx course

## 2025-07-10 ENCOUNTER — TRANSITIONAL CARE MANAGEMENT (OUTPATIENT)
Dept: FAMILY MEDICINE CLINIC | Facility: CLINIC | Age: 80
End: 2025-07-10

## 2025-07-10 VITALS
SYSTOLIC BLOOD PRESSURE: 107 MMHG | HEART RATE: 87 BPM | DIASTOLIC BLOOD PRESSURE: 64 MMHG | RESPIRATION RATE: 18 BRPM | OXYGEN SATURATION: 95 % | WEIGHT: 134.7 LBS | TEMPERATURE: 99.4 F | HEIGHT: 64 IN | BODY MASS INDEX: 23 KG/M2

## 2025-07-10 NOTE — UTILIZATION REVIEW
NOTIFICATION OF ADMISSION DISCHARGE   This is a Notification of Discharge from [facility]. Please be advised that this patient has been discharge from our facility. Below you will find the admission and discharge date and time including the patient’s disposition.   UTILIZATION REVIEW CONTACT:  Utilization Review Assistants  Network Utilization Review Department  Phone: [phone] x carefully listen to the prompts. All voicemails are confidential.  Email: [email or web address]     ADMISSION INFORMATION  PRESENTATION DATE: 7/5/2025  3:52 PM  OBERVATION ADMISSION DATE: N/A  INPATIENT ADMISSION DATE: 7/5/25 11:47 PM   DISCHARGE DATE: 7/9/2025  5:05 PM   DISPOSITION:Home/Self Care    Network Utilization Review Department  ATTENTION: Please call with any questions or concerns to [phone] and carefully listen to the prompts so that you are directed to the right person. All voicemails are confidential.   For Discharge needs, contact Care Management DC Support Team at [phone] opt. 2  Send all requests for admission clinical reviews, approved or denied determinations and any other requests to dedicated fax number below belonging to the campus where the patient is receiving treatment. List of dedicated fax numbers for the Facilities:  FACILITY NAME UR FAX NUMBER   ADMISSION DENIALS (Administrative/Medical Necessity) [phone]   DISCHARGE SUPPORT TEAM ([facility]) [phone]   PARENT CHILD HEALTH (Maternity/NICU/Pediatrics) [phone]   [facility] [phone]   [facility] [phone]   [facility] [phone]   [facility] [phone]   [facility] [phone]   [facility] [phone]   [facility] [phone]   [facility] [phone]   [facility]  [facility] [phone]   On license of [facility] [phone]   [facility] [phone]   [facility] [phone]

## 2025-07-23 ENCOUNTER — TRANSITIONAL CARE MANAGEMENT (OUTPATIENT)
Dept: FAMILY MEDICINE CLINIC | Facility: CLINIC | Age: 80
End: 2025-07-23

## 2025-07-23 ENCOUNTER — OFFICE VISIT (OUTPATIENT)
Dept: FAMILY MEDICINE CLINIC | Facility: CLINIC | Age: 80
End: 2025-07-23
Payer: COMMERCIAL

## 2025-07-23 VITALS
SYSTOLIC BLOOD PRESSURE: 160 MMHG | HEIGHT: 64 IN | OXYGEN SATURATION: 98 % | RESPIRATION RATE: 16 BRPM | HEART RATE: 88 BPM | DIASTOLIC BLOOD PRESSURE: 75 MMHG | BODY MASS INDEX: 22.02 KG/M2 | WEIGHT: 129 LBS | TEMPERATURE: 97.3 F

## 2025-07-23 DIAGNOSIS — I10 PRIMARY HYPERTENSION: ICD-10-CM

## 2025-07-23 DIAGNOSIS — N17.9 AKI (ACUTE KIDNEY INJURY) (HCC): ICD-10-CM

## 2025-07-23 DIAGNOSIS — N12 PYELONEPHRITIS: Primary | ICD-10-CM

## 2025-07-23 PROCEDURE — 99495 TRANSJ CARE MGMT MOD F2F 14D: CPT | Performed by: FAMILY MEDICINE

## 2025-07-23 NOTE — ASSESSMENT & PLAN NOTE
At her recent hospitalization.  Discussed with patient.  Patient advised to hydrate well.  Check her labs now.  Orders:  •  UA w Reflex to Microscopic w Reflex to Culture; Future  •  Basic metabolic panel; Future

## 2025-07-23 NOTE — ASSESSMENT & PLAN NOTE
Status post hospitalization from 7 5 till 7/9/2025.  Treated with IV Zosyn at the hospital and discharged on p.o. antibiotics for 9 more days.  Patient is doing better now.  Patient also had HERMINIA.  Discussed with patient.  Patient education.  Patient advised well hydration.  Patient advised to recheck her urine now including a CBC and a BMP.  I will see her again in 1 to 2 weeks.  Orders:  •  UA w Reflex to Microscopic w Reflex to Culture; Future  •  CBC and differential; Future

## 2025-07-23 NOTE — PROGRESS NOTES
Transition of Care Visit:  Name: Latrice Palacio      : 1945      MRN: [id number]  Encounter Provider: Subhash Leonard MD  Encounter Date: 2025   Encounter department: [facility]    Assessment & Plan  Pyelonephritis  Status post hospitalization from  till 2025.  Treated with IV Zosyn at the hospital and discharged on p.o. antibiotics for 9 more days.  Patient is doing better now.  Patient also had HERMINIA.  Discussed with patient.  Patient education.  Patient advised well hydration.  Patient advised to recheck her urine now including a CBC and a BMP.  I will see her again in 1 to 2 weeks.  Orders:  •  UA w Reflex to Microscopic w Reflex to Culture; Future  •  CBC and differential; Future    HERMINIA (acute kidney injury) (HCC)  At her recent hospitalization.  Discussed with patient.  Patient advised to hydrate well.  Check her labs now.  Orders:  •  UA w Reflex to Microscopic w Reflex to Culture; Future  •  Basic metabolic panel; Future    Primary hypertension  Elevated secondary to her anxiety.  Patient denies any acute cardiovascular or neurosymptoms from it.  Discussed with patient per patient education.  Patient advised low-sodium diet.  Hydrate well.  Will see patient again in 1 to 2 weeks and we will check it at that time as well.            RTO 1 to 2 weeks and do the labs tomorrow.  Also do the labs that I ordered for her back in March to be done 3 months after that which he has not had a chance to do them yet.  Patient understood and will go for labs tomorrow.          History of Present Illness     Transitional Care Management Review:   Latrice Palacio is a 80 y.o. female here for TCM follow up.     During the TCM phone call patient stated:  TCM Call (since 2025)     Date and time call was made  7/10/2025 11:52 AM    Hospital care reviewed  Records reviewed    Patient was hospitialized at  [facility]    Date of Admission  25    Date of discharge   "07/09/25    Diagnosis  Sepsis (HCC)    Disposition  Home    Were the patients medications reviewed and updated  Yes    Current Symptoms  None      TCM Call (since 7/9/2025)     Post hospital issues  None    Scheduled for follow up?  Yes    Did you obtain your prescribed medications  Yes    Do you need help managing your prescriptions or medications  No    Is transportation to your appointment needed  No    I have advised the patient to call PCP with any new or worsening symptoms  Camilla Nolasco MA    Living Arrangements  Alone    Support System  None    Are you recieving home care services  No        80-year-old female here for follow-up visit on her recent hospitalization from 7/5/2025 to 7/9/2025 for right pyelonephritis.  Patient was treated with IV Zosyn and then DC on p.o. antibiotics for 9 days more.  Patient feels better now.  He denies any UTI symptoms.  Patient is nervous hence why her blood pressure is elevated the patient denies any acute cardiovascular or neurosymptoms from it.  Patient has not checked her urine since then.  Patient was also found to be in HERMINIA.  Was given fluids.      Review of Systems   Constitutional:  Negative for chills, fatigue and fever.   Eyes:  Negative for visual disturbance.   Respiratory:  Negative for cough and shortness of breath.    Cardiovascular:  Negative for chest pain and palpitations.   Gastrointestinal:  Negative for abdominal pain and blood in stool.   Genitourinary:  Negative for dysuria, flank pain and hematuria.   Neurological:  Negative for dizziness and headaches.   Psychiatric/Behavioral:  Negative for dysphoric mood. The patient is nervous/anxious.      Objective   /75 (BP Location: Left arm, Patient Position: Sitting, Cuff Size: Adult)   Pulse 88   Temp (!) 97.3 °F (36.3 °C) (Temporal)   Resp 16   Ht 5' 4\" (1.626 m)   Wt 58.5 kg (129 lb)   LMP  (LMP Unknown)   SpO2 98%   BMI 22.14 kg/m²     Physical Exam  Vitals reviewed.   Constitutional:  "      General: She is not in acute distress.     Appearance: Normal appearance. She is not ill-appearing.   HENT:      Mouth/Throat:      Mouth: Mucous membranes are moist.   Neck:      Vascular: No carotid bruit.     Cardiovascular:      Rate and Rhythm: Normal rate and regular rhythm.   Pulmonary:      Effort: Pulmonary effort is normal.      Breath sounds: Normal breath sounds.   Abdominal:      Palpations: Abdomen is soft.      Tenderness: There is no abdominal tenderness. There is no right CVA tenderness or left CVA tenderness.     Musculoskeletal:      Right lower leg: No edema.      Left lower leg: No edema.      Comments: No calf tenderness bilateral.     Skin:     General: Skin is warm.     Neurological:      General: No focal deficit present.      Mental Status: She is alert and oriented to person, place, and time.     Psychiatric:         Mood and Affect: Mood normal.         Behavior: Behavior normal.         Thought Content: Thought content normal.       Medications have been reviewed by provider in current encounter

## 2025-07-23 NOTE — ASSESSMENT & PLAN NOTE
Elevated secondary to her anxiety.  Patient denies any acute cardiovascular or neurosymptoms from it.  Discussed with patient per patient education.  Patient advised low-sodium diet.  Hydrate well.  Will see patient again in 1 to 2 weeks and we will check it at that time as well.

## 2025-07-30 ENCOUNTER — APPOINTMENT (OUTPATIENT)
Dept: LAB | Facility: CLINIC | Age: 80
End: 2025-07-30
Attending: FAMILY MEDICINE
Payer: COMMERCIAL

## 2025-07-30 DIAGNOSIS — N12 PYELONEPHRITIS: ICD-10-CM

## 2025-07-30 DIAGNOSIS — I10 PRIMARY HYPERTENSION: ICD-10-CM

## 2025-07-30 DIAGNOSIS — N17.9 AKI (ACUTE KIDNEY INJURY) (HCC): ICD-10-CM

## 2025-07-30 DIAGNOSIS — R73.03 PREDIABETES: ICD-10-CM

## 2025-07-30 DIAGNOSIS — R06.09 DYSPNEA ON EXERTION: ICD-10-CM

## 2025-07-30 DIAGNOSIS — E78.2 MIXED HYPERLIPIDEMIA: ICD-10-CM

## 2025-07-30 DIAGNOSIS — N18.2 CKD (CHRONIC KIDNEY DISEASE) STAGE 2, GFR 60-89 ML/MIN: ICD-10-CM

## 2025-07-30 LAB
ALBUMIN SERPL BCG-MCNC: 4.3 G/DL (ref 3.5–5)
ALP SERPL-CCNC: 90 U/L (ref 34–104)
ALT SERPL W P-5'-P-CCNC: 21 U/L (ref 7–52)
ANION GAP SERPL CALCULATED.3IONS-SCNC: 10 MMOL/L (ref 4–13)
AST SERPL W P-5'-P-CCNC: 23 U/L (ref 13–39)
BACTERIA UR QL AUTO: ABNORMAL /HPF
BILIRUB SERPL-MCNC: 0.26 MG/DL (ref 0.2–1)
BILIRUB UR QL STRIP: NEGATIVE
BNP SERPL-MCNC: 53 PG/ML (ref 0–100)
BUN SERPL-MCNC: 15 MG/DL (ref 5–25)
CALCIUM SERPL-MCNC: 9.6 MG/DL (ref 8.4–10.2)
CHLORIDE SERPL-SCNC: 103 MMOL/L (ref 96–108)
CHOLEST SERPL-MCNC: 156 MG/DL (ref ?–200)
CLARITY UR: CLEAR
CO2 SERPL-SCNC: 26 MMOL/L (ref 21–32)
COLOR UR: ABNORMAL
CREAT SERPL-MCNC: 0.8 MG/DL (ref 0.6–1.3)
EST. AVERAGE GLUCOSE BLD GHB EST-MCNC: 140 MG/DL
GFR SERPL CREATININE-BSD FRML MDRD: 70 ML/MIN/1.73SQ M
GLUCOSE P FAST SERPL-MCNC: 116 MG/DL (ref 65–99)
GLUCOSE UR STRIP-MCNC: NEGATIVE MG/DL
HBA1C MFR BLD: 6.5 %
HDLC SERPL-MCNC: 47 MG/DL
HGB UR QL STRIP.AUTO: NEGATIVE
KETONES UR STRIP-MCNC: NEGATIVE MG/DL
LDLC SERPL CALC-MCNC: 45 MG/DL (ref 0–100)
LEUKOCYTE ESTERASE UR QL STRIP: ABNORMAL
NITRITE UR QL STRIP: NEGATIVE
NON-SQ EPI CELLS URNS QL MICRO: ABNORMAL /HPF
NONHDLC SERPL-MCNC: 109 MG/DL
PH UR STRIP.AUTO: 6.5 [PH]
POTASSIUM SERPL-SCNC: 4.6 MMOL/L (ref 3.5–5.3)
PROT SERPL-MCNC: 7.9 G/DL (ref 6.4–8.4)
PROT UR STRIP-MCNC: NEGATIVE MG/DL
RBC #/AREA URNS AUTO: ABNORMAL /HPF
SODIUM SERPL-SCNC: 139 MMOL/L (ref 135–147)
SP GR UR STRIP.AUTO: 1.02 (ref 1–1.03)
TRIGL SERPL-MCNC: 318 MG/DL (ref ?–150)
UROBILINOGEN UR STRIP-ACNC: <2 MG/DL
WBC #/AREA URNS AUTO: ABNORMAL /HPF

## 2025-07-30 PROCEDURE — 81001 URINALYSIS AUTO W/SCOPE: CPT

## 2025-07-30 PROCEDURE — 82570 ASSAY OF URINE CREATININE: CPT

## 2025-07-30 PROCEDURE — 36415 COLL VENOUS BLD VENIPUNCTURE: CPT

## 2025-07-30 PROCEDURE — 83880 ASSAY OF NATRIURETIC PEPTIDE: CPT

## 2025-07-30 PROCEDURE — 83036 HEMOGLOBIN GLYCOSYLATED A1C: CPT

## 2025-07-30 PROCEDURE — 87086 URINE CULTURE/COLONY COUNT: CPT

## 2025-07-30 PROCEDURE — 82043 UR ALBUMIN QUANTITATIVE: CPT

## 2025-07-30 PROCEDURE — 80053 COMPREHEN METABOLIC PANEL: CPT

## 2025-07-30 PROCEDURE — 80061 LIPID PANEL: CPT

## 2025-07-31 LAB
BACTERIA UR CULT: NORMAL
CREAT UR-MCNC: 78.2 MG/DL
MICROALBUMIN UR-MCNC: 20.4 MG/L
MICROALBUMIN/CREAT 24H UR: 26 MG/G CREATININE (ref 0–30)

## 2025-08-01 ENCOUNTER — APPOINTMENT (OUTPATIENT)
Dept: LAB | Facility: CLINIC | Age: 80
End: 2025-08-01
Payer: COMMERCIAL

## 2025-08-01 DIAGNOSIS — N12 PYELONEPHRITIS: ICD-10-CM

## 2025-08-01 LAB
BASOPHILS # BLD AUTO: 0.05 THOUSANDS/ÂΜL (ref 0–0.1)
BASOPHILS NFR BLD AUTO: 1 % (ref 0–1)
EOSINOPHIL # BLD AUTO: 0.22 THOUSAND/ÂΜL (ref 0–0.61)
EOSINOPHIL NFR BLD AUTO: 3 % (ref 0–6)
ERYTHROCYTE [DISTWIDTH] IN BLOOD BY AUTOMATED COUNT: 14 % (ref 11.6–15.1)
HCT VFR BLD AUTO: 39.8 % (ref 34.8–46.1)
HGB BLD-MCNC: 12.4 G/DL (ref 11.5–15.4)
IMM GRANULOCYTES # BLD AUTO: 0.02 THOUSAND/UL (ref 0–0.2)
IMM GRANULOCYTES NFR BLD AUTO: 0 % (ref 0–2)
LYMPHOCYTES # BLD AUTO: 4 THOUSANDS/ÂΜL (ref 0.6–4.47)
LYMPHOCYTES NFR BLD AUTO: 46 % (ref 14–44)
MCH RBC QN AUTO: 29.9 PG (ref 26.8–34.3)
MCHC RBC AUTO-ENTMCNC: 31.2 G/DL (ref 31.4–37.4)
MCV RBC AUTO: 96 FL (ref 82–98)
MONOCYTES # BLD AUTO: 0.6 THOUSAND/ÂΜL (ref 0.17–1.22)
MONOCYTES NFR BLD AUTO: 7 % (ref 4–12)
NEUTROPHILS # BLD AUTO: 3.69 THOUSANDS/ÂΜL (ref 1.85–7.62)
NEUTS SEG NFR BLD AUTO: 43 % (ref 43–75)
NRBC BLD AUTO-RTO: 0 /100 WBCS
PLATELET # BLD AUTO: 324 THOUSANDS/UL (ref 149–390)
PMV BLD AUTO: 10.7 FL (ref 8.9–12.7)
RBC # BLD AUTO: 4.15 MILLION/UL (ref 3.81–5.12)
WBC # BLD AUTO: 8.58 THOUSAND/UL (ref 4.31–10.16)

## 2025-08-01 PROCEDURE — 85025 COMPLETE CBC W/AUTO DIFF WBC: CPT

## 2025-08-01 PROCEDURE — 36415 COLL VENOUS BLD VENIPUNCTURE: CPT

## 2025-08-05 PROBLEM — N12 PYELONEPHRITIS: Status: RESOLVED | Noted: 2025-07-06 | Resolved: 2025-08-05

## 2025-08-05 PROBLEM — A41.9 SEPSIS (HCC): Status: RESOLVED | Noted: 2025-07-06 | Resolved: 2025-08-05

## 2025-08-06 ENCOUNTER — OFFICE VISIT (OUTPATIENT)
Dept: FAMILY MEDICINE CLINIC | Facility: CLINIC | Age: 80
End: 2025-08-06
Payer: COMMERCIAL

## 2025-08-06 VITALS
DIASTOLIC BLOOD PRESSURE: 60 MMHG | RESPIRATION RATE: 16 BRPM | HEART RATE: 92 BPM | OXYGEN SATURATION: 97 % | BODY MASS INDEX: 22.13 KG/M2 | TEMPERATURE: 97.6 F | HEIGHT: 64 IN | WEIGHT: 129.6 LBS | SYSTOLIC BLOOD PRESSURE: 129 MMHG

## 2025-08-06 DIAGNOSIS — N12 PYELONEPHRITIS OF RIGHT KIDNEY: ICD-10-CM

## 2025-08-06 DIAGNOSIS — I10 PRIMARY HYPERTENSION: ICD-10-CM

## 2025-08-06 DIAGNOSIS — E78.2 MIXED HYPERLIPIDEMIA: ICD-10-CM

## 2025-08-06 DIAGNOSIS — R73.03 PREDIABETES: Primary | ICD-10-CM

## 2025-08-06 DIAGNOSIS — N18.2 CKD (CHRONIC KIDNEY DISEASE) STAGE 2, GFR 60-89 ML/MIN: ICD-10-CM

## 2025-08-06 PROBLEM — N17.9 AKI (ACUTE KIDNEY INJURY) (HCC): Status: RESOLVED | Noted: 2025-07-06 | Resolved: 2025-08-06

## 2025-08-06 PROCEDURE — 99214 OFFICE O/P EST MOD 30 MIN: CPT | Performed by: FAMILY MEDICINE

## 2025-08-06 RX ORDER — METOPROLOL TARTRATE 25 MG/1
12.5 TABLET, FILM COATED ORAL EVERY 12 HOURS SCHEDULED
Qty: 90 TABLET | Refills: 1 | Status: SHIPPED | OUTPATIENT
Start: 2025-08-06